# Patient Record
Sex: MALE | Race: WHITE | NOT HISPANIC OR LATINO | Employment: OTHER | URBAN - METROPOLITAN AREA
[De-identification: names, ages, dates, MRNs, and addresses within clinical notes are randomized per-mention and may not be internally consistent; named-entity substitution may affect disease eponyms.]

---

## 2017-01-27 DIAGNOSIS — E11.9 TYPE 2 DIABETES MELLITUS WITHOUT COMPLICATION: ICD-10-CM

## 2017-02-13 ENCOUNTER — HISTORICAL (OUTPATIENT)
Dept: ADMINISTRATIVE | Facility: HOSPITAL | Age: 67
End: 2017-02-13

## 2017-02-13 LAB
ALBUMIN SERPL-MCNC: 4.2 G/DL (ref 3.1–4.7)
ALP SERPL-CCNC: 52 IU/L (ref 40–104)
ALT (SGPT): 20 IU/L (ref 3–33)
AST SERPL-CCNC: 14 IU/L (ref 10–40)
BASOPHILS NFR BLD: 0 K/UL (ref 0–0.2)
BASOPHILS NFR BLD: 0.3 %
BILIRUB SERPL-MCNC: 0.9 MG/DL (ref 0.3–1)
BUN SERPL-MCNC: 11 MG/DL (ref 8–20)
CALCIUM SERPL-MCNC: 9.4 MG/DL (ref 7.7–10.4)
CHLORIDE: 100 MMOL/L (ref 98–110)
CO2 SERPL-SCNC: 30.4 MMOL/L (ref 22.8–31.6)
CREATININE: 0.94 MG/DL (ref 0.6–1.4)
CRP SERPL-MCNC: 0.1 MG/DL (ref 0–1.4)
EOSINOPHIL NFR BLD: 0.1 K/UL (ref 0–0.7)
EOSINOPHIL NFR BLD: 1 %
ERYTHROCYTE [DISTWIDTH] IN BLOOD BY AUTOMATED COUNT: 12.7 % (ref 11.7–14.9)
GLUCOSE: 277 MG/DL (ref 70–99)
GRAN #: 7.9 K/UL (ref 1.4–6.5)
GRAN%: 74.7 %
HCT VFR BLD AUTO: 41.8 % (ref 39–55)
HGB BLD-MCNC: 13.8 G/DL (ref 14–16)
IMMATURE GRANS (ABS): 0.1 K/UL (ref 0–1)
IMMATURE GRANULOCYTES: 0.8 %
LYMPH #: 1.8 K/UL (ref 1.2–3.4)
LYMPH%: 16.6 %
MCH RBC QN AUTO: 29.1 PG (ref 25–35)
MCHC RBC AUTO-ENTMCNC: 33 G/DL (ref 31–36)
MCV RBC AUTO: 88.2 FL (ref 80–100)
MONO #: 0.7 K/UL (ref 0.1–0.6)
MONO%: 6.6 %
NUCLEATED RBCS: 0 %
PLATELET # BLD AUTO: 247 K/UL (ref 140–440)
PMV BLD AUTO: 9.9 FL (ref 8.8–12.7)
POTASSIUM SERPL-SCNC: 4.1 MMOL/L (ref 3.5–5)
PROT SERPL-MCNC: 7.5 G/DL (ref 6–8.2)
RBC # BLD AUTO: 4.74 M/UL (ref 4.3–5.9)
SODIUM: 140 MMOL/L (ref 134–144)
WBC # BLD AUTO: 10.6 K/UL (ref 5–10)

## 2017-03-15 ENCOUNTER — HISTORICAL (OUTPATIENT)
Dept: ADMINISTRATIVE | Facility: HOSPITAL | Age: 67
End: 2017-03-15

## 2017-05-23 RX ORDER — QUININE SULFATE 324 MG/1
CAPSULE ORAL
Qty: 30 CAPSULE | Refills: 11 | Status: SHIPPED | OUTPATIENT
Start: 2017-05-23 | End: 2018-12-18

## 2017-05-30 ENCOUNTER — OFFICE VISIT (OUTPATIENT)
Dept: RHEUMATOLOGY | Facility: CLINIC | Age: 67
End: 2017-05-30
Payer: MEDICARE

## 2017-05-30 VITALS
DIASTOLIC BLOOD PRESSURE: 80 MMHG | WEIGHT: 178.31 LBS | SYSTOLIC BLOOD PRESSURE: 136 MMHG | HEIGHT: 67 IN | BODY MASS INDEX: 27.99 KG/M2

## 2017-05-30 DIAGNOSIS — M06.9 RHEUMATOID ARTHRITIS, INVOLVING UNSPECIFIED SITE, UNSPECIFIED RHEUMATOID FACTOR PRESENCE: ICD-10-CM

## 2017-05-30 DIAGNOSIS — M25.40 JOINT SWELLING: ICD-10-CM

## 2017-05-30 DIAGNOSIS — M65.9 SYNOVITIS: ICD-10-CM

## 2017-05-30 DIAGNOSIS — Z79.631 METHOTREXATE, LONG TERM, CURRENT USE: Primary | ICD-10-CM

## 2017-05-30 PROCEDURE — 99213 OFFICE O/P EST LOW 20 MIN: CPT | Mod: ,,, | Performed by: INTERNAL MEDICINE

## 2017-05-30 PROCEDURE — 1159F MED LIST DOCD IN RCRD: CPT | Mod: ,,, | Performed by: INTERNAL MEDICINE

## 2017-05-30 RX ORDER — PREDNISONE 5 MG/1
5 TABLET ORAL DAILY
COMMUNITY
End: 2017-09-26

## 2017-05-30 NOTE — PROGRESS NOTES
The patient was seen during a power outage. Unfortunately, no access to the computer or to dictation was available. The patient has been treated for polymyalgia rheumatic.As I was tapering his steroids at appropriate intervals, swelling and pain of the MCP joints of both hands became apparent I saw the patient for this problem one month ago and he was given a steroid challenge. That was quite helpful and he reports that during the first week of that challenge, he had a 100% benefit.He is now off of the additional steroids but continues on 5 mg daily. The physical exam continues to demonstrate synovial proliferation of the right second and third MCP joint only. Unfortunately, the blood tests obtained at the last office visit were also not available to me. Clinically however, this presentation is consistent with rheumatoid arthritis that was masked by steroids used for polymyalgia rheumatica which has now essentially been fully treated. I discussed the options with the patient and informed him that I would like to approach the rheumatoid arthritis with the mildest and safest treatment available, hydroxychloroquine. The medication was discussed at length with him and he is in full agreement. I have therefore given him a handwritten prescription for hydroxychloroquine 200 mg to be taken twice daily. He will continue to take prednisone 5 mg once daily. No blood testing is required today but I will see him back in 2 months and then repeat all appropriate testing. The patient was referred to ophthalmology for a baseline retinal exam and visual fields.

## 2017-07-31 ENCOUNTER — OFFICE VISIT (OUTPATIENT)
Dept: RHEUMATOLOGY | Facility: CLINIC | Age: 67
End: 2017-07-31
Payer: MEDICARE

## 2017-07-31 VITALS
HEIGHT: 67 IN | DIASTOLIC BLOOD PRESSURE: 81 MMHG | WEIGHT: 179 LBS | SYSTOLIC BLOOD PRESSURE: 120 MMHG | BODY MASS INDEX: 28.09 KG/M2

## 2017-07-31 DIAGNOSIS — E11.9 TYPE 2 DIABETES MELLITUS WITHOUT RETINOPATHY: ICD-10-CM

## 2017-07-31 DIAGNOSIS — M19.90 CHRONIC INFLAMMATORY ARTHRITIS: Primary | ICD-10-CM

## 2017-07-31 DIAGNOSIS — Z79.899 ENCOUNTER FOR LONG-TERM (CURRENT) USE OF OTHER MEDICATIONS: ICD-10-CM

## 2017-07-31 DIAGNOSIS — M35.3 PMR (POLYMYALGIA RHEUMATICA): Primary | ICD-10-CM

## 2017-07-31 LAB
ALBUMIN SERPL-MCNC: 4.3 G/DL (ref 3.1–4.7)
ALP SERPL-CCNC: 55 IU/L (ref 40–104)
ALT (SGPT): 22 IU/L (ref 3–33)
AST SERPL-CCNC: 16 IU/L (ref 10–40)
BASOPHILS NFR BLD: 0 K/UL (ref 0–0.2)
BASOPHILS NFR BLD: 0.4 %
BILIRUB SERPL-MCNC: 0.8 MG/DL (ref 0.3–1)
BUN SERPL-MCNC: 15 MG/DL (ref 8–20)
CALCIUM SERPL-MCNC: 9.8 MG/DL (ref 7.7–10.4)
CHLORIDE: 101 MMOL/L (ref 98–110)
CO2 SERPL-SCNC: 28.8 MMOL/L (ref 22.8–31.6)
CREATININE: 0.89 MG/DL (ref 0.6–1.4)
CRP SERPL-MCNC: 0.19 MG/DL (ref 0–1.4)
EOSINOPHIL NFR BLD: 0.1 K/UL (ref 0–0.7)
EOSINOPHIL NFR BLD: 1.8 %
ERYTHROCYTE [DISTWIDTH] IN BLOOD BY AUTOMATED COUNT: 12.5 % (ref 11.7–14.9)
GLUCOSE: 155 MG/DL (ref 70–99)
GRAN #: 4.7 K/UL (ref 1.4–6.5)
GRAN%: 60.1 %
HCT VFR BLD AUTO: 44.1 % (ref 39–55)
HGB BLD-MCNC: 14.5 G/DL (ref 14–16)
IMMATURE GRANS (ABS): 0 K/UL (ref 0–1)
IMMATURE GRANULOCYTES: 0.4 %
LYMPH #: 2.3 K/UL (ref 1.2–3.4)
LYMPH%: 29.4 %
MCH RBC QN AUTO: 28.2 PG (ref 25–35)
MCHC RBC AUTO-ENTMCNC: 32.9 G/DL (ref 31–36)
MCV RBC AUTO: 85.8 FL (ref 80–100)
MONO #: 0.6 K/UL (ref 0.1–0.6)
MONO%: 7.9 %
NUCLEATED RBCS: 0 %
PLATELET # BLD AUTO: 280 K/UL (ref 140–440)
PMV BLD AUTO: 9.7 FL (ref 8.8–12.7)
POTASSIUM SERPL-SCNC: 4.3 MMOL/L (ref 3.5–5)
PROT SERPL-MCNC: 7.6 G/DL (ref 6–8.2)
RBC # BLD AUTO: 5.14 M/UL (ref 4.3–5.9)
SODIUM: 139 MMOL/L (ref 134–144)
WBC # BLD AUTO: 7.8 K/UL (ref 5–10)

## 2017-07-31 PROCEDURE — 99214 OFFICE O/P EST MOD 30 MIN: CPT | Mod: ,,, | Performed by: INTERNAL MEDICINE

## 2017-07-31 PROCEDURE — 1159F MED LIST DOCD IN RCRD: CPT | Mod: ,,, | Performed by: INTERNAL MEDICINE

## 2017-07-31 RX ORDER — PREDNISONE 5 MG/1
5 TABLET ORAL DAILY
Qty: 30 TABLET | Refills: 5 | OUTPATIENT
Start: 2017-07-31

## 2017-07-31 RX ORDER — GLIPIZIDE AND METFORMIN HCL 5; 500 MG/1; MG/1
2 TABLET, FILM COATED ORAL DAILY
Qty: 180 TABLET | Refills: 3 | OUTPATIENT
Start: 2017-07-31

## 2017-07-31 NOTE — PROGRESS NOTES
[facility] RHEUMATOLOGY           Follow-up visit      Subjective:       Patient ID:   NAME: Kyrie Norton Jr. : 1950     67 y.o. male    Referring Doc: No ref. provider found  Other Physicians:    Chief Complaint:  polymyagia rheumatica (follow up, cramps still in hands and fingers )      HPI/Interval History:     The patient is doing very well. He has some problems with cramping in his hands mostly in the morning. He does not note any discrete joint swelling, redness or warmth. He takes prednisone 5 mg once daily in the morning and does not note that it has any benefit and also feels that he is just as well in the early evening as he was an hour after taking the prednisone. His original diagnosis with me was polymyalgia rheumatica but at one point, several months back, he did appear to have some inflammatory changes. At that time I considered that his diagnosis was perhaps inflammatory arthritis rather than simply polymyalgia rheumatica.          ROS:   GEN: no fever, night sweats or weight loss  SKIN:  no rashes , erythema, bruising, or swelling, no Raynauds, no photosensitivity  HEENT: no HAs, no changes in vision, no mouth ulcers, no sicca symptoms, no scalp tenderness, jaw claudication.  CV: no CP, SOB, PND, JUAN or orthopnea,no palpitations  PULM: no SOB, cough, hemoptysis, sputum or pleuritic pain  GI: no abdominal pain, nausea, vomiting, constipation, diarrhea, melanotic stools, BRBPR, or hematemesis.no dysphagia  : no hematuria, dysuria  NEURO:no paresthesias, headaches, visual disturbances, muscle weakness  MUSCULOSKELETAL:  Complaints of spasms in the hands and perhaps morning stiffness but no complaints of red, hot and/or swollen joints.  PSYCH: No insomnia, no significant depression, no anxiety    Medications:    Current Outpatient Prescriptions:     atorvastatin (LIPITOR) 40 MG tablet, Take 1 tablet (40 mg total) by mouth once daily., Disp: 90 tablet, Rfl: 4    blood sugar diagnostic  "(ACCU-CHEK CJ PLUS TEST STRP) Strp, 1 strip by Subdermal route Daily., Disp: 100 strip, Rfl: 4    ciclopirox (PENLAC) 8 % Soln, Apply topically once daily., Disp: 1 Bottle, Rfl: 10    CINNAMON OIL MISC, by Misc.(Non-Drug; Combo Route) route., Disp: , Rfl:     co-enzyme Q-10 50 mg capsule, Take 50 mg by mouth once daily., Disp: , Rfl:     fish oil-omega-3 fatty acids 300-1,000 mg capsule, Take 2 g by mouth once daily., Disp: , Rfl:     glipizide-metformin (METAGLIP) 5-500 mg per tablet, Take 2 tablets by mouth once daily., Disp: 180 tablet, Rfl: 3    ibuprofen (ADVIL,MOTRIN) 800 MG tablet, Take 1 tablet (800 mg total) by mouth every 6 (six) hours as needed for Pain., Disp: 20 tablet, Rfl: 0    lancets (ACCU-CHEK FASTCLIX) Misc, 1 Device by Misc.(Non-Drug; Combo Route) route Daily., Disp: 100 each, Rfl: 4    milk thistle 175 mg tablet, Take 175 mg by mouth once daily., Disp: , Rfl:     predniSONE (DELTASONE) 5 MG tablet, Take 5 mg by mouth once daily., Disp: , Rfl:     quinine sulfate 324 mg Cap, TAKE ONE CAPSULE BY MOUTH EVERY EVENING, Disp: 30 capsule, Rfl: 11    S-ADENOSYLMETHIONINE SUL TOSYL (NIDIA-E ORAL), Take by mouth., Disp: , Rfl:     sildenafil (VIAGRA) 100 MG tablet, Take 1 tablet (100 mg total) by mouth daily as needed for Erectile Dysfunction., Disp: 4 tablet, Rfl: 4    tadalafil (CIALIS) 5 MG tablet, Take 1 tablet (5 mg total) by mouth daily as needed for Erectile Dysfunction., Disp: 30 tablet, Rfl: 6  FAMILY HISTORY: negative for Connective Tissue Disease        Review of patient's allergies indicates:   Allergen Reactions    Parafon forte Nausea Only             Objective:     Vitals:  Blood pressure 120/81, height 5' 7" (1.702 m), weight 81.2 kg (179 lb).    Physical Examination:   GEN: wn/wd male in no apparent distress; AAOx3  SKIN: no rashes, no lesions, no sclerodactyly, no Raynaud's, no periungual erythema  HEAD: no alopecia, no scalp tenderness, no temporal artery tenderness or " induration.  EYES: no pallor, no icterus, PERRLA  ENT:  no thrush, no mucosal dryness or ulcerations, adequate oral hygiene & dentition.  NECK: supple x 6, no masses, no thyromegaly, no lymphadenopathy.  CV:   S1 and S2 regular, no murmurs, gallop or rubs  CHEST: Normal respiratory effort;  normal breath sounds/no adventitious sounds. No signs of consolidation.  ABD: non-tender and non-distended; soft; normal bowel sounds; no rebound/guarding or tenderness. No hepatosplenomegaly.  Musculoskeletal:  There is no evidence of inflammatory arthritis today or of any progressive deformity.  EXTREM: no clubbing, cyanosis or edema. normal pulses.  NEURO: grossly intact; motor/sensory WNL; AAOx3; no tremors  PSYCH: normal mood, affect and behavior            Labs:   Lab Results   Component Value Date    WBC 10.6 (H) 02/13/2017    HGB 13.8 (L) 02/13/2017    HCT 41.8 02/13/2017    MCV 88.2 02/13/2017     02/13/2017   CMP@  Sodium   Date Value Ref Range Status   02/13/2017 140 134 - 144 mmol/L      Potassium   Date Value Ref Range Status   02/13/2017 4.1 3.5 - 5.0 mmol/L      Chloride   Date Value Ref Range Status   02/13/2017 100 98 - 110 mmol/L      CO2   Date Value Ref Range Status   02/13/2017 30.4 22.8 - 31.6 mmol/L      Glucose   Date Value Ref Range Status   02/13/2017 277 (H) 70 - 99 mg/dL      BUN, Bld   Date Value Ref Range Status   02/13/2017 11 8 - 20 mg/dL      Creatinine   Date Value Ref Range Status   02/13/2017 0.94 0.60 - 1.40 mg/dL      Calcium   Date Value Ref Range Status   02/13/2017 9.4 7.7 - 10.4 mg/dL      Total Protein   Date Value Ref Range Status   02/13/2017 7.5 6.0 - 8.2 g/dL      Albumin   Date Value Ref Range Status   02/13/2017 4.2 3.1 - 4.7 g/dL      Total Bilirubin   Date Value Ref Range Status   02/13/2017 0.9 0.3 - 1.0 mg/dL      Alkaline Phosphatase   Date Value Ref Range Status   02/13/2017 52 40 - 104 IU/L      AST   Date Value Ref Range Status   02/13/2017 14 10 - 40 IU/L      ALT    Date Value Ref Range Status   12/14/2015 21 10 - 44 U/L Final     CRP   Date Value Ref Range Status   02/13/2017 0.10 0.00 - 1.40 mg/dL          Radiology/Diagnostic Studies:        Assessment/Discussion/Plan:   67 y.o. male with polymyalgia rheumatica-feeling well on extremely low-dose prednisone.  (2) At least one episode of what appeared clinically to be inflammatory arthritis, not currently noted.      PLAN:  I am going to have him decrease his prednisone for the month of August to 1 dose every other day.On September 1, he is to discontinue prednisone completely.  I have asked the patient to notify me immediately of any changes that he notes that are significant during this 2 month period.   Routine blood testing was ordered today.    RTC:  I will see him back in 2 months.      Electronically signed by Calderon Barrett MD

## 2017-08-01 LAB — CCP ANTIBODIES IGG/IGA: 7 UNITS (ref 0–19)

## 2017-08-01 RX ORDER — HYDROXYCHLOROQUINE SULFATE 200 MG/1
TABLET, FILM COATED ORAL
Qty: 60 TABLET | Refills: 0 | Status: SHIPPED | OUTPATIENT
Start: 2017-08-01 | End: 2017-08-07 | Stop reason: SDUPTHER

## 2017-08-07 DIAGNOSIS — M19.90 CHRONIC INFLAMMATORY ARTHRITIS: Primary | ICD-10-CM

## 2017-08-07 RX ORDER — HYDROXYCHLOROQUINE SULFATE 200 MG/1
TABLET, FILM COATED ORAL
Qty: 60 TABLET | Refills: 5 | Status: SHIPPED | OUTPATIENT
Start: 2017-08-07 | End: 2018-03-28 | Stop reason: SDUPTHER

## 2017-09-06 DIAGNOSIS — M19.90 CHRONIC INFLAMMATORY ARTHRITIS: Primary | ICD-10-CM

## 2017-09-06 RX ORDER — HYDROXYCHLOROQUINE SULFATE 200 MG/1
TABLET, FILM COATED ORAL
Qty: 60 TABLET | Refills: 11 | Status: SHIPPED | OUTPATIENT
Start: 2017-09-06 | End: 2018-09-08 | Stop reason: SDUPTHER

## 2017-09-26 ENCOUNTER — OFFICE VISIT (OUTPATIENT)
Dept: RHEUMATOLOGY | Facility: CLINIC | Age: 67
End: 2017-09-26
Payer: MEDICARE

## 2017-09-26 VITALS
HEIGHT: 67 IN | SYSTOLIC BLOOD PRESSURE: 130 MMHG | WEIGHT: 185.63 LBS | BODY MASS INDEX: 29.13 KG/M2 | DIASTOLIC BLOOD PRESSURE: 74 MMHG

## 2017-09-26 DIAGNOSIS — Z79.899 ENCOUNTER FOR LONG-TERM (CURRENT) USE OF OTHER MEDICATIONS: Primary | ICD-10-CM

## 2017-09-26 DIAGNOSIS — M19.90 CHRONIC INFLAMMATORY ARTHRITIS: ICD-10-CM

## 2017-09-26 LAB
ALBUMIN SERPL-MCNC: 4.2 G/DL (ref 3.1–4.7)
ALP SERPL-CCNC: 52 IU/L (ref 40–104)
ALT (SGPT): 21 IU/L (ref 3–33)
AST SERPL-CCNC: 16 IU/L (ref 10–40)
BASOPHILS NFR BLD: 0 K/UL (ref 0–0.2)
BASOPHILS NFR BLD: 0.3 %
BILIRUB SERPL-MCNC: 0.8 MG/DL (ref 0.3–1)
BUN SERPL-MCNC: 14 MG/DL (ref 8–20)
CALCIUM SERPL-MCNC: 9.8 MG/DL (ref 7.7–10.4)
CHLORIDE: 103 MMOL/L (ref 98–110)
CO2 SERPL-SCNC: 27 MMOL/L (ref 22.8–31.6)
CREATININE: 0.86 MG/DL (ref 0.6–1.4)
CRP SERPL-MCNC: 0.62 MG/DL (ref 0–1.4)
EOSINOPHIL NFR BLD: 0.2 K/UL (ref 0–0.7)
EOSINOPHIL NFR BLD: 3.1 %
ERYTHROCYTE [DISTWIDTH] IN BLOOD BY AUTOMATED COUNT: 12.8 % (ref 11.7–14.9)
GLUCOSE: 78 MG/DL (ref 70–99)
GRAN #: 4.6 K/UL (ref 1.4–6.5)
GRAN%: 58.9 %
HCT VFR BLD AUTO: 39.3 % (ref 39–55)
HGB BLD-MCNC: 13.1 G/DL (ref 14–16)
IMMATURE GRANS (ABS): 0 K/UL (ref 0–1)
IMMATURE GRANULOCYTES: 0.5 %
LYMPH #: 2.2 K/UL (ref 1.2–3.4)
LYMPH%: 28.2 %
MCH RBC QN AUTO: 28.4 PG (ref 25–35)
MCHC RBC AUTO-ENTMCNC: 33.3 G/DL (ref 31–36)
MCV RBC AUTO: 85.1 FL (ref 80–100)
MONO #: 0.7 K/UL (ref 0.1–0.6)
MONO%: 9 %
NUCLEATED RBCS: 0 %
PLATELET # BLD AUTO: 286 K/UL (ref 140–440)
PMV BLD AUTO: 9.9 FL (ref 8.8–12.7)
POTASSIUM SERPL-SCNC: 4.7 MMOL/L (ref 3.5–5)
PROT SERPL-MCNC: 7.4 G/DL (ref 6–8.2)
RBC # BLD AUTO: 4.62 M/UL (ref 4.3–5.9)
SODIUM: 140 MMOL/L (ref 134–144)
WBC # BLD AUTO: 7.8 K/UL (ref 5–10)

## 2017-09-26 PROCEDURE — 99214 OFFICE O/P EST MOD 30 MIN: CPT | Mod: ,,, | Performed by: INTERNAL MEDICINE

## 2017-09-26 PROCEDURE — 1159F MED LIST DOCD IN RCRD: CPT | Mod: ,,, | Performed by: INTERNAL MEDICINE

## 2017-09-26 PROCEDURE — 3008F BODY MASS INDEX DOCD: CPT | Mod: ,,, | Performed by: INTERNAL MEDICINE

## 2017-09-26 PROCEDURE — 3075F SYST BP GE 130 - 139MM HG: CPT | Mod: ,,, | Performed by: INTERNAL MEDICINE

## 2017-09-26 PROCEDURE — 3078F DIAST BP <80 MM HG: CPT | Mod: ,,, | Performed by: INTERNAL MEDICINE

## 2017-09-26 RX ORDER — PREDNISONE 2.5 MG/1
2.5 TABLET ORAL 2 TIMES DAILY
Qty: 60 TABLET | Refills: 5 | Status: SHIPPED | OUTPATIENT
Start: 2017-09-26 | End: 2018-05-21 | Stop reason: SDUPTHER

## 2017-09-26 NOTE — PROGRESS NOTES
[facility] RHEUMATOLOGY           Follow-up visit      Subjective:       Patient ID:   NAME: Kyrie Norton Jr. : 1950     67 y.o. male    Referring Doc: No ref. provider found  Other Physicians:    Chief Complaint:  Arthritis (joint pain migrating all over - aleve and tylenol no help)      HPI/Interval History:     The patient has noted increasing pain and joint swelling almost immediately after his prednisone was discontinued. He has had swelling and stiffness in the small joints of the hands and pain and swelling in the left shoulder. He has both morning stiffness and significant gelling. Showering tends to be helpful in establishing.           ROS:   GEN: no fever, night sweats or weight loss  SKIN:  no rashes , erythema, bruising, or swelling, no Raynauds, no photosensitivity  HEENT: no HAs, no changes in vision, no mouth ulcers, no sicca symptoms, no scalp tenderness, jaw claudication.  CV: no CP, SOB, PND, JUAN or orthopnea,no palpitations  PULM: no SOB, cough, hemoptysis, sputum or pleuritic pain  GI: no abdominal pain, nausea, vomiting, constipation, diarrhea, melanotic stools, BRBPR, or hematemesis.no dysphagia  : no hematuria, dysuria  NEURO:no paresthesias, headaches, visual disturbances, muscle weakness  MUSCULOSKELETAL:  Joint pain and swelling as noted above  PSYCH: No insomnia, no significant depression, no anxiety    Medications:    Current Outpatient Prescriptions:     atorvastatin (LIPITOR) 40 MG tablet, Take 1 tablet (40 mg total) by mouth once daily., Disp: 90 tablet, Rfl: 4    blood sugar diagnostic (ACCU-CHEK CJ PLUS TEST STRP) Strp, 1 strip by Subdermal route Daily., Disp: 100 strip, Rfl: 4    ciclopirox (PENLAC) 8 % Soln, Apply topically once daily., Disp: 1 Bottle, Rfl: 10    CINNAMON OIL MISC, by Misc.(Non-Drug; Combo Route) route., Disp: , Rfl:     co-enzyme Q-10 50 mg capsule, Take 50 mg by mouth once daily., Disp: , Rfl:     fish oil-omega-3 fatty acids 300-1,000 mg  "capsule, Take 2 g by mouth once daily., Disp: , Rfl:     glipizide-metformin (METAGLIP) 5-500 mg per tablet, Take 2 tablets by mouth once daily., Disp: 180 tablet, Rfl: 3    hydroxychloroquine (PLAQUENIL) 200 mg tablet, TAKE 1 TABLET BY MOUTH EVERY 12 HOURS, Disp: 60 tablet, Rfl: 11    ibuprofen (ADVIL,MOTRIN) 800 MG tablet, Take 1 tablet (800 mg total) by mouth every 6 (six) hours as needed for Pain., Disp: 20 tablet, Rfl: 0    lancets (ACCU-CHEK FASTCLIX) Misc, 1 Device by Misc.(Non-Drug; Combo Route) route Daily., Disp: 100 each, Rfl: 4    milk thistle 175 mg tablet, Take 175 mg by mouth once daily., Disp: , Rfl:     quinine sulfate 324 mg Cap, TAKE ONE CAPSULE BY MOUTH EVERY EVENING, Disp: 30 capsule, Rfl: 11    S-ADENOSYLMETHIONINE SUL TOSYL (NIDIA-E ORAL), Take by mouth., Disp: , Rfl:     predniSONE (DELTASONE) 2.5 MG tablet, Take 1 tablet (2.5 mg total) by mouth 2 (two) times daily., Disp: 60 tablet, Rfl: 5    sildenafil (VIAGRA) 100 MG tablet, Take 1 tablet (100 mg total) by mouth daily as needed for Erectile Dysfunction., Disp: 4 tablet, Rfl: 4    tadalafil (CIALIS) 5 MG tablet, Take 1 tablet (5 mg total) by mouth daily as needed for Erectile Dysfunction., Disp: 30 tablet, Rfl: 6  FAMILY HISTORY: negative for Connective Tissue Disease        Review of patient's allergies indicates:   Allergen Reactions    Parafon forte Nausea Only             Objective:     Vitals:  Blood pressure 130/74, height 5' 7" (1.702 m), weight 84.2 kg (185 lb 9.6 oz).    Physical Examination:   GEN: wn/wd male in no apparent distress; AAOx3  SKIN: no rashes, no lesions, no sclerodactyly, no Raynaud's, no periungual erythema  HEAD: no alopecia, no scalp tenderness, no temporal artery tenderness or induration.  EYES: no pallor, no icterus, PERRLA  ENT:  no thrush, no mucosal dryness or ulcerations, adequate oral hygiene & dentition.  NECK: supple x 6, no masses, no thyromegaly, no lymphadenopathy.  CV:   S1 and S2 regular, " no murmurs, gallop or rubs  CHEST: Normal respiratory effort;  normal breath sounds/no adventitious sounds. No signs of consolidation.  ABD: non-tender and non-distended; soft; normal bowel sounds; no rebound/guarding or tenderness. No hepatosplenomegaly.  Musculoskeletal:  Here is no evidence of muscle weakness or pain. There is low-grade synovitis present in scattered PIP joints bilaterally and in the right second and third MCP joints. There is tenderness over the left subacromial articulation but no evidence of warmth, redness or swelling. Range of motion is full.  EXTREM: no clubbing, cyanosis or edema. normal pulses.  NEURO: grossly intact; motor/sensory WNL; AAOx3; no tremors  PSYCH: normal mood, affect and behavior            Labs:   Lab Results   Component Value Date    WBC 7.8 07/31/2017    HGB 14.5 07/31/2017    HCT 44.1 07/31/2017    MCV 85.8 07/31/2017     07/31/2017   CMP@  Sodium   Date Value Ref Range Status   07/31/2017 139 134 - 144 mmol/L      Potassium   Date Value Ref Range Status   07/31/2017 4.3 3.5 - 5.0 mmol/L      Chloride   Date Value Ref Range Status   07/31/2017 101 98 - 110 mmol/L      CO2   Date Value Ref Range Status   07/31/2017 28.8 22.8 - 31.6 mmol/L      Glucose   Date Value Ref Range Status   07/31/2017 155 (H) 70 - 99 mg/dL      BUN, Bld   Date Value Ref Range Status   07/31/2017 15 8 - 20 mg/dL      Creatinine   Date Value Ref Range Status   07/31/2017 0.89 0.60 - 1.40 mg/dL      Calcium   Date Value Ref Range Status   07/31/2017 9.8 7.7 - 10.4 mg/dL      Total Protein   Date Value Ref Range Status   07/31/2017 7.6 6.0 - 8.2 g/dL      Albumin   Date Value Ref Range Status   07/31/2017 4.3 3.1 - 4.7 g/dL      Total Bilirubin   Date Value Ref Range Status   07/31/2017 0.8 0.3 - 1.0 mg/dL      Alkaline Phosphatase   Date Value Ref Range Status   07/31/2017 55 40 - 104 IU/L      AST   Date Value Ref Range Status   07/31/2017 16 10 - 40 IU/L      ALT   Date Value Ref Range  Status   12/14/2015 21 10 - 44 U/L Final     CRP   Date Value Ref Range Status   07/31/2017 0.19 0.00 - 1.40 mg/dL          Radiology/Diagnostic Studies:    No x-rays are available    Assessment/Discussion/Plan:   67 y.o. male with likely inflammatory arthritis unmasked by discontinuation of steroids.        PLAN:   I discussed the issue with the patient. This is something that I had anticipated when the steroids were discontinued. Because his clinical arthritis has been seronegative, it has been more difficult to pigeonhole.  It would appear then that it is not entirely controlled by hydroxychloroquine. I am going to get the appropriate blood testing including acute phase reactants, a rheumatoid factor and CCP. I am curious to see whether the acute phase reactants are elevated off steroids.  I will restart his prednisone today at 2.5 mg twice daily. He will continue hydroxychloroquine.  I will see him back in 2 months and we will then decide whether using low-dose steroids as above along with hydroxychloroquine is adequate or whether methotrexate needs to be added to the equation.      RTC:  He will return to clinic in 2 months.      Electronically signed by Calderon Barrett MD

## 2017-09-27 LAB
CCP ANTIBODIES IGG/IGA: 4 UNITS (ref 0–19)
RHEUMATOID FACT SERPL-ACNC: <10 IU/ML (ref 0–13.9)

## 2017-10-26 ENCOUNTER — OFFICE VISIT (OUTPATIENT)
Dept: RHEUMATOLOGY | Facility: CLINIC | Age: 67
End: 2017-10-26
Payer: MEDICARE

## 2017-10-26 VITALS — WEIGHT: 187.5 LBS | SYSTOLIC BLOOD PRESSURE: 118 MMHG | DIASTOLIC BLOOD PRESSURE: 74 MMHG | BODY MASS INDEX: 29.37 KG/M2

## 2017-10-26 DIAGNOSIS — Z79.631 METHOTREXATE, LONG TERM, CURRENT USE: ICD-10-CM

## 2017-10-26 DIAGNOSIS — M05.79 RHEUMATOID ARTHRITIS INVOLVING MULTIPLE SITES WITH POSITIVE RHEUMATOID FACTOR: Primary | ICD-10-CM

## 2017-10-26 PROCEDURE — 99214 OFFICE O/P EST MOD 30 MIN: CPT | Mod: ,,, | Performed by: INTERNAL MEDICINE

## 2017-10-26 RX ORDER — FOLIC ACID 1 MG/1
1 TABLET ORAL DAILY
Qty: 30 TABLET | Refills: 5 | Status: SHIPPED | OUTPATIENT
Start: 2017-10-26 | End: 2018-05-19 | Stop reason: SDUPTHER

## 2017-10-26 RX ORDER — METHOTREXATE 2.5 MG/1
12.5 TABLET ORAL
Qty: 25 TABLET | Refills: 5 | Status: SHIPPED | OUTPATIENT
Start: 2017-10-26 | End: 2018-07-28 | Stop reason: SDUPTHER

## 2017-10-26 NOTE — PATIENT INSTRUCTIONS
Methotrexate injection  What is this medicine?  METHOTREXATE (METH oh TREX ate) is a chemotherapy drug used to treat cancer including breast cancer, leukemia, and lymphoma. This medicine can also be used to treat psoriasis and certain kinds of arthritis.  How should I use this medicine?  This medicine is for infusion into a vein or for injection into muscle or into the spinal fluid (whichever applies). It is usually given by a health care professional in a hospital or clinic setting.  In rare cases, you might get this medicine at home. You will be taught how to give this medicine. Use exactly as directed. Take your medicine at regular intervals. Do not take your medicine more often than directed.  If this medicine is used for arthritis or psoriasis, it should be taken weekly, NOT daily.  It is important that you put your used needles and syringes in a special sharps container. Do not put them in a trash can. If you do not have a sharps container, call your pharmacist or healthcare provider to get one.  Talk to your pediatrician regarding the use of this medicine in children. While this drug may be prescribed for children as young as 2 years for selected conditions, precautions do apply.  What side effects may I notice from receiving this medicine?  Side effects that you should report to your doctor or health care professional as soon as possible:  · allergic reactions like skin rash, itching or hives, swelling of the face, lips, or tongue  · back pain  · breathing problems or shortness of breath  · confusion  · diarrhea  · dry, nonproductive cough  · low blood counts - this medicine may decrease the number of white blood cells, red blood cells and platelets. You may be at increased risk of infections and bleeding  · mouth sores  · redness, blistering, peeling or loosening of the skin, including inside the mouth  · seizures  · severe headaches  · signs of infection - fever or chills, cough, sore throat, pain or  difficulty passing urine  · signs and symptoms of bleeding such as bloody or black, tarry stools; red or dark-brown urine; spitting up blood or brown material that looks like coffee grounds; red spots on the skin; unusual bruising or bleeding from the eye, gums, or nose  · signs and symptoms of kidney injury like trouble passing urine or change in the amount of urine  · signs and symptoms of liver injury like dark yellow or brown urine; general ill feeling or flu-like  symptoms; light-colored stools; loss of appetite; nausea; right upper belly pain; unusually weak or tired; yellowing of the eyes or skin  · stiff neck  · vomiting  Side effects that usually do not require medical attention (report to your doctor or health care professional if they continue or are bothersome):  · dizziness  · hair loss  · headache  · stomach pain  · upset stomach  What may interact with this medicine?  This medicine may interact with the following medications:  · acitretin  · aspirin or aspirin-like medicines including salicylates  · azathioprine  · certain antibiotics like chloramphenicol, penicillin, tetracycline  · certain medicines for stomach problems like esomeprazole, omeprazole, pantoprazole  · cyclosporine  · gold  · hydroxychloroquine  · live virus vaccines  · mercaptopurine  · NSAIDs, medicines for pain and inflammation, like ibuprofen or naproxen  · other cytotoxic agents  · penicillamine  · phenylbutazone  · phenytoin  · probenacid  · retinoids such as isotretinoin and tretinoin  · steroid medicines like prednisone or cortisone  · sulfonamides like sulfasalazine and trimethoprim/sulfamethoxazole  · theophylline  What if I miss a dose?  It is important not to miss your dose. Call your doctor or health care professional if you are unable to keep an appointment. If you give yourself the medicine and you miss a dose, talk with your doctor or health care professional. Do not take double or extra doses.  Where should I keep my  medicine?  If you are using this medicine at home, you will be instructed on how to store this medicine. Throw away any unused medicine after the expiration date on the label.  What should I tell my health care provider before I take this medicine?  They need to know if you have any of these conditions:  · fluid in the stomach area or lungs  · if you often drink alcohol  · infection or immune system problems  · kidney disease  · liver disease  · low blood counts, like low white cell, platelet, or red cell counts  · lung disease  · radiation therapy  · stomach ulcers  · ulcerative colitis  · an unusual or allergic reaction to methotrexate, other medicines, foods, dyes, or preservatives  · pregnant or trying to get pregnant  · breast-feeding  What should I watch for while using this medicine?  Avoid alcoholic drinks.  In some cases, you may be given additional medicines to help with side effects. Follow all directions for their use.  This medicine can make you more sensitive to the sun. Keep out of the sun. If you cannot avoid being in the sun, wear protective clothing and use sunscreen. Do not use sun lamps or tanning beds/booths.  You may get drowsy or dizzy. Do not drive, use machinery, or do anything that needs mental alertness until you know how this medicine affects you. Do not stand or sit up quickly, especially if you are an older patient. This reduces the risk of dizzy or fainting spells.  You may need blood work done while you are taking this medicine.  Call your doctor or health care professional for advice if you get a fever, chills or sore throat, or other symptoms of a cold or flu. Do not treat yourself. This drug decreases your body's ability to fight infections. Try to avoid being around people who are sick.  This medicine may increase your risk to bruise or bleed. Call your doctor or health care professional if you notice any unusual bleeding.  Check with your doctor or health care professional if you  get an attack of severe diarrhea, nausea and vomiting, or if you sweat a lot. The loss of too much body fluid can make it dangerous for you to take this medicine.  Talk to your doctor about your risk of cancer. You may be more at risk for certain types of cancers if you take this medicine.  Both men and women must use effective birth control with this medicine. Do not become pregnant while taking this medicine or until at least 1 normal menstrual cycle has occurred after stopping it. Women should inform their doctor if they wish to become pregnant or think they might be pregnant. Men should not father a child while taking this medicine and for 3 months after stopping it. There is a potential for serious side effects to an unborn child. Talk to your health care professional or pharmacist for more information. Do not breast-feed an infant while taking this medicine.  NOTE:This sheet is a summary. It may not cover all possible information. If you have questions about this medicine, talk to your doctor, pharmacist, or health care provider. Copyright© 2017 [employer]

## 2017-10-26 NOTE — PROGRESS NOTES
[facility] RHEUMATOLOGY           Follow-up visit      Subjective:       Patient ID:   NAME: Kyrie Norton Jr. : 1950     67 y.o. male    Referring Doc: No ref. provider found  Other Physicians:    Chief Complaint:  Arthritis      HPI/Interval History:   The patient feels he is doing better than he was at the last visit though he continues to have some swelling, stiffness and pain in the joints of both hands. He does note morning stiffness lasting more than 30 minutes.           ROS:   GEN: no fever, night sweats or weight loss  SKIN:  no rashes , erythema, bruising, or swelling, no Raynauds, no photosensitivity  HEENT: no HAs, no changes in vision, no mouth ulcers, no sicca symptoms, no scalp tenderness, jaw claudication.  CV: no CP, SOB, PND, JUAN or orthopnea,no palpitations  PULM: no SOB, cough, hemoptysis, sputum or pleuritic pain  GI: no abdominal pain, nausea, vomiting, constipation, diarrhea, melanotic stools, BRBPR, or hematemesis.no dysphagia  : no hematuria, dysuria  NEURO:no paresthesias, headaches, visual disturbances, muscle weakness  MUSCULOSKELETAL:  Pain, swelling and stiffness in the hands  PSYCH: No insomnia, no significant depression, no anxiety    Medications:    Current Outpatient Prescriptions:     atorvastatin (LIPITOR) 40 MG tablet, Take 1 tablet (40 mg total) by mouth once daily., Disp: 90 tablet, Rfl: 4    blood sugar diagnostic (ACCU-CHEK CJ PLUS TEST STRP) Strp, 1 strip by Subdermal route Daily., Disp: 100 strip, Rfl: 4    ciclopirox (PENLAC) 8 % Soln, Apply topically once daily., Disp: 1 Bottle, Rfl: 10    CINNAMON OIL MISC, by Misc.(Non-Drug; Combo Route) route., Disp: , Rfl:     co-enzyme Q-10 50 mg capsule, Take 50 mg by mouth once daily., Disp: , Rfl:     fish oil-omega-3 fatty acids 300-1,000 mg capsule, Take 2 g by mouth once daily., Disp: , Rfl:     glipizide-metformin (METAGLIP) 5-500 mg per tablet, Take 2 tablets by mouth once daily., Disp: 180 tablet, Rfl:  3    hydroxychloroquine (PLAQUENIL) 200 mg tablet, TAKE 1 TABLET BY MOUTH EVERY 12 HOURS, Disp: 60 tablet, Rfl: 11    ibuprofen (ADVIL,MOTRIN) 800 MG tablet, Take 1 tablet (800 mg total) by mouth every 6 (six) hours as needed for Pain., Disp: 20 tablet, Rfl: 0    lancets (ACCU-CHEK FASTCLIX) Misc, 1 Device by Misc.(Non-Drug; Combo Route) route Daily., Disp: 100 each, Rfl: 4    milk thistle 175 mg tablet, Take 175 mg by mouth once daily., Disp: , Rfl:     predniSONE (DELTASONE) 2.5 MG tablet, Take 1 tablet (2.5 mg total) by mouth 2 (two) times daily., Disp: 60 tablet, Rfl: 5    quinine sulfate 324 mg Cap, TAKE ONE CAPSULE BY MOUTH EVERY EVENING, Disp: 30 capsule, Rfl: 11    S-ADENOSYLMETHIONINE SUL TOSYL (NIDIA-E ORAL), Take by mouth., Disp: , Rfl:     folic acid (FOLVITE) 1 MG tablet, Take 1 tablet (1 mg total) by mouth once daily., Disp: 30 tablet, Rfl: 5    methotrexate 2.5 MG Tab, Take 5 tablets (12.5 mg total) by mouth every 7 days., Disp: 25 tablet, Rfl: 5    sildenafil (VIAGRA) 100 MG tablet, Take 1 tablet (100 mg total) by mouth daily as needed for Erectile Dysfunction., Disp: 4 tablet, Rfl: 4    tadalafil (CIALIS) 5 MG tablet, Take 1 tablet (5 mg total) by mouth daily as needed for Erectile Dysfunction., Disp: 30 tablet, Rfl: 6  FAMILY HISTORY: negative for Connective Tissue Disease        Review of patient's allergies indicates:   Allergen Reactions    Parafon forte Nausea Only             Objective:     Vitals:  Blood pressure 118/74, weight 85 kg (187 lb 8 oz).    Physical Examination:   GEN: wn/wd male in no apparent distress; AAOx3  SKIN: no rashes, no lesions, no sclerodactyly, no Raynaud's, no periungual erythema  HEAD: no alopecia, no scalp tenderness, no temporal artery tenderness or induration.  EYES: no pallor, no icterus, PERRLA  ENT:  no thrush, no mucosal dryness or ulcerations, adequate oral hygiene & dentition.  NECK: supple x 6, no masses, no thyromegaly, no lymphadenopathy.  CV:    S1 and S2 regular, no murmurs, gallop or rubs  CHEST: Normal respiratory effort;  normal breath sounds/no adventitious sounds. No signs of consolidation.  ABD: non-tender and non-distended; soft; normal bowel sounds; no rebound/guarding or tenderness. No hepatosplenomegaly.  Musculoskeletal:  Low-grade synovial proliferation with active synovitis is noted in the right wrist and in the right second and third MCP joints. Scattered PIP joints bilaterally are tender but do not demonstrate synovial proliferation  EXTREM: no clubbing, cyanosis or edema. normal pulses.  NEURO: grossly intact; motor/sensory WNL; AAOx3; no tremors  PSYCH: normal mood, affect and behavior            Labs:   Lab Results   Component Value Date    WBC 7.8 09/26/2017    HGB 13.1 (L) 09/26/2017    HCT 39.3 09/26/2017    MCV 85.1 09/26/2017     09/26/2017   CMP@  Sodium   Date Value Ref Range Status   09/26/2017 140 134 - 144 mmol/L      Potassium   Date Value Ref Range Status   09/26/2017 4.7 3.5 - 5.0 mmol/L      Chloride   Date Value Ref Range Status   09/26/2017 103 98 - 110 mmol/L      CO2   Date Value Ref Range Status   09/26/2017 27.0 22.8 - 31.6 mmol/L      Glucose   Date Value Ref Range Status   09/26/2017 78 70 - 99 mg/dL      BUN, Bld   Date Value Ref Range Status   09/26/2017 14 8 - 20 mg/dL      Creatinine   Date Value Ref Range Status   09/26/2017 0.86 0.60 - 1.40 mg/dL      Calcium   Date Value Ref Range Status   09/26/2017 9.8 7.7 - 10.4 mg/dL      Total Protein   Date Value Ref Range Status   09/26/2017 7.4 6.0 - 8.2 g/dL      Albumin   Date Value Ref Range Status   09/26/2017 4.2 3.1 - 4.7 g/dL      Total Bilirubin   Date Value Ref Range Status   09/26/2017 0.8 0.3 - 1.0 mg/dL      Alkaline Phosphatase   Date Value Ref Range Status   09/26/2017 52 40 - 104 IU/L      AST   Date Value Ref Range Status   09/26/2017 16 10 - 40 IU/L      ALT   Date Value Ref Range Status   12/14/2015 21 10 - 44 U/L Final     CRP   Date Value Ref  Range Status   09/26/2017 0.62 0.00 - 1.40 mg/dL      Rheumatoid Factor   Date Value Ref Range Status   09/26/2017 <10.0 0.0 - 13.9 IU/mL      Comment:     Performed at: [facility], [facility] [address], [id number] Ragland MD, Phone:  [phone]         Radiology/Diagnostic Studies:    none    Assessment/Discussion/Plan:   67 y.o. male with seronegative rheumatoid arthritis-incomplete clinical response to hydroxychloroquine for greater than 4 months      PLAN:  I discussed with him the issues surrounding his incomplete response and the appropriateness of adding methotrexate to his regimen. The patient is agreeable. I will therefore begin methotrexate 12.5 mg weekly along with folate supplementation. A printout on his new medication was provided.  Blood tests will be done prior to his next visit    RTC:   I will see him in 2 months.      Electronically signed by Calderon Barrett MD

## 2017-12-07 ENCOUNTER — OFFICE VISIT (OUTPATIENT)
Dept: RHEUMATOLOGY | Facility: CLINIC | Age: 67
End: 2017-12-07
Payer: MEDICARE

## 2017-12-07 VITALS — DIASTOLIC BLOOD PRESSURE: 82 MMHG | HEIGHT: 67 IN | SYSTOLIC BLOOD PRESSURE: 140 MMHG

## 2017-12-07 DIAGNOSIS — Z79.899 ENCOUNTER FOR LONG-TERM (CURRENT) DRUG USE: ICD-10-CM

## 2017-12-07 DIAGNOSIS — M05.79 RHEUMATOID ARTHRITIS INVOLVING MULTIPLE SITES WITH POSITIVE RHEUMATOID FACTOR: Primary | ICD-10-CM

## 2017-12-07 LAB
ALBUMIN SERPL-MCNC: 4.5 G/DL (ref 3.1–4.7)
ALP SERPL-CCNC: 43 IU/L (ref 40–104)
ALT (SGPT): 28 IU/L (ref 3–33)
AST SERPL-CCNC: 23 IU/L (ref 10–40)
BASOPHILS NFR BLD: 0 K/UL (ref 0–0.2)
BASOPHILS NFR BLD: 0.3 %
BILIRUB SERPL-MCNC: 0.9 MG/DL (ref 0.3–1)
BUN SERPL-MCNC: 11 MG/DL (ref 8–20)
CALCIUM SERPL-MCNC: 9.3 MG/DL (ref 7.7–10.4)
CHLORIDE: 100 MMOL/L (ref 98–110)
CO2 SERPL-SCNC: 26.7 MMOL/L (ref 22.8–31.6)
CREATININE: 0.89 MG/DL (ref 0.6–1.4)
CRP SERPL-MCNC: 0.04 MG/DL (ref 0–1.4)
EOSINOPHIL NFR BLD: 0.2 K/UL (ref 0–0.7)
EOSINOPHIL NFR BLD: 2.1 %
ERYTHROCYTE [DISTWIDTH] IN BLOOD BY AUTOMATED COUNT: 13.4 % (ref 11.7–14.9)
GLUCOSE: 62 MG/DL (ref 70–99)
GRAN #: 5 K/UL (ref 1.4–6.5)
GRAN%: 55 %
HCT VFR BLD AUTO: 40.8 % (ref 39–55)
HGB BLD-MCNC: 13.4 G/DL (ref 14–16)
IMMATURE GRANS (ABS): 0 K/UL (ref 0–1)
IMMATURE GRANULOCYTES: 0.3 %
LYMPH #: 3.2 K/UL (ref 1.2–3.4)
LYMPH%: 35.3 %
MCH RBC QN AUTO: 28.1 PG (ref 25–35)
MCHC RBC AUTO-ENTMCNC: 32.8 G/DL (ref 31–36)
MCV RBC AUTO: 85.5 FL (ref 80–100)
MONO #: 0.6 K/UL (ref 0.1–0.6)
MONO%: 7 %
NUCLEATED RBCS: 0 %
PLATELET # BLD AUTO: 266 K/UL (ref 140–440)
PMV BLD AUTO: 10.1 FL (ref 8.8–12.7)
POTASSIUM SERPL-SCNC: 3.6 MMOL/L (ref 3.5–5)
PROT SERPL-MCNC: 7.5 G/DL (ref 6–8.2)
RBC # BLD AUTO: 4.77 M/UL (ref 4.3–5.9)
SODIUM: 136 MMOL/L (ref 134–144)
WBC # BLD AUTO: 9.1 K/UL (ref 5–10)

## 2017-12-07 PROCEDURE — 99212 OFFICE O/P EST SF 10 MIN: CPT | Mod: ,,, | Performed by: INTERNAL MEDICINE

## 2017-12-07 NOTE — PROGRESS NOTES
[facility] RHEUMATOLOGY           Follow-up visit      Subjective:       Patient ID:   NAME: Kyrie Norton Jr. : 1950     67 y.o. male    Referring Doc: No ref. provider found  Other Physicians:    Chief Complaint:  No chief complaint on file.      HPI/Interval History:    The patient is doing well. He has had no problems with red, hot, and/or swollen joints          ROS:   GEN:    no fever, night sweats or weight loss  SKIN:   no rashes , erythema, bruising, or swelling, no Raynauds, no photosensitivity  HEENT: no HAs, no changes in vision, no mouth ulcers, no sicca symptoms, no scalp tenderness, jaw claudication.  CV:      no CP, SOB, PND, JUAN or orthopnea,no palpitations  PULM: no SOB, cough, hemoptysis, sputum or pleuritic pain  GI:      no abdominal pain, nausea, vomiting, constipation, diarrhea, melanotic stools, BRBPR, or hematemesis, no dysphagia, no GERD  :     no hematuria, dysuria  NEURO: no paresthesias, headaches, visual disturbances  MUSCULOSKELETAL:  No muscle or joint pain  PSYCH:   No insomnia, no significant depression, no anxiety    Medications:    Current Outpatient Prescriptions:     atorvastatin (LIPITOR) 40 MG tablet, Take 1 tablet (40 mg total) by mouth once daily., Disp: 90 tablet, Rfl: 4    blood sugar diagnostic (ACCU-CHEK CJ PLUS TEST STRP) Strp, 1 strip by Subdermal route Daily., Disp: 100 strip, Rfl: 4    ciclopirox (PENLAC) 8 % Soln, Apply topically once daily., Disp: 1 Bottle, Rfl: 10    CINNAMON OIL MISC, by Misc.(Non-Drug; Combo Route) route., Disp: , Rfl:     co-enzyme Q-10 50 mg capsule, Take 50 mg by mouth once daily., Disp: , Rfl:     fish oil-omega-3 fatty acids 300-1,000 mg capsule, Take 2 g by mouth once daily., Disp: , Rfl:     folic acid (FOLVITE) 1 MG tablet, Take 1 tablet (1 mg total) by mouth once daily., Disp: 30 tablet, Rfl: 5    glipizide-metformin (METAGLIP) 5-500 mg per tablet, Take 2 tablets by mouth once daily., Disp: 180 tablet, Rfl: 3     "hydroxychloroquine (PLAQUENIL) 200 mg tablet, TAKE 1 TABLET BY MOUTH EVERY 12 HOURS, Disp: 60 tablet, Rfl: 11    ibuprofen (ADVIL,MOTRIN) 800 MG tablet, Take 1 tablet (800 mg total) by mouth every 6 (six) hours as needed for Pain., Disp: 20 tablet, Rfl: 0    lancets (ACCU-CHEK FASTCLIX) Misc, 1 Device by Misc.(Non-Drug; Combo Route) route Daily., Disp: 100 each, Rfl: 4    methotrexate 2.5 MG Tab, Take 5 tablets (12.5 mg total) by mouth every 7 days., Disp: 25 tablet, Rfl: 5    milk thistle 175 mg tablet, Take 175 mg by mouth once daily., Disp: , Rfl:     predniSONE (DELTASONE) 2.5 MG tablet, Take 1 tablet (2.5 mg total) by mouth 2 (two) times daily., Disp: 60 tablet, Rfl: 5    quinine sulfate 324 mg Cap, TAKE ONE CAPSULE BY MOUTH EVERY EVENING, Disp: 30 capsule, Rfl: 11    S-ADENOSYLMETHIONINE SUL TOSYL (NIDIA-E ORAL), Take by mouth., Disp: , Rfl:     sildenafil (VIAGRA) 100 MG tablet, Take 1 tablet (100 mg total) by mouth daily as needed for Erectile Dysfunction., Disp: 4 tablet, Rfl: 4    tadalafil (CIALIS) 5 MG tablet, Take 1 tablet (5 mg total) by mouth daily as needed for Erectile Dysfunction., Disp: 30 tablet, Rfl: 6      FAMILY HISTORY: negative for Connective Tissue Disease        Review of patient's allergies indicates:   Allergen Reactions    Parafon forte Nausea Only             Objective:     Vitals:  Blood pressure (!) 140/82, height 5' 7" (1.702 m).    Physical Examination:   GEN: wn/wd male in no apparent distress  SKIN: no rashes, no lesions, no sclerodactyly, no Raynaud's, no periungual erythema  HEAD: no alopecia, no scalp tenderness, no temporal artery tenderness or induration.  EYES: no pallor, no icterus, PERRLA  ENT:  no thrush, no mucosal dryness or ulcerations, adequate oral hygiene & dentition.  NECK: supple x 6, no masses, no thyromegaly, no lymphadenopathy.  CV:   S1 and S2 regular, no murmurs, gallop or rubs  CHEST: Normal respiratory effort;  normal breath sounds/no adventitious " sounds. No signs of consolidation.  ABD: non-tender and non-distended; soft; normal bowel sounds; no rebound/guarding or tenderness. No hepatosplenomegaly.  Musculoskeletal:  No evidence of active inflammatory disease or a progressive deformity  EXTREM: no clubbing, cyanosis or edema. normal pulses.  NEURO: grossly intact; motor/sensory WNL; AAOx3; no tremors  PSYCH:  normal mood, affect and behavior            Labs:   Lab Results   Component Value Date    WBC 7.8 09/26/2017    HGB 13.1 (L) 09/26/2017    HCT 39.3 09/26/2017    MCV 85.1 09/26/2017     09/26/2017   CMP@  Sodium   Date Value Ref Range Status   09/26/2017 140 134 - 144 mmol/L      Potassium   Date Value Ref Range Status   09/26/2017 4.7 3.5 - 5.0 mmol/L      Chloride   Date Value Ref Range Status   09/26/2017 103 98 - 110 mmol/L      CO2   Date Value Ref Range Status   09/26/2017 27.0 22.8 - 31.6 mmol/L      Glucose   Date Value Ref Range Status   09/26/2017 78 70 - 99 mg/dL      BUN, Bld   Date Value Ref Range Status   09/26/2017 14 8 - 20 mg/dL      Creatinine   Date Value Ref Range Status   09/26/2017 0.86 0.60 - 1.40 mg/dL      Calcium   Date Value Ref Range Status   09/26/2017 9.8 7.7 - 10.4 mg/dL      Total Protein   Date Value Ref Range Status   09/26/2017 7.4 6.0 - 8.2 g/dL      Albumin   Date Value Ref Range Status   09/26/2017 4.2 3.1 - 4.7 g/dL      Total Bilirubin   Date Value Ref Range Status   09/26/2017 0.8 0.3 - 1.0 mg/dL      Alkaline Phosphatase   Date Value Ref Range Status   09/26/2017 52 40 - 104 IU/L      AST   Date Value Ref Range Status   09/26/2017 16 10 - 40 IU/L      ALT   Date Value Ref Range Status   12/14/2015 21 10 - 44 U/L Final     CRP   Date Value Ref Range Status   09/26/2017 0.62 0.00 - 1.40 mg/dL      Rheumatoid Factor   Date Value Ref Range Status   09/26/2017 <10.0 0.0 - 13.9 IU/mL      Comment:     Performed at: [facility], [facility] [id number] [place], [place], [id number] Ragland MD,  Phone:  [phone]         Radiology/Diagnostic Studies:    none    Assessment/Discussion/Plan:   67 y.o. male with chronic inflammatory arthritis-very good control on current medication consisting of methotrexate and hydroxychloroquine  along with prednisone 2.5 mg twice daily.    PLAN: I will continue his medications unchanged at present, though I plan to be off prednisone in the new year. Routine blood testing was obtained today.      RTC:  I will see him back in 3-4 months.      Electronically signed by Calderon Barrett MD

## 2018-03-28 DIAGNOSIS — M19.90 CHRONIC INFLAMMATORY ARTHRITIS: ICD-10-CM

## 2018-03-28 RX ORDER — HYDROXYCHLOROQUINE SULFATE 200 MG/1
TABLET, FILM COATED ORAL
Qty: 60 TABLET | Refills: 5 | Status: SHIPPED | OUTPATIENT
Start: 2018-03-28 | End: 2018-04-12 | Stop reason: SDUPTHER

## 2018-04-12 ENCOUNTER — OFFICE VISIT (OUTPATIENT)
Dept: RHEUMATOLOGY | Facility: CLINIC | Age: 68
End: 2018-04-12
Payer: MEDICARE

## 2018-04-12 VITALS
WEIGHT: 181.88 LBS | BODY MASS INDEX: 28.49 KG/M2 | SYSTOLIC BLOOD PRESSURE: 128 MMHG | DIASTOLIC BLOOD PRESSURE: 88 MMHG

## 2018-04-12 DIAGNOSIS — Z79.899 ENCOUNTER FOR LONG-TERM (CURRENT) DRUG USE: ICD-10-CM

## 2018-04-12 DIAGNOSIS — M19.90 CHRONIC INFLAMMATORY ARTHRITIS: ICD-10-CM

## 2018-04-12 DIAGNOSIS — M05.79 RHEUMATOID ARTHRITIS INVOLVING MULTIPLE SITES WITH POSITIVE RHEUMATOID FACTOR: Primary | ICD-10-CM

## 2018-04-12 LAB
ALBUMIN SERPL-MCNC: 4.5 G/DL (ref 3.1–4.7)
ALP SERPL-CCNC: 41 IU/L (ref 40–104)
ALT (SGPT): 28 IU/L (ref 3–33)
AST SERPL-CCNC: 16 IU/L (ref 10–40)
BASOPHILS NFR BLD: 0 K/UL (ref 0–0.2)
BASOPHILS NFR BLD: 0.2 %
BILIRUB SERPL-MCNC: 0.7 MG/DL (ref 0.3–1)
BUN SERPL-MCNC: 16 MG/DL (ref 8–20)
CALCIUM SERPL-MCNC: 10 MG/DL (ref 7.7–10.4)
CHLORIDE: 103 MMOL/L (ref 98–110)
CO2 SERPL-SCNC: 30 MMOL/L (ref 22.8–31.6)
CREATININE: 0.84 MG/DL (ref 0.6–1.4)
CRP SERPL-MCNC: <0.02 MG/DL (ref 0–1.4)
EOSINOPHIL NFR BLD: 0.2 K/UL (ref 0–0.7)
EOSINOPHIL NFR BLD: 1.7 %
ERYTHROCYTE [DISTWIDTH] IN BLOOD BY AUTOMATED COUNT: 13.1 % (ref 11.7–14.9)
GLUCOSE: 66 MG/DL (ref 70–99)
GRAN #: 5.7 K/UL (ref 1.4–6.5)
GRAN%: 58.8 %
HCT VFR BLD AUTO: 43.2 % (ref 39–55)
HGB BLD-MCNC: 14.2 G/DL (ref 14–16)
IMMATURE GRANS (ABS): 0 K/UL (ref 0–1)
IMMATURE GRANULOCYTES: 0.4 %
LYMPH #: 2.9 K/UL (ref 1.2–3.4)
LYMPH%: 30.6 %
MCH RBC QN AUTO: 30.1 PG (ref 25–35)
MCHC RBC AUTO-ENTMCNC: 32.9 G/DL (ref 31–36)
MCV RBC AUTO: 91.7 FL (ref 80–100)
MONO #: 0.8 K/UL (ref 0.1–0.6)
MONO%: 8.3 %
NUCLEATED RBCS: 0 %
PLATELET # BLD AUTO: 284 K/UL (ref 140–440)
PMV BLD AUTO: 10.1 FL (ref 8.8–12.7)
POTASSIUM SERPL-SCNC: 4.2 MMOL/L (ref 3.5–5)
PROT SERPL-MCNC: 7.6 G/DL (ref 6–8.2)
RBC # BLD AUTO: 4.71 M/UL (ref 4.3–5.9)
SODIUM: 141 MMOL/L (ref 134–144)
WBC # BLD AUTO: 9.6 K/UL (ref 5–10)

## 2018-04-12 PROCEDURE — 3074F SYST BP LT 130 MM HG: CPT | Mod: ,,, | Performed by: INTERNAL MEDICINE

## 2018-04-12 PROCEDURE — 99213 OFFICE O/P EST LOW 20 MIN: CPT | Mod: ,,, | Performed by: INTERNAL MEDICINE

## 2018-04-12 PROCEDURE — 3079F DIAST BP 80-89 MM HG: CPT | Mod: ,,, | Performed by: INTERNAL MEDICINE

## 2018-04-12 RX ORDER — HYDROXYCHLOROQUINE SULFATE 200 MG/1
TABLET, FILM COATED ORAL
Qty: 60 TABLET | Refills: 5 | Status: SHIPPED | OUTPATIENT
Start: 2018-04-12 | End: 2018-10-10 | Stop reason: SDUPTHER

## 2018-04-12 NOTE — PROGRESS NOTES
[facility] RHEUMATOLOGY           Follow-up visit      Subjective:       Patient ID:   NAME: Kyrie Norton Jr. : 1950     68 y.o. male    Referring Doc: No ref. provider found  Other Physicians:    Chief Complaint:  Rheumatoid Arthritis      HPI/Interval History:   The patient is doing great. He has no complaints of red, hot, and/or swollen joints.          ROS:   GEN:    no fever, night sweats or weight loss  SKIN:   no rashes , erythema, bruising, or swelling, no Raynauds, no photosensitivity  HEENT: no HAs, no changes in vision, no mouth ulcers, no sicca symptoms, no scalp tenderness, jaw claudication.  CV:      no CP, SOB, PND, JUAN or orthopnea,no palpitations  PULM: no SOB, cough, hemoptysis, sputum or pleuritic pain  GI:      no abdominal pain, nausea, vomiting, constipation, diarrhea, melanotic stools, BRBPR, or hematemesis, no dysphagia, no GERD  :     no hematuria, dysuria  NEURO: no paresthesias, headaches, visual disturbances  MUSCULOSKELETAL:  No complaints of muscle or joint pain  PSYCH:   No insomnia, no significant depression, no anxiety    Medications:    Current Outpatient Prescriptions:     atorvastatin (LIPITOR) 40 MG tablet, Take 1 tablet (40 mg total) by mouth once daily., Disp: 90 tablet, Rfl: 4    blood sugar diagnostic (ACCU-CHEK CJ PLUS TEST STRP) Strp, 1 strip by Subdermal route Daily., Disp: 100 strip, Rfl: 4    ciclopirox (PENLAC) 8 % Soln, Apply topically once daily., Disp: 1 Bottle, Rfl: 10    CINNAMON OIL MISC, by Misc.(Non-Drug; Combo Route) route., Disp: , Rfl:     co-enzyme Q-10 50 mg capsule, Take 50 mg by mouth once daily., Disp: , Rfl:     fish oil-omega-3 fatty acids 300-1,000 mg capsule, Take 2 g by mouth once daily., Disp: , Rfl:     folic acid (FOLVITE) 1 MG tablet, Take 1 tablet (1 mg total) by mouth once daily., Disp: 30 tablet, Rfl: 5    glipizide-metformin (METAGLIP) 5-500 mg per tablet, Take 2 tablets by mouth once daily., Disp: 180 tablet, Rfl: 3     hydroxychloroquine (PLAQUENIL) 200 mg tablet, TAKE 1 TABLET BY MOUTH EVERY 12 HOURS, Disp: 60 tablet, Rfl: 11    hydroxychloroquine (PLAQUENIL) 200 mg tablet, TAKE 1 TABLET BY MOUTH TWICE DAILY( EVERY 12 HOURS), Disp: 60 tablet, Rfl: 5    ibuprofen (ADVIL,MOTRIN) 800 MG tablet, Take 1 tablet (800 mg total) by mouth every 6 (six) hours as needed for Pain., Disp: 20 tablet, Rfl: 0    lancets (ACCU-CHEK FASTCLIX) Misc, 1 Device by Misc.(Non-Drug; Combo Route) route Daily., Disp: 100 each, Rfl: 4    methotrexate 2.5 MG Tab, Take 5 tablets (12.5 mg total) by mouth every 7 days., Disp: 25 tablet, Rfl: 5    milk thistle 175 mg tablet, Take 175 mg by mouth once daily., Disp: , Rfl:     predniSONE (DELTASONE) 2.5 MG tablet, Take 1 tablet (2.5 mg total) by mouth 2 (two) times daily., Disp: 60 tablet, Rfl: 5    quinine sulfate 324 mg Cap, TAKE ONE CAPSULE BY MOUTH EVERY EVENING, Disp: 30 capsule, Rfl: 11    S-ADENOSYLMETHIONINE SUL TOSYL (NIDIA-E ORAL), Take by mouth., Disp: , Rfl:     sildenafil (VIAGRA) 100 MG tablet, Take 1 tablet (100 mg total) by mouth daily as needed for Erectile Dysfunction., Disp: 4 tablet, Rfl: 4    tadalafil (CIALIS) 5 MG tablet, Take 1 tablet (5 mg total) by mouth daily as needed for Erectile Dysfunction., Disp: 30 tablet, Rfl: 6      FAMILY HISTORY: negative for Connective Tissue Disease        Review of patient's allergies indicates:   Allergen Reactions    Parafon forte Nausea Only             Objective:     Vitals:  Blood pressure 128/88, weight 82.5 kg (181 lb 14.4 oz).    Physical Examination:   GEN: wn/wd male in no apparent distress  SKIN: no rashes, no lesions, no sclerodactyly, no Raynaud's, no periungual erythema  HEAD: no alopecia, no scalp tenderness, no temporal artery tenderness or induration.  EYES: no pallor, no icterus, PERRLA  ENT:  no thrush, no mucosal dryness or ulcerations, adequate oral hygiene & dentition.  NECK: supple x 6, no masses, no thyromegaly, no  lymphadenopathy.  CV:   S1 and S2 regular, no murmurs, gallop or rubs  CHEST: Normal respiratory effort;  normal breath sounds/no adventitious sounds. No signs of consolidation.  ABD: non-tender and non-distended; soft; normal bowel sounds; no rebound/guarding or tenderness. No hepatosplenomegaly.  Musculoskeletal:  No evidence of active inflammatory arthritis or of progressive deformity  EXTREM: no clubbing, cyanosis or edema. normal pulses.  NEURO: grossly intact; motor/sensory WNL; AAOx3; no tremors  PSYCH:  normal mood, affect and behavior            Labs:   Lab Results   Component Value Date    WBC 9.1 12/07/2017    HGB 13.4 (L) 12/07/2017    HCT 40.8 12/07/2017    MCV 85.5 12/07/2017     12/07/2017   CMP@  Sodium   Date Value Ref Range Status   12/07/2017 136 134 - 144 mmol/L      Potassium   Date Value Ref Range Status   12/07/2017 3.6 3.5 - 5.0 mmol/L      Chloride   Date Value Ref Range Status   12/07/2017 100 98 - 110 mmol/L      CO2   Date Value Ref Range Status   12/07/2017 26.7 22.8 - 31.6 mmol/L      Glucose   Date Value Ref Range Status   12/07/2017 62 (L) 70 - 99 mg/dL      BUN, Bld   Date Value Ref Range Status   12/07/2017 11 8 - 20 mg/dL      Creatinine   Date Value Ref Range Status   12/07/2017 0.89 0.60 - 1.40 mg/dL      Calcium   Date Value Ref Range Status   12/07/2017 9.3 7.7 - 10.4 mg/dL      Total Protein   Date Value Ref Range Status   12/07/2017 7.5 6.0 - 8.2 g/dL      Albumin   Date Value Ref Range Status   12/07/2017 4.5 3.1 - 4.7 g/dL      Total Bilirubin   Date Value Ref Range Status   12/07/2017 0.9 0.3 - 1.0 mg/dL      Alkaline Phosphatase   Date Value Ref Range Status   12/07/2017 43 40 - 104 IU/L      AST   Date Value Ref Range Status   12/07/2017 23 10 - 40 IU/L      ALT   Date Value Ref Range Status   12/14/2015 21 10 - 44 U/L Final     CRP   Date Value Ref Range Status   12/07/2017 0.04 0.00 - 1.40 mg/dL      Rheumatoid Factor   Date Value Ref Range Status   09/26/2017  <10.0 0.0 - 13.9 IU/mL      Comment:     Performed at: [facility], [facility] [id number] [facility], [place], [place], [id number] Ragland MD, Phone:  [phone]         Radiology/Diagnostic Studies:    none    Assessment/Discussion/Plan:   68 y.o. male with seronegative rheumatoid arthritis-good control on methotrexate 12.5 mg weekly, Plaquenil 200 mg twice daily and prednisone 2.5 mg twice daily      PLAN:  I will continue his medication without change. Routine blood testing was obtained.      RTC:  I will see him back in 3-4 months.      Electronically signed by Calderon Barrett MD

## 2018-05-19 DIAGNOSIS — Z79.631 METHOTREXATE, LONG TERM, CURRENT USE: ICD-10-CM

## 2018-05-19 RX ORDER — FOLIC ACID 1 MG/1
TABLET ORAL
Qty: 30 TABLET | Refills: 0 | Status: SHIPPED | OUTPATIENT
Start: 2018-05-19 | End: 2018-06-17 | Stop reason: SDUPTHER

## 2018-05-20 DIAGNOSIS — M05.79 RHEUMATOID ARTHRITIS INVOLVING MULTIPLE SITES WITH POSITIVE RHEUMATOID FACTOR: ICD-10-CM

## 2018-05-20 RX ORDER — METHOTREXATE 2.5 MG/1
TABLET ORAL
Qty: 25 TABLET | Refills: 0 | Status: SHIPPED | OUTPATIENT
Start: 2018-05-20 | End: 2018-05-22 | Stop reason: SDUPTHER

## 2018-05-20 RX ORDER — PREDNISONE 5 MG/1
TABLET ORAL
Qty: 90 TABLET | Refills: 0
Start: 2018-05-20

## 2018-05-21 DIAGNOSIS — M19.90 CHRONIC INFLAMMATORY ARTHRITIS: ICD-10-CM

## 2018-05-21 RX ORDER — PREDNISONE 2.5 MG/1
2.5 TABLET ORAL 2 TIMES DAILY
Qty: 60 TABLET | Refills: 5 | Status: SHIPPED | OUTPATIENT
Start: 2018-05-21 | End: 2018-12-22 | Stop reason: SDUPTHER

## 2018-05-21 RX ORDER — PREDNISONE 5 MG/1
TABLET ORAL
Qty: 90 TABLET | Refills: 0 | OUTPATIENT
Start: 2018-05-21

## 2018-05-22 DIAGNOSIS — M05.79 RHEUMATOID ARTHRITIS INVOLVING MULTIPLE SITES WITH POSITIVE RHEUMATOID FACTOR: ICD-10-CM

## 2018-05-22 RX ORDER — METHOTREXATE 2.5 MG/1
TABLET ORAL
Qty: 25 TABLET | Refills: 5 | Status: SHIPPED | OUTPATIENT
Start: 2018-05-22 | End: 2018-08-20 | Stop reason: SDUPTHER

## 2018-06-17 DIAGNOSIS — Z79.631 METHOTREXATE, LONG TERM, CURRENT USE: ICD-10-CM

## 2018-06-17 RX ORDER — FOLIC ACID 1 MG/1
TABLET ORAL
Qty: 30 TABLET | Refills: 0 | Status: SHIPPED | OUTPATIENT
Start: 2018-06-17 | End: 2018-07-21 | Stop reason: SDUPTHER

## 2018-07-21 DIAGNOSIS — Z79.631 METHOTREXATE, LONG TERM, CURRENT USE: ICD-10-CM

## 2018-07-21 RX ORDER — FOLIC ACID 1 MG/1
TABLET ORAL
Qty: 30 TABLET | Refills: 0 | Status: SHIPPED | OUTPATIENT
Start: 2018-07-21 | End: 2018-08-19 | Stop reason: SDUPTHER

## 2018-07-23 DIAGNOSIS — M05.79 RHEUMATOID ARTHRITIS INVOLVING MULTIPLE SITES WITH POSITIVE RHEUMATOID FACTOR: ICD-10-CM

## 2018-07-28 RX ORDER — METHOTREXATE 2.5 MG/1
TABLET ORAL
Qty: 25 TABLET | Refills: 0 | Status: SHIPPED | OUTPATIENT
Start: 2018-07-28 | End: 2018-08-09

## 2018-08-09 ENCOUNTER — OFFICE VISIT (OUTPATIENT)
Dept: RHEUMATOLOGY | Facility: CLINIC | Age: 68
End: 2018-08-09
Payer: MEDICARE

## 2018-08-09 VITALS — SYSTOLIC BLOOD PRESSURE: 128 MMHG | WEIGHT: 181 LBS | DIASTOLIC BLOOD PRESSURE: 78 MMHG | BODY MASS INDEX: 28.35 KG/M2

## 2018-08-09 DIAGNOSIS — M19.90 CHRONIC INFLAMMATORY ARTHRITIS: Primary | ICD-10-CM

## 2018-08-09 DIAGNOSIS — Z79.899 ENCOUNTER FOR LONG-TERM (CURRENT) DRUG USE: ICD-10-CM

## 2018-08-09 PROCEDURE — 99213 OFFICE O/P EST LOW 20 MIN: CPT | Mod: ,,, | Performed by: INTERNAL MEDICINE

## 2018-08-09 NOTE — PROGRESS NOTES
[facility] RHEUMATOLOGY           Follow-up visit    Notes dictated via Dragon to EPIC. Please forgive any unintentional errors.  Subjective:       Patient ID:   NAME: Kyrie Norton Jr. : 1950     68 y.o. male    Referring Doc: No ref. provider found  Other Physicians:    Chief Complaint:  Rheumatoid Arthritis (Takes Plaquenil 200mg BID, Methotrexate 2.5mg 5 tablets weekly, Prednisone 2.5mg BID, Quinine Sulfate 324mg QHS)      HPI/Interval History:   The patient is doing very well. He has no complaints of any red, hot, and/or swollen joints.          ROS:   GEN:    no fever, night sweats or weight loss  SKIN:   no rashes , erythema, bruising, or swelling, no Raynauds, no photosensitivity  HEENT: no changes in vision, no mouth ulcers, no sicca symptoms, no scalp tenderness, no jaw claudication.  CV:      no CP, PND, JUAN or orthopnea, no palpitations  PULM: no SOB, cough, hemoptysis, sputum or pleuritic pain  GI:       no abdominal pain, nausea, vomiting, constipation, diarrhea, melanotic stools, BRBPR, or hematemesis, no dysphagia, no GERD  :     no hematuria, dysuria  NEURO: no paresthesias, headaches, acute visual disturbances  MUSCULOSKELETAL:  No complaints of muscle or joint pain  PSYCH:   No insomnia, no significant anxiety or depression    Medications:    Current Outpatient Prescriptions:     atorvastatin (LIPITOR) 40 MG tablet, Take 1 tablet (40 mg total) by mouth once daily., Disp: 90 tablet, Rfl: 4    blood sugar diagnostic (ACCU-CHEK CJ PLUS TEST STRP) Strp, 1 strip by Subdermal route Daily., Disp: 100 strip, Rfl: 4    co-enzyme Q-10 50 mg capsule, Take 50 mg by mouth once daily., Disp: , Rfl:     fish oil-omega-3 fatty acids 300-1,000 mg capsule, Take 2 g by mouth once daily., Disp: , Rfl:     folic acid (FOLVITE) 1 MG tablet, TAKE 1 TABLET BY MOUTH DAILY, Disp: 30 tablet, Rfl: 0    glipizide-metformin (METAGLIP) 5-500 mg per tablet, Take 2 tablets by mouth once daily., Disp: 180  tablet, Rfl: 3    hydroxychloroquine (PLAQUENIL) 200 mg tablet, TAKE 1 TABLET BY MOUTH TWICE DAILY( EVERY 12 HOURS), Disp: 60 tablet, Rfl: 5    ibuprofen (ADVIL,MOTRIN) 800 MG tablet, Take 1 tablet (800 mg total) by mouth every 6 (six) hours as needed for Pain., Disp: 20 tablet, Rfl: 0    lancets (ACCU-CHEK FASTCLIX) Misc, 1 Device by Misc.(Non-Drug; Combo Route) route Daily., Disp: 100 each, Rfl: 4    methotrexate 2.5 MG Tab, TAKE 5 TABLETS BY MOUTH EVERY 7 DAYS, Disp: 25 tablet, Rfl: 5    milk thistle 175 mg tablet, Take 175 mg by mouth once daily., Disp: , Rfl:     predniSONE (DELTASONE) 2.5 MG tablet, Take 1 tablet (2.5 mg total) by mouth 2 (two) times daily., Disp: 60 tablet, Rfl: 5    quinine sulfate 324 mg Cap, TAKE ONE CAPSULE BY MOUTH EVERY EVENING, Disp: 30 capsule, Rfl: 11    S-ADENOSYLMETHIONINE SUL TOSYL (NIDIA-E ORAL), Take by mouth., Disp: , Rfl:     sildenafil (VIAGRA) 100 MG tablet, Take 1 tablet (100 mg total) by mouth daily as needed for Erectile Dysfunction., Disp: 4 tablet, Rfl: 4    tadalafil (CIALIS) 5 MG tablet, Take 1 tablet (5 mg total) by mouth daily as needed for Erectile Dysfunction., Disp: 30 tablet, Rfl: 6      FAMILY HISTORY: negative for Connective Tissue Disease        Review of patient's allergies indicates:   Allergen Reactions    Parafon forte Nausea Only             Objective:     Vitals:  Blood pressure 128/78, weight 82.1 kg (181 lb).    Physical Examination:   GEN: wn/wd male in no apparent distress  SKIN: no rashes, no lesions, no sclerodactyly, no Raynaud's, no periungual erythema  HEAD: no alopecia, no scalp tenderness, no temporal artery tenderness or induration.  EYES: no pallor, no icterus, PERRLA  ENT:  no thrush, no mucosal dryness or ulcerations, adequate oral hygiene & dentition.  NECK: supple x 6, no masses, no thyromegaly, no lymphadenopathy.  CV:   S1 and S2 regular, no murmurs, gallop or rubs  CHEST: Normal respiratory effort;  normal breath sounds/no  adventitious sounds. No signs of consolidation.  ABD: non-tender and non-distended; soft; normal bowel sounds; no rebound/guarding or tenderness. No hepatosplenomegaly.  Musculoskeletal:  No evidence of active inflammatory arthritis. No progressive deformity  EXTREM: no clubbing, cyanosis or edema. normal pulses.  NEURO: grossly intact; motor/sensory WNL; no tremors  PSYCH:  normal mood, affect and behavior            Labs:   Lab Results   Component Value Date    WBC 9.6 04/12/2018    HGB 14.2 04/12/2018    HCT 43.2 04/12/2018    MCV 91.7 04/12/2018     04/12/2018   CMP@  Sodium   Date Value Ref Range Status   04/12/2018 141 134 - 144 mmol/L      Potassium   Date Value Ref Range Status   04/12/2018 4.2 3.5 - 5.0 mmol/L      Chloride   Date Value Ref Range Status   04/12/2018 103 98 - 110 mmol/L      CO2   Date Value Ref Range Status   04/12/2018 30.0 22.8 - 31.6 mmol/L      Glucose   Date Value Ref Range Status   04/12/2018 66 (L) 70 - 99 mg/dL      BUN, Bld   Date Value Ref Range Status   04/12/2018 16 8 - 20 mg/dL      Creatinine   Date Value Ref Range Status   04/12/2018 0.84 0.60 - 1.40 mg/dL      Calcium   Date Value Ref Range Status   04/12/2018 10.0 7.7 - 10.4 mg/dL      Total Protein   Date Value Ref Range Status   04/12/2018 7.6 6.0 - 8.2 g/dL      Albumin   Date Value Ref Range Status   04/12/2018 4.5 3.1 - 4.7 g/dL      Total Bilirubin   Date Value Ref Range Status   04/12/2018 0.7 0.3 - 1.0 mg/dL      Alkaline Phosphatase   Date Value Ref Range Status   04/12/2018 41 40 - 104 IU/L      AST   Date Value Ref Range Status   04/12/2018 16 10 - 40 IU/L      ALT   Date Value Ref Range Status   12/14/2015 21 10 - 44 U/L Final     CRP   Date Value Ref Range Status   04/12/2018 <0.02 0.00 - 1.40 mg/dL      Rheumatoid Factor   Date Value Ref Range Status   09/26/2017 <10.0 0.0 - 13.9 IU/mL      Comment:     Performed at: [facility], [facility] [id number] [facility], [place], [place], [id number] Ragland,  MD, Phone:  [phone]         Radiology/Diagnostic Studies:        Assessment/Discussion/Plan:   68 y.o. male with chronic inflammatory arthritis, seronegative-good control on current regimen with methotrexate plus hydroxychloroquine plus low-dose prednisone at 2.5 mg twice daily      PLAN:  I will continue his medication unchanged. Blood testing was ordered.      RTC:  I will see him back in 4 months.      Electronically signed by Calderon Barrett MD

## 2018-08-19 DIAGNOSIS — Z79.631 METHOTREXATE, LONG TERM, CURRENT USE: ICD-10-CM

## 2018-08-19 RX ORDER — FOLIC ACID 1 MG/1
TABLET ORAL
Qty: 30 TABLET | Refills: 0 | Status: SHIPPED | OUTPATIENT
Start: 2018-08-19 | End: 2018-09-17 | Stop reason: SDUPTHER

## 2018-08-20 DIAGNOSIS — M05.79 RHEUMATOID ARTHRITIS INVOLVING MULTIPLE SITES WITH POSITIVE RHEUMATOID FACTOR: ICD-10-CM

## 2018-08-20 RX ORDER — METHOTREXATE 2.5 MG/1
TABLET ORAL
Qty: 60 TABLET | Refills: 3 | Status: SHIPPED | OUTPATIENT
Start: 2018-08-20 | End: 2019-06-30 | Stop reason: SDUPTHER

## 2018-09-08 DIAGNOSIS — M19.90 CHRONIC INFLAMMATORY ARTHRITIS: ICD-10-CM

## 2018-09-08 RX ORDER — HYDROXYCHLOROQUINE SULFATE 200 MG/1
TABLET, FILM COATED ORAL
Qty: 60 TABLET | Refills: 0 | Status: SHIPPED | OUTPATIENT
Start: 2018-09-08 | End: 2018-10-10 | Stop reason: SDUPTHER

## 2018-09-17 DIAGNOSIS — Z79.631 METHOTREXATE, LONG TERM, CURRENT USE: ICD-10-CM

## 2018-09-17 RX ORDER — FOLIC ACID 1 MG/1
TABLET ORAL
Qty: 90 TABLET | Refills: 3 | Status: SHIPPED | OUTPATIENT
Start: 2018-09-17 | End: 2019-10-02 | Stop reason: SDUPTHER

## 2018-10-10 DIAGNOSIS — M19.90 CHRONIC INFLAMMATORY ARTHRITIS: ICD-10-CM

## 2018-10-10 RX ORDER — HYDROXYCHLOROQUINE SULFATE 200 MG/1
TABLET, FILM COATED ORAL
Qty: 60 TABLET | Refills: 5 | Status: SHIPPED | OUTPATIENT
Start: 2018-10-10 | End: 2019-03-30 | Stop reason: SDUPTHER

## 2018-11-18 DIAGNOSIS — M19.90 CHRONIC INFLAMMATORY ARTHRITIS: ICD-10-CM

## 2018-11-19 RX ORDER — PREDNISONE 2.5 MG/1
TABLET ORAL
Qty: 60 TABLET | Refills: 5 | Status: SHIPPED | OUTPATIENT
Start: 2018-11-19 | End: 2019-04-24 | Stop reason: SDUPTHER

## 2018-12-18 ENCOUNTER — OFFICE VISIT (OUTPATIENT)
Dept: RHEUMATOLOGY | Facility: CLINIC | Age: 68
End: 2018-12-18
Payer: MEDICARE

## 2018-12-18 VITALS — BODY MASS INDEX: 27.6 KG/M2 | DIASTOLIC BLOOD PRESSURE: 70 MMHG | SYSTOLIC BLOOD PRESSURE: 110 MMHG | WEIGHT: 176.19 LBS

## 2018-12-18 DIAGNOSIS — Z79.899 ENCOUNTER FOR LONG-TERM (CURRENT) DRUG USE: ICD-10-CM

## 2018-12-18 DIAGNOSIS — R68.82 LOW LIBIDO: ICD-10-CM

## 2018-12-18 DIAGNOSIS — N52.9 ERECTILE DYSFUNCTION, UNSPECIFIED ERECTILE DYSFUNCTION TYPE: ICD-10-CM

## 2018-12-18 DIAGNOSIS — M19.90 CHRONIC INFLAMMATORY ARTHRITIS: Primary | ICD-10-CM

## 2018-12-18 LAB
ALBUMIN SERPL-MCNC: 4.1 G/DL (ref 3.1–4.7)
ALP SERPL-CCNC: 48 IU/L (ref 40–104)
ALT (SGPT): 34 IU/L (ref 3–33)
AST SERPL-CCNC: 24 IU/L (ref 10–40)
BASOPHILS NFR BLD: 0 K/UL (ref 0–0.2)
BASOPHILS NFR BLD: 0.5 %
BILIRUB SERPL-MCNC: 0.6 MG/DL (ref 0.3–1)
BUN SERPL-MCNC: 10 MG/DL (ref 8–20)
CALCIUM SERPL-MCNC: 9.4 MG/DL (ref 7.7–10.4)
CHLORIDE: 100 MMOL/L (ref 98–110)
CO2 SERPL-SCNC: 28.6 MMOL/L (ref 22.8–31.6)
CREATININE: 0.82 MG/DL (ref 0.6–1.4)
CRP SERPL-MCNC: <0.02 MG/DL (ref 0–1.4)
EOSINOPHIL NFR BLD: 0.2 K/UL (ref 0–0.7)
EOSINOPHIL NFR BLD: 4.2 %
ERYTHROCYTE [DISTWIDTH] IN BLOOD BY AUTOMATED COUNT: 13 % (ref 11.7–14.9)
GLUCOSE: 77 MG/DL (ref 70–99)
GRAN #: 3.6 K/UL (ref 1.4–6.5)
GRAN%: 63.1 %
HCT VFR BLD AUTO: 42 % (ref 39–55)
HGB BLD-MCNC: 13.6 G/DL (ref 14–16)
IMMATURE GRANS (ABS): 0 K/UL (ref 0–1)
IMMATURE GRANULOCYTES: 0.5 %
LYMPH #: 1.5 K/UL (ref 1.2–3.4)
LYMPH%: 25.5 %
MCH RBC QN AUTO: 29.6 PG (ref 25–35)
MCHC RBC AUTO-ENTMCNC: 32.4 G/DL (ref 31–36)
MCV RBC AUTO: 91.3 FL (ref 80–100)
MONO #: 0.4 K/UL (ref 0.1–0.6)
MONO%: 6.2 %
NUCLEATED RBCS: 0 %
PLATELET # BLD AUTO: 291 K/UL (ref 140–440)
PMV BLD AUTO: 9.9 FL (ref 8.8–12.7)
POTASSIUM SERPL-SCNC: 4.9 MMOL/L (ref 3.5–5)
PROT SERPL-MCNC: 7.2 G/DL (ref 6–8.2)
RBC # BLD AUTO: 4.6 M/UL (ref 4.3–5.9)
SODIUM: 139 MMOL/L (ref 134–144)
WBC # BLD AUTO: 5.8 K/UL (ref 5–10)

## 2018-12-18 PROCEDURE — 99214 OFFICE O/P EST MOD 30 MIN: CPT | Mod: ,,, | Performed by: INTERNAL MEDICINE

## 2018-12-18 PROCEDURE — 3078F DIAST BP <80 MM HG: CPT | Mod: ,,, | Performed by: INTERNAL MEDICINE

## 2018-12-18 PROCEDURE — 3074F SYST BP LT 130 MM HG: CPT | Mod: ,,, | Performed by: INTERNAL MEDICINE

## 2018-12-18 RX ORDER — ASPIRIN 81 MG/1
81 TABLET ORAL DAILY
COMMUNITY

## 2018-12-18 NOTE — PROGRESS NOTES
[facility] RHEUMATOLOGY           Follow-up visit    Notes dictated via Dragon to EPIC. Please forgive any unintentional errors.  Subjective:       Patient ID:   NAME: Kyrie Norton Jr. : 1950     68 y.o. male    Referring Doc: No ref. provider found  Other Physicians:    Chief Complaint:  Rheumatoid Arthritis      HPI/Interval History:  The patient is doing well. He has no complaints of red, hot, and/or swollen joints. He has no problem with morning stiffness. He has been sleeping well.          ROS:   GEN:    no fever, night sweats or weight loss  SKIN:   no rashes , erythema, bruising, or swelling, no Raynauds, no photosensitivity  HEENT: no changes in vision, no mouth ulcers, no sicca symptoms, no scalp tenderness, no jaw claudication.  CV:      no CP, PND, JUAN or orthopnea, no palpitations  PULM: no SOB, cough, hemoptysis, sputum or pleuritic pain  GI:       no abdominal pain, nausea, vomiting, constipation, diarrhea, melanotic stools, BRBPR, or hematemesis, no dysphagia, no GERD  :     no hematuria, dysuria. +ED, low libido  NEURO: no paresthesias, headaches, acute visual disturbances  MUSCULOSKELETAL:  No muscle or joint complaints  PSYCH:   No insomnia, no significant anxiety or depression    Medications:    Current Outpatient Medications:     aspirin (ECOTRIN) 81 MG EC tablet, Take 81 mg by mouth once daily., Disp: , Rfl:     atorvastatin (LIPITOR) 40 MG tablet, Take 1 tablet (40 mg total) by mouth once daily., Disp: 90 tablet, Rfl: 4    blood sugar diagnostic (ACCU-CHEK CJ PLUS TEST STRP) Strp, 1 strip by Subdermal route Daily., Disp: 100 strip, Rfl: 4    fish oil-omega-3 fatty acids 300-1,000 mg capsule, Take 2 g by mouth once daily., Disp: , Rfl:     folic acid (FOLVITE) 1 MG tablet, TAKE 1 TABLET BY MOUTH DAILY, Disp: 90 tablet, Rfl: 3    glipizide-metformin (METAGLIP) 5-500 mg per tablet, Take 2 tablets by mouth once daily., Disp: 180 tablet, Rfl: 3    hydroxychloroquine (PLAQUENIL)  200 mg tablet, TAKE 1 TABLET BY MOUTH EVERY 12 HOURS, Disp: 60 tablet, Rfl: 5    ibuprofen (ADVIL,MOTRIN) 800 MG tablet, Take 1 tablet (800 mg total) by mouth every 6 (six) hours as needed for Pain., Disp: 20 tablet, Rfl: 0    lancets (ACCU-CHEK FASTCLIX) Misc, 1 Device by Misc.(Non-Drug; Combo Route) route Daily., Disp: 100 each, Rfl: 4    methotrexate 2.5 MG Tab, TAKE 5 TABLETS BY MOUTH EVERY 7 DAYS, Disp: 60 tablet, Rfl: 3    milk thistle 175 mg tablet, Take 175 mg by mouth once daily., Disp: , Rfl:     predniSONE (DELTASONE) 2.5 MG tablet, TAKE 1 TABLET(2.5 MG) BY MOUTH TWICE DAILY, Disp: 60 tablet, Rfl: 5    sildenafil (VIAGRA) 100 MG tablet, Take 1 tablet (100 mg total) by mouth daily as needed for Erectile Dysfunction., Disp: 4 tablet, Rfl: 4    tadalafil (CIALIS) 5 MG tablet, Take 1 tablet (5 mg total) by mouth daily as needed for Erectile Dysfunction., Disp: 30 tablet, Rfl: 6      FAMILY HISTORY: negative for Connective Tissue Disease        Review of patient's allergies indicates:   Allergen Reactions    Parafon forte Nausea Only             Objective:     Vitals:  Blood pressure 110/70, weight 79.9 kg (176 lb 3.2 oz).    Physical Examination:   GEN: wn/wd male in no apparent distress  SKIN: no rashes, no lesions, no sclerodactyly, no Raynaud's, no periungual erythema  HEAD: no alopecia, no scalp tenderness, no temporal artery tenderness or induration.  EYES: no pallor, no icterus, PERRLA  ENT:  no thrush, no mucosal dryness or ulcerations, adequate oral hygiene & dentition.  NECK: supple x 6, no masses, no thyromegaly, no lymphadenopathy.  CV:   S1 and S2 regular, no murmurs, gallop or rubs  CHEST: Normal respiratory effort;  normal breath sounds/no adventitious sounds. No signs of consolidation.  ABD: non-tender and non-distended; soft; normal bowel sounds; no rebound/guarding or tenderness. No hepatosplenomegaly.  Musculoskeletal:  No evidence of active inflammatory arthritis or of any  progressive deformity  EXTREM: no clubbing, cyanosis or edema. normal pulses.  NEURO: grossly intact; motor/sensory WNL; no tremors  PSYCH:  normal mood, affect and behavior            Labs:   Lab Results   Component Value Date    WBC 9.6 04/12/2018    HGB 14.2 04/12/2018    HCT 43.2 04/12/2018    MCV 91.7 04/12/2018     04/12/2018   CMP@  Sodium   Date Value Ref Range Status   04/12/2018 141 134 - 144 mmol/L      Potassium   Date Value Ref Range Status   04/12/2018 4.2 3.5 - 5.0 mmol/L      Chloride   Date Value Ref Range Status   04/12/2018 103 98 - 110 mmol/L      CO2   Date Value Ref Range Status   04/12/2018 30.0 22.8 - 31.6 mmol/L      Glucose   Date Value Ref Range Status   04/12/2018 66 (L) 70 - 99 mg/dL      BUN, Bld   Date Value Ref Range Status   04/12/2018 16 8 - 20 mg/dL      Creatinine   Date Value Ref Range Status   04/12/2018 0.84 0.60 - 1.40 mg/dL      Calcium   Date Value Ref Range Status   04/12/2018 10.0 7.7 - 10.4 mg/dL      Total Protein   Date Value Ref Range Status   04/12/2018 7.6 6.0 - 8.2 g/dL      Albumin   Date Value Ref Range Status   04/12/2018 4.5 3.1 - 4.7 g/dL      Total Bilirubin   Date Value Ref Range Status   04/12/2018 0.7 0.3 - 1.0 mg/dL      Alkaline Phosphatase   Date Value Ref Range Status   04/12/2018 41 40 - 104 IU/L      AST   Date Value Ref Range Status   04/12/2018 16 10 - 40 IU/L      ALT   Date Value Ref Range Status   12/14/2015 21 10 - 44 U/L Final     CRP   Date Value Ref Range Status   04/12/2018 <0.02 0.00 - 1.40 mg/dL      Rheumatoid Factor   Date Value Ref Range Status   09/26/2017 <10.0 0.0 - 13.9 IU/mL      Comment:     Performed at: [facility], [facility] [address], [id number] Ragland MD, Phone:  [phone]         Radiology/Diagnostic Studies:    none    Assessment/Discussion/Plan:   68 y.o. male with chronic inflammatory arthritis-excellent control on methotrexate 12.5 mg weekly, hydroxychloroquine 400 mg daily and  prednisone 2.5 mg twice daily  2) erectile dysfunction and low libido- unknown etiology    PLAN:  I will continue his rheumatoid medications without change. I have discussed the  problems with him and we have agreed to began by getting a serum testosterone level.  I have included this in my routine rheumatologic labs. I have suggested that he see urology, particularly if the level of testosterone is low. I recommended Dr. Del Toro.  I will notify him of the results when they are available.    RTC:  I will see him back in 4 months      Electronically signed by Calderon Barrett MD

## 2018-12-20 LAB — TESTOST SERPL-MCNC: 899 NG/DL (ref 264–916)

## 2018-12-21 DIAGNOSIS — M19.90 CHRONIC INFLAMMATORY ARTHRITIS: ICD-10-CM

## 2018-12-22 RX ORDER — PREDNISONE 2.5 MG/1
TABLET ORAL
Qty: 60 TABLET | Refills: 5 | Status: SHIPPED | OUTPATIENT
Start: 2018-12-22 | End: 2019-06-27 | Stop reason: SDUPTHER

## 2019-03-29 DIAGNOSIS — M19.90 CHRONIC INFLAMMATORY ARTHRITIS: ICD-10-CM

## 2019-03-30 RX ORDER — HYDROXYCHLOROQUINE SULFATE 200 MG/1
TABLET, FILM COATED ORAL
Qty: 60 TABLET | Refills: 5 | Status: SHIPPED | OUTPATIENT
Start: 2019-03-30 | End: 2019-09-10 | Stop reason: SDUPTHER

## 2019-04-24 ENCOUNTER — OFFICE VISIT (OUTPATIENT)
Dept: RHEUMATOLOGY | Facility: CLINIC | Age: 69
End: 2019-04-24
Payer: MEDICARE

## 2019-04-24 VITALS
BODY MASS INDEX: 27.27 KG/M2 | WEIGHT: 174.13 LBS | DIASTOLIC BLOOD PRESSURE: 84 MMHG | SYSTOLIC BLOOD PRESSURE: 135 MMHG

## 2019-04-24 DIAGNOSIS — Z79.899 ENCOUNTER FOR LONG-TERM (CURRENT) DRUG USE: ICD-10-CM

## 2019-04-24 DIAGNOSIS — M19.90 CHRONIC INFLAMMATORY ARTHRITIS: Primary | ICD-10-CM

## 2019-04-24 LAB
ALBUMIN SERPL-MCNC: 4.1 G/DL (ref 3.1–4.7)
ALP SERPL-CCNC: 48 IU/L (ref 40–104)
ALT (SGPT): 21 IU/L (ref 3–33)
AST SERPL-CCNC: 18 IU/L (ref 10–40)
BASOPHILS NFR BLD: 0 K/UL (ref 0–0.2)
BASOPHILS NFR BLD: 0.5 %
BILIRUB SERPL-MCNC: 1.1 MG/DL (ref 0.3–1)
BUN SERPL-MCNC: 15 MG/DL (ref 8–20)
CALCIUM SERPL-MCNC: 9.3 MG/DL (ref 7.7–10.4)
CHLORIDE: 102 MMOL/L (ref 98–110)
CO2 SERPL-SCNC: 29.4 MMOL/L (ref 22.8–31.6)
CREATININE: 0.82 MG/DL (ref 0.6–1.4)
CRP SERPL-MCNC: 0.23 MG/DL (ref 0–1.4)
EOSINOPHIL NFR BLD: 0.2 K/UL (ref 0–0.7)
EOSINOPHIL NFR BLD: 2.9 %
ERYTHROCYTE [DISTWIDTH] IN BLOOD BY AUTOMATED COUNT: 13.1 % (ref 11.7–14.9)
GLUCOSE: 85 MG/DL (ref 70–99)
GRAN #: 3.6 K/UL (ref 1.4–6.5)
GRAN%: 55.1 %
HCT VFR BLD AUTO: 41.5 % (ref 39–55)
HGB BLD-MCNC: 13.5 G/DL (ref 14–16)
IMMATURE GRANS (ABS): 0 K/UL (ref 0–1)
IMMATURE GRANULOCYTES: 0.3 %
LYMPH #: 2 K/UL (ref 1.2–3.4)
LYMPH%: 30.9 %
MCH RBC QN AUTO: 29.8 PG (ref 25–35)
MCHC RBC AUTO-ENTMCNC: 32.5 G/DL (ref 31–36)
MCV RBC AUTO: 91.6 FL (ref 80–100)
MONO #: 0.7 K/UL (ref 0.1–0.6)
MONO%: 10.3 %
NUCLEATED RBCS: 0 %
PLATELET # BLD AUTO: 256 K/UL (ref 140–440)
PMV BLD AUTO: 10.3 FL (ref 8.8–12.7)
POTASSIUM SERPL-SCNC: 4.6 MMOL/L (ref 3.5–5)
PROT SERPL-MCNC: 7 G/DL (ref 6–8.2)
RBC # BLD AUTO: 4.53 M/UL (ref 4.3–5.9)
SODIUM: 139 MMOL/L (ref 134–144)
WBC # BLD AUTO: 6.5 K/UL (ref 5–10)

## 2019-04-24 PROCEDURE — 99213 PR OFFICE/OUTPT VISIT, EST, LEVL III, 20-29 MIN: ICD-10-PCS | Mod: ,,, | Performed by: INTERNAL MEDICINE

## 2019-04-24 PROCEDURE — 99213 OFFICE O/P EST LOW 20 MIN: CPT | Mod: ,,, | Performed by: INTERNAL MEDICINE

## 2019-04-24 NOTE — PROGRESS NOTES
[facility] RHEUMATOLOGY           Follow-up visit    Notes dictated via Dragon to EPIC. Please forgive any unintended errors.  Subjective:       Patient ID:   NAME: Kyrie Norton Jr. : 1950     69 y.o. male    Referring Doc: No ref. provider found  Other Physicians:    Chief Complaint:  Rheumatoid Arthritis (need refills)      HPI/Interval History:  Patient has had no problems with arthritis. He has not had any red, hot, and/or swollen joints. He has no morning stiffness. He is able to perform all of his chores and ADLs.          ROS:   GEN:    no fever, night sweats or weight loss  SKIN:   no rashes , erythema, bruising, or swelling, no Raynauds, no photosensitivity  HEENT: no changes in vision, no mouth ulcers, no sicca symptoms, no scalp tenderness, no jaw claudication.  CV:      no CP, PND, JUAN or orthopnea, no palpitations  PULM: no SOB, cough, hemoptysis, sputum or pleuritic pain  GI:       no GERD, no dysphagia, no abdominal pain, nausea, vomiting, constipation, diarrhea, melanotic stools, BRBPR, or hematemesis  :      no hematuria, dysuria  NEURO: no paresthesias, headaches, acute visual disturbances  MUSCULOSKELETAL:  No muscle or joint complaints  PSYCH:   No insomnia, no increased anxiety or depression    Medications:    Current Outpatient Medications:     aspirin (ECOTRIN) 81 MG EC tablet, Take 81 mg by mouth once daily., Disp: , Rfl:     atorvastatin (LIPITOR) 40 MG tablet, Take 1 tablet (40 mg total) by mouth once daily., Disp: 90 tablet, Rfl: 4    blood sugar diagnostic (ACCU-CHEK CJ PLUS TEST STRP) Strp, 1 strip by Subdermal route Daily., Disp: 100 strip, Rfl: 4    fish oil-omega-3 fatty acids 300-1,000 mg capsule, Take 2 g by mouth once daily., Disp: , Rfl:     folic acid (FOLVITE) 1 MG tablet, TAKE 1 TABLET BY MOUTH DAILY, Disp: 90 tablet, Rfl: 3    glipizide-metformin (METAGLIP) 5-500 mg per tablet, Take 2 tablets by mouth once daily., Disp: 180 tablet, Rfl: 3     hydroxychloroquine (PLAQUENIL) 200 mg tablet, TAKE 1 TABLET BY MOUTH EVERY 12 HOURS, Disp: 60 tablet, Rfl: 5    ibuprofen (ADVIL,MOTRIN) 800 MG tablet, Take 1 tablet (800 mg total) by mouth every 6 (six) hours as needed for Pain., Disp: 20 tablet, Rfl: 0    lancets (ACCU-CHEK FASTCLIX) Misc, 1 Device by Misc.(Non-Drug; Combo Route) route Daily., Disp: 100 each, Rfl: 4    methotrexate 2.5 MG Tab, TAKE 5 TABLETS BY MOUTH EVERY 7 DAYS, Disp: 60 tablet, Rfl: 3    milk thistle 175 mg tablet, Take 175 mg by mouth once daily., Disp: , Rfl:     predniSONE (DELTASONE) 2.5 MG tablet, TAKE 1 TABLET(2.5 MG) BY MOUTH TWICE DAILY, Disp: 60 tablet, Rfl: 5    sildenafil (VIAGRA) 100 MG tablet, Take 1 tablet (100 mg total) by mouth daily as needed for Erectile Dysfunction., Disp: 4 tablet, Rfl: 4    tadalafil (CIALIS) 5 MG tablet, Take 1 tablet (5 mg total) by mouth daily as needed for Erectile Dysfunction., Disp: 30 tablet, Rfl: 6      FAMILY HISTORY: negative for Connective Tissue Disease        Review of patient's allergies indicates:   Allergen Reactions    Parafon forte Nausea Only             Objective:     Vitals:  Blood pressure 135/84, weight 79 kg (174 lb 1.6 oz).    Physical Examination:   GEN: wn/wd male in no apparent distress  SKIN: no rashes, no sclerodactyly, no Raynaud's, no periungual erythema, no digital tip ulcerations, no nailbed pitting  HEAD: no alopecia, no scalp tenderness, no temporal artery tenderness or induration.  EYES: no pallor, no icterus, PERRLA  ENT:  no thrush, no mucosal dryness or ulcerations, adequate oral hygiene & dentition.  NECK: supple x 6, no masses, no thyromegaly, no lymphadenopathy.  CV:   S1 and S2 regular, no murmurs, gallop or rubs  CHEST: Normal respiratory effort;  normal breath sounds/no adventitious sounds. No signs of consolidation.  ABD: non-tender and non-distended; soft; normal bowel sounds; no rebound/guarding or tenderness. No hepatosplenomegaly.  Musculoskeletal:   No evidence of active inflammatory arthritis. No progressive deformity  EXTREM: no clubbing, cyanosis or edema. normal pulses.  NEURO:  grossly intact; motor/sensory WNL; no tremors  PSYCH:  normal mood, affect and behavior            Labs:   Lab Results   Component Value Date    WBC 5.8 12/18/2018    HGB 13.6 (L) 12/18/2018    HCT 42.0 12/18/2018    MCV 91.3 12/18/2018     12/18/2018   CMP@  Sodium   Date Value Ref Range Status   12/18/2018 139 134 - 144 mmol/L      Potassium   Date Value Ref Range Status   12/18/2018 4.9 3.5 - 5.0 mmol/L      Chloride   Date Value Ref Range Status   12/18/2018 100 98 - 110 mmol/L      CO2   Date Value Ref Range Status   12/18/2018 28.6 22.8 - 31.6 mmol/L      Glucose   Date Value Ref Range Status   12/18/2018 77 70 - 99 mg/dL      BUN, Bld   Date Value Ref Range Status   12/18/2018 10 8 - 20 mg/dL      Creatinine   Date Value Ref Range Status   12/18/2018 0.82 0.60 - 1.40 mg/dL      Calcium   Date Value Ref Range Status   12/18/2018 9.4 7.7 - 10.4 mg/dL      Total Protein   Date Value Ref Range Status   12/18/2018 7.2 6.0 - 8.2 g/dL      Albumin   Date Value Ref Range Status   12/18/2018 4.1 3.1 - 4.7 g/dL      Total Bilirubin   Date Value Ref Range Status   12/18/2018 0.6 0.3 - 1.0 mg/dL      Alkaline Phosphatase   Date Value Ref Range Status   12/18/2018 48 40 - 104 IU/L      AST   Date Value Ref Range Status   12/18/2018 24 10 - 40 IU/L      ALT   Date Value Ref Range Status   12/14/2015 21 10 - 44 U/L Final     CRP   Date Value Ref Range Status   12/18/2018 <0.02 0.00 - 1.40 mg/dL      Rheumatoid Factor   Date Value Ref Range Status   09/26/2017 <10.0 0.0 - 13.9 IU/mL      Comment:     Performed at: [facility], [facility] [address], [id number] Ragland MD, Phone:  [phone]         Radiology/Diagnostic Studies:    None    Assessment/Discussion/Plan:   69 y.o. male with seronegative rheumatoid arthritis-excellent response to methotrexate  12.5 mg weekly, hydroxychloroquine 400 mg daily, and prednisone 2.5 mg twice daily      PLAN:  I will continue his medication unchanged. Routine blood testing was obtained.      RTC:  I will see him back in 4 months.      Electronically signed by Calderon Barrett MD

## 2019-06-27 DIAGNOSIS — M19.90 CHRONIC INFLAMMATORY ARTHRITIS: ICD-10-CM

## 2019-06-27 RX ORDER — PREDNISONE 2.5 MG/1
TABLET ORAL
Qty: 60 TABLET | Refills: 5 | Status: SHIPPED | OUTPATIENT
Start: 2019-06-27 | End: 2019-12-09 | Stop reason: SDUPTHER

## 2019-06-30 DIAGNOSIS — M05.79 RHEUMATOID ARTHRITIS INVOLVING MULTIPLE SITES WITH POSITIVE RHEUMATOID FACTOR: ICD-10-CM

## 2019-07-01 RX ORDER — METHOTREXATE 2.5 MG/1
TABLET ORAL
Qty: 60 TABLET | Refills: 1 | Status: SHIPPED | OUTPATIENT
Start: 2019-07-01 | End: 2019-12-30 | Stop reason: SDUPTHER

## 2019-08-06 ENCOUNTER — OFFICE VISIT (OUTPATIENT)
Dept: RHEUMATOLOGY | Facility: CLINIC | Age: 69
End: 2019-08-06
Payer: MEDICARE

## 2019-08-06 ENCOUNTER — LAB VISIT (OUTPATIENT)
Dept: LAB | Facility: HOSPITAL | Age: 69
End: 2019-08-06
Attending: INTERNAL MEDICINE
Payer: MEDICARE

## 2019-08-06 VITALS
SYSTOLIC BLOOD PRESSURE: 118 MMHG | WEIGHT: 177.31 LBS | DIASTOLIC BLOOD PRESSURE: 77 MMHG | BODY MASS INDEX: 27.77 KG/M2

## 2019-08-06 DIAGNOSIS — Z79.899 ENCOUNTER FOR LONG-TERM (CURRENT) DRUG USE: ICD-10-CM

## 2019-08-06 DIAGNOSIS — M19.90 CHRONIC INFLAMMATORY ARTHRITIS: ICD-10-CM

## 2019-08-06 DIAGNOSIS — M19.90 CHRONIC INFLAMMATORY ARTHRITIS: Primary | ICD-10-CM

## 2019-08-06 LAB
ALBUMIN SERPL BCP-MCNC: 4.2 G/DL (ref 3.5–5.2)
ALP SERPL-CCNC: 43 U/L (ref 55–135)
ALT SERPL W/O P-5'-P-CCNC: 24 U/L (ref 10–44)
ANION GAP SERPL CALC-SCNC: 6 MMOL/L (ref 8–16)
AST SERPL-CCNC: 18 U/L (ref 10–40)
BASOPHILS # BLD AUTO: 0.02 K/UL (ref 0–0.2)
BASOPHILS NFR BLD: 0.3 % (ref 0–1.9)
BILIRUB SERPL-MCNC: 0.9 MG/DL (ref 0.1–1)
BUN SERPL-MCNC: 12 MG/DL (ref 8–23)
CALCIUM SERPL-MCNC: 9.6 MG/DL (ref 8.7–10.5)
CHLORIDE SERPL-SCNC: 105 MMOL/L (ref 95–110)
CO2 SERPL-SCNC: 30 MMOL/L (ref 23–29)
CREAT SERPL-MCNC: 0.9 MG/DL (ref 0.5–1.4)
CRP SERPL-MCNC: 0.03 MG/DL (ref 0–0.75)
DIFFERENTIAL METHOD: ABNORMAL
EOSINOPHIL # BLD AUTO: 0.1 K/UL (ref 0–0.5)
EOSINOPHIL NFR BLD: 1.9 % (ref 0–8)
ERYTHROCYTE [DISTWIDTH] IN BLOOD BY AUTOMATED COUNT: 13.2 % (ref 11.5–14.5)
EST. GFR  (AFRICAN AMERICAN): >60 ML/MIN/1.73 M^2
EST. GFR  (NON AFRICAN AMERICAN): >60 ML/MIN/1.73 M^2
GLUCOSE SERPL-MCNC: 103 MG/DL (ref 70–110)
HCT VFR BLD AUTO: 42.7 % (ref 40–54)
HGB BLD-MCNC: 13.6 G/DL (ref 14–18)
IMM GRANULOCYTES # BLD AUTO: 0.03 K/UL (ref 0–0.04)
IMM GRANULOCYTES NFR BLD AUTO: 0.4 % (ref 0–0.5)
LYMPHOCYTES # BLD AUTO: 1.9 K/UL (ref 1–4.8)
LYMPHOCYTES NFR BLD: 27.6 % (ref 18–48)
MCH RBC QN AUTO: 29.8 PG (ref 27–31)
MCHC RBC AUTO-ENTMCNC: 31.9 G/DL (ref 32–36)
MCV RBC AUTO: 94 FL (ref 82–98)
MONOCYTES # BLD AUTO: 0.6 K/UL (ref 0.3–1)
MONOCYTES NFR BLD: 8.4 % (ref 4–15)
NEUTROPHILS # BLD AUTO: 4.3 K/UL (ref 1.8–7.7)
NEUTROPHILS NFR BLD: 61.4 % (ref 38–73)
NRBC BLD-RTO: 0 /100 WBC
PLATELET # BLD AUTO: 251 K/UL (ref 150–350)
PMV BLD AUTO: 10.5 FL (ref 9.2–12.9)
POTASSIUM SERPL-SCNC: 4.8 MMOL/L (ref 3.5–5.1)
PROT SERPL-MCNC: 7.3 G/DL (ref 6–8.4)
RBC # BLD AUTO: 4.56 M/UL (ref 4.6–6.2)
SODIUM SERPL-SCNC: 141 MMOL/L (ref 136–145)
WBC # BLD AUTO: 6.92 K/UL (ref 3.9–12.7)

## 2019-08-06 PROCEDURE — 99213 OFFICE O/P EST LOW 20 MIN: CPT | Mod: S$GLB,,, | Performed by: INTERNAL MEDICINE

## 2019-08-06 PROCEDURE — 80053 COMPREHEN METABOLIC PANEL: CPT

## 2019-08-06 PROCEDURE — 1101F PT FALLS ASSESS-DOCD LE1/YR: CPT | Mod: S$GLB,,, | Performed by: INTERNAL MEDICINE

## 2019-08-06 PROCEDURE — 86140 C-REACTIVE PROTEIN: CPT

## 2019-08-06 PROCEDURE — 99213 PR OFFICE/OUTPT VISIT, EST, LEVL III, 20-29 MIN: ICD-10-PCS | Mod: S$GLB,,, | Performed by: INTERNAL MEDICINE

## 2019-08-06 PROCEDURE — 85025 COMPLETE CBC W/AUTO DIFF WBC: CPT

## 2019-08-06 PROCEDURE — 1101F PR PT FALLS ASSESS DOC 0-1 FALLS W/OUT INJ PAST YR: ICD-10-PCS | Mod: S$GLB,,, | Performed by: INTERNAL MEDICINE

## 2019-08-06 PROCEDURE — 36415 COLL VENOUS BLD VENIPUNCTURE: CPT

## 2019-08-06 NOTE — PROGRESS NOTES
[facility] RHEUMATOLOGY           Follow-up visit    Notes dictated via Dragon to EPIC. Please forgive any unintended errors.  Subjective:       Patient ID:   NAME: Kyrie Norton Jr. : 1950     69 y.o. male    Referring Doc: No ref. provider found  Other Physicians:    Chief Complaint:  Rheumatoid Arthritis      HPI/Interval History:   The patient is doing well. He has no complaints of any red, hot, and/or swollen joints. He has no morning stiffness.    ROS:   GEN:    no fever, night sweats or weight loss  SKIN:   no rashes, erythema, bruising, or swelling, no Raynauds, no photosensitivity  HEENT: no changes in vision, no mouth ulcers, no sicca symptoms, no scalp tenderness, no jaw claudication.  CV:      no CP, PND, JUAN, orthopnea, no palpitations  PULM: no SOB, cough, hemoptysis, sputum or pleuritic pain  GI:       no GERD, no dysphagia, no hematemesis, no abdominal pain, nausea, vomiting, constipation, diarrhea, melanotic stools, BRBPR  :      no hematuria, dysuria  NEURO: no paresthesias, headaches, acute visual disturbances  MUSCULOSKELETAL:  No muscle or joint complaints  PSYCH:   No insomnia, no increased anxiety, no increased depression    Past Medical/Surgical History:  Past Medical History:   Diagnosis Date    Diabetes mellitus type II     Hyperlipidemia     Hypertension      History reviewed. No pertinent surgical history.    Allergies:  Review of patient's allergies indicates:   Allergen Reactions    Parafon forte Nausea Only       Social/Family History:  Social History     Socioeconomic History    Marital status:      Spouse name: Not on file    Number of children: Not on file    Years of education: Not on file    Highest education level: Not on file   Occupational History    Not on file   Social Needs    Financial resource strain: Not on file    Food insecurity:     Worry: Not on file     Inability: Not on file    Transportation needs:     Medical: Not on file      Non-medical: Not on file   Tobacco Use    Smoking status: Never Smoker    Smokeless tobacco: Never Used   Substance and Sexual Activity    Alcohol use: Yes     Comment: occasional    Drug use: No    Sexual activity: Not on file   Lifestyle    Physical activity:     Days per week: Not on file     Minutes per session: Not on file    Stress: Not on file   Relationships    Social connections:     Talks on phone: Not on file     Gets together: Not on file     Attends Christianity service: Not on file     Active member of club or organization: Not on file     Attends meetings of clubs or organizations: Not on file     Relationship status: Not on file   Other Topics Concern    Not on file   Social History Narrative    Not on file     Family History   Problem Relation Age of Onset    Blindness Neg Hx     Glaucoma Neg Hx     Macular degeneration Neg Hx      FAMILY HISTORY: negative for Connective Tissue Disease      Medications:    Current Outpatient Medications:     aspirin (ECOTRIN) 81 MG EC tablet, Take 81 mg by mouth once daily., Disp: , Rfl:     atorvastatin (LIPITOR) 40 MG tablet, Take 1 tablet (40 mg total) by mouth once daily., Disp: 90 tablet, Rfl: 4    blood sugar diagnostic (ACCU-CHEK CJ PLUS TEST STRP) Strp, 1 strip by Subdermal route Daily., Disp: 100 strip, Rfl: 4    canagliflozin (INVOKANA) 300 mg Tab tablet, Take 300 mg by mouth., Disp: , Rfl:     fish oil-omega-3 fatty acids 300-1,000 mg capsule, Take 2 g by mouth once daily., Disp: , Rfl:     folic acid (FOLVITE) 1 MG tablet, TAKE 1 TABLET BY MOUTH DAILY, Disp: 90 tablet, Rfl: 3    glipizide-metformin (METAGLIP) 5-500 mg per tablet, Take 2 tablets by mouth once daily., Disp: 180 tablet, Rfl: 3    hydroxychloroquine (PLAQUENIL) 200 mg tablet, TAKE 1 TABLET BY MOUTH EVERY 12 HOURS, Disp: 60 tablet, Rfl: 5    ibuprofen (ADVIL,MOTRIN) 800 MG tablet, Take 1 tablet (800 mg total) by mouth every 6 (six) hours as needed for Pain., Disp: 20  tablet, Rfl: 0    lancets (ACCU-CHEK FASTCLIX) Misc, 1 Device by Misc.(Non-Drug; Combo Route) route Daily., Disp: 100 each, Rfl: 4    methotrexate 2.5 MG Tab, TAKE 5 TABLETS BY MOUTH EVERY 7 DAYS, Disp: 60 tablet, Rfl: 1    milk thistle 175 mg tablet, Take 175 mg by mouth once daily., Disp: , Rfl:     predniSONE (DELTASONE) 2.5 MG tablet, TAKE 1 TABLET(2.5 MG) BY MOUTH TWICE DAILY, Disp: 60 tablet, Rfl: 5    sildenafil (VIAGRA) 100 MG tablet, Take 1 tablet (100 mg total) by mouth daily as needed for Erectile Dysfunction., Disp: 4 tablet, Rfl: 4    tadalafil (CIALIS) 5 MG tablet, Take 1 tablet (5 mg total) by mouth daily as needed for Erectile Dysfunction., Disp: 30 tablet, Rfl: 6      Objective:     Vitals:  Blood pressure 118/77, weight 80.4 kg (177 lb 4.8 oz).    Physical Examination:   GEN: wn/wd male in no apparent distress  SKIN: no rashes, no sclerodactyly, no Raynaud's, no periungual erythema, no digital tip ulcerations, no nailbed pitting  HEAD: no alopecia, no scalp tenderness, no temporal artery tenderness or induration.  EYES: no pallor, no icterus, PERRLA  ENT:  no thrush, no mucosal dryness or ulcerations, adequate oral hygiene & dentition.  NECK: supple x 6, no masses, no thyromegaly, no lymphadenopathy.  CV:   S1 and S2 regular, no murmurs, gallop or rubs  CHEST: Normal respiratory effort;  normal breath sounds/no adventitious sounds. No signs of consolidation.  ABD: non-tender and non-distended; soft; normal bowel sounds; no rebound/guarding or tenderness. No hepatosplenomegaly.  Musculoskeletal:  No evidence of active synovitis. No progressive deformity  EXTREM: no clubbing, cyanosis or edema. normal pulses.  NEURO:  grossly intact; motor/sensory WNL; no tremors  PSYCH:  normal mood, affect and behavior    Labs:   Lab Results   Component Value Date    WBC 6.5 04/24/2019    HGB 13.5 (L) 04/24/2019    HCT 41.5 04/24/2019    MCV 91.6 04/24/2019     04/24/2019   CMP@  Sodium   Date Value  Ref Range Status   04/24/2019 139 134 - 144 mmol/L      Potassium   Date Value Ref Range Status   04/24/2019 4.6 3.5 - 5.0 mmol/L      Chloride   Date Value Ref Range Status   04/24/2019 102 98 - 110 mmol/L      CO2   Date Value Ref Range Status   04/24/2019 29.4 22.8 - 31.6 mmol/L      Glucose   Date Value Ref Range Status   04/24/2019 85 70 - 99 mg/dL      BUN, Bld   Date Value Ref Range Status   04/24/2019 15 8 - 20 mg/dL      Creatinine   Date Value Ref Range Status   04/24/2019 0.82 0.60 - 1.40 mg/dL      Calcium   Date Value Ref Range Status   04/24/2019 9.3 7.7 - 10.4 mg/dL      Total Protein   Date Value Ref Range Status   04/24/2019 7.0 6.0 - 8.2 g/dL      Albumin   Date Value Ref Range Status   04/24/2019 4.1 3.1 - 4.7 g/dL      Total Bilirubin   Date Value Ref Range Status   04/24/2019 1.1 (H) 0.3 - 1.0 mg/dL      Alkaline Phosphatase   Date Value Ref Range Status   04/24/2019 48 40 - 104 IU/L      AST   Date Value Ref Range Status   04/24/2019 18 10 - 40 IU/L      ALT   Date Value Ref Range Status   12/14/2015 21 10 - 44 U/L Final     CRP   Date Value Ref Range Status   04/24/2019 0.23 0.00 - 1.40 mg/dL      Rheumatoid Factor   Date Value Ref Range Status   09/26/2017 <10.0 0.0 - 13.9 IU/mL      Comment:     Performed at: [facility] [address], [id number], [place], Phone:  [phone]       Radiology/Diagnostic Studies:    None    Assessment/Discussion/Plan:   69 y.o. male with seronegative inflammatory arthritis-excellent control on methotrexate 12.5 mg weekly, hydroxychloroquine 400 mg daily, and prednisone 2.5 mg twice daily    PLAN:  I will continue his medication unchanged. Routine blood testing was ordered and will be performed at his cardiologist's office this week.    RTC:  I will see him back in 4 months.    Electronically signed by Calderon Barrett MD

## 2019-09-10 ENCOUNTER — OFFICE VISIT (OUTPATIENT)
Dept: FAMILY MEDICINE | Facility: CLINIC | Age: 69
End: 2019-09-10
Payer: MEDICARE

## 2019-09-10 VITALS
WEIGHT: 177 LBS | SYSTOLIC BLOOD PRESSURE: 132 MMHG | HEART RATE: 72 BPM | BODY MASS INDEX: 27.72 KG/M2 | DIASTOLIC BLOOD PRESSURE: 78 MMHG

## 2019-09-10 DIAGNOSIS — M19.90 CHRONIC INFLAMMATORY ARTHRITIS: ICD-10-CM

## 2019-09-10 DIAGNOSIS — E11.69 HYPERLIPIDEMIA ASSOCIATED WITH TYPE 2 DIABETES MELLITUS: ICD-10-CM

## 2019-09-10 DIAGNOSIS — E11.9 TYPE 2 DIABETES MELLITUS WITHOUT RETINOPATHY: ICD-10-CM

## 2019-09-10 DIAGNOSIS — E78.5 HYPERLIPIDEMIA ASSOCIATED WITH TYPE 2 DIABETES MELLITUS: ICD-10-CM

## 2019-09-10 LAB — HBA1C MFR BLD: 6.3 %

## 2019-09-10 PROCEDURE — 99214 PR OFFICE/OUTPT VISIT, EST, LEVL IV, 30-39 MIN: ICD-10-PCS | Mod: S$GLB,,, | Performed by: PHYSICIAN ASSISTANT

## 2019-09-10 PROCEDURE — 83036 HEMOGLOBIN GLYCOSYLATED A1C: CPT | Mod: QW,,, | Performed by: PHYSICIAN ASSISTANT

## 2019-09-10 PROCEDURE — 99214 OFFICE O/P EST MOD 30 MIN: CPT | Mod: S$GLB,,, | Performed by: PHYSICIAN ASSISTANT

## 2019-09-10 PROCEDURE — 83036 POCT HEMOGLOBIN A1C: ICD-10-PCS | Mod: QW,,, | Performed by: PHYSICIAN ASSISTANT

## 2019-09-10 RX ORDER — ATORVASTATIN CALCIUM 40 MG/1
40 TABLET, FILM COATED ORAL DAILY
Qty: 90 TABLET | Refills: 4 | Status: SHIPPED | OUTPATIENT
Start: 2019-09-10 | End: 2020-12-17 | Stop reason: SDUPTHER

## 2019-09-10 RX ORDER — GLIPIZIDE AND METFORMIN HCL 5; 500 MG/1; MG/1
TABLET, FILM COATED ORAL
Qty: 270 TABLET | Refills: 1 | Status: SHIPPED | OUTPATIENT
Start: 2019-09-10 | End: 2019-10-30 | Stop reason: SDUPTHER

## 2019-09-10 RX ORDER — HYDROXYCHLOROQUINE SULFATE 200 MG/1
200 TABLET, FILM COATED ORAL EVERY 12 HOURS
Qty: 90 TABLET | Refills: 1 | Status: SHIPPED | OUTPATIENT
Start: 2019-09-10 | End: 2020-02-03

## 2019-09-10 RX ORDER — AA/PROT/LYSINE/METHIO/VIT C/B6 50-12.5 MG
10 TABLET ORAL DAILY
COMMUNITY

## 2019-09-10 NOTE — PROGRESS NOTES
SUBJECTIVE:    Patient ID: Kyrie Norton Jr. is a 69 y.o. male.    Chief Complaint: No chief complaint on file.    68 yo wm presents for regular checkup and refills. Reports that he has been doing pretty well. Feels fine. Staying active. He offers no new complaints at this time. He denies any CP/SOB..      Lab Visit on 08/06/2019   Component Date Value Ref Range Status    WBC 08/06/2019 6.92  3.90 - 12.70 K/uL Final    RBC 08/06/2019 4.56* 4.60 - 6.20 M/uL Final    Hemoglobin 08/06/2019 13.6* 14.0 - 18.0 g/dL Final    Hematocrit 08/06/2019 42.7  40.0 - 54.0 % Final    Mean Corpuscular Volume 08/06/2019 94  82 - 98 fL Final    Mean Corpuscular Hemoglobin 08/06/2019 29.8  27.0 - 31.0 pg Final    Mean Corpuscular Hemoglobin Conc 08/06/2019 31.9* 32.0 - 36.0 g/dL Final    RDW 08/06/2019 13.2  11.5 - 14.5 % Final    Platelets 08/06/2019 251  150 - 350 K/uL Final    MPV 08/06/2019 10.5  9.2 - 12.9 fL Final    Immature Granulocytes 08/06/2019 0.4  0.0 - 0.5 % Final    Gran # (ANC) 08/06/2019 4.3  1.8 - 7.7 K/uL Final    Immature Grans (Abs) 08/06/2019 0.03  0.00 - 0.04 K/uL Final    Lymph # 08/06/2019 1.9  1.0 - 4.8 K/uL Final    Mono # 08/06/2019 0.6  0.3 - 1.0 K/uL Final    Eos # 08/06/2019 0.1  0.0 - 0.5 K/uL Final    Baso # 08/06/2019 0.02  0.00 - 0.20 K/uL Final    nRBC 08/06/2019 0  0 /100 WBC Final    Gran% 08/06/2019 61.4  38.0 - 73.0 % Final    Lymph% 08/06/2019 27.6  18.0 - 48.0 % Final    Mono% 08/06/2019 8.4  4.0 - 15.0 % Final    Eosinophil% 08/06/2019 1.9  0.0 - 8.0 % Final    Basophil% 08/06/2019 0.3  0.0 - 1.9 % Final    Differential Method 08/06/2019 Automated   Final    Sodium 08/06/2019 141  136 - 145 mmol/L Final    Potassium 08/06/2019 4.8  3.5 - 5.1 mmol/L Final    Chloride 08/06/2019 105  95 - 110 mmol/L Final    CO2 08/06/2019 30* 23 - 29 mmol/L Final    Glucose 08/06/2019 103  70 - 110 mg/dL Final    BUN, Bld 08/06/2019 12  8 - 23 mg/dL Final    Creatinine  08/06/2019 0.9  0.5 - 1.4 mg/dL Final    Calcium 08/06/2019 9.6  8.7 - 10.5 mg/dL Final    Total Protein 08/06/2019 7.3  6.0 - 8.4 g/dL Final    Albumin 08/06/2019 4.2  3.5 - 5.2 g/dL Final    Total Bilirubin 08/06/2019 0.9  0.1 - 1.0 mg/dL Final    Alkaline Phosphatase 08/06/2019 43* 55 - 135 U/L Final    AST 08/06/2019 18  10 - 40 U/L Final    ALT 08/06/2019 24  10 - 44 U/L Final    Anion Gap 08/06/2019 6* 8 - 16 mmol/L Final    eGFR if African American 08/06/2019 >60.0  >60 mL/min/1.73 m^2 Final    eGFR if non African American 08/06/2019 >60.0  >60 mL/min/1.73 m^2 Final    CRP 08/06/2019 0.03  0.00 - 0.75 mg/dL Final   Office Visit on 04/24/2019   Component Date Value Ref Range Status    WBC 04/24/2019 6.5  5.0 - 10.0 K/uL Final    RBC 04/24/2019 4.53  4.30 - 5.90 M/uL Final    Hemoglobin 04/24/2019 13.5* 14.0 - 16.0 g/dL Final    Hematocrit 04/24/2019 41.5  39.0 - 55.0 % Final    Mean Corpuscular Volume 04/24/2019 91.6  80.0 - 100.0 fL Final    Mean Corpuscular Hemoglobin 04/24/2019 29.8  25.0 - 35.0 pg Final    Mean Corpuscular Hemoglobin Conc 04/24/2019 32.5  31.0 - 36.0 g/dL Final    RDW 04/24/2019 13.1  11.7 - 14.9 % Final    Platelets 04/24/2019 256  140 - 440 K/uL Final    MPV 04/24/2019 10.3  8.8 - 12.7 fL Final    Gran% 04/24/2019 55.1  % Final    Lymph% 04/24/2019 30.9  % Final    Mono% 04/24/2019 10.3  % Final    Eosinophil% 04/24/2019 2.9  % Final    Basophil% 04/24/2019 0.5  % Final    Gran # (ANC) 04/24/2019 3.6  1.4 - 6.5 K/uL Final    Lymph # 04/24/2019 2.0  1.2 - 3.4 K/uL Final    Mono # 04/24/2019 0.7* 0.1 - 0.6 K/uL Final    Eos # 04/24/2019 0.2  0.0 - 0.7 K/uL Final    Baso # 04/24/2019 0.0  0.0 - 0.2 K/uL Final    Immature Grans (Abs) 04/24/2019 0.0  0.0 - 1.0 K/uL Final    Immature Granulocytes 04/24/2019 0.3  % Final    nRBC% 04/24/2019 0  % Final    Glucose 04/24/2019 85  70 - 99 mg/dL Final    BUN, Bld 04/24/2019 15  8 - 20 mg/dL Final    Creatinine  04/24/2019 0.82  0.60 - 1.40 mg/dL Final    Calcium 04/24/2019 9.3  7.7 - 10.4 mg/dL Final    Sodium 04/24/2019 139  134 - 144 mmol/L Final    Potassium 04/24/2019 4.6  3.5 - 5.0 mmol/L Final    Chloride 04/24/2019 102  98 - 110 mmol/L Final    CO2 04/24/2019 29.4  22.8 - 31.6 mmol/L Final    Albumin 04/24/2019 4.1  3.1 - 4.7 g/dL Final    Total Bilirubin 04/24/2019 1.1* 0.3 - 1.0 mg/dL Final    Alkaline Phosphatase 04/24/2019 48  40 - 104 IU/L Final    Total Protein 04/24/2019 7.0  6.0 - 8.2 g/dL Final    ALT (SGPT) 04/24/2019 21  3 - 33 IU/L Final    AST 04/24/2019 18  10 - 40 IU/L Final    CRP 04/24/2019 0.23  0.00 - 1.40 mg/dL Final       Past Medical History:   Diagnosis Date    Diabetes mellitus type II     Hyperlipidemia     Hypertension      History reviewed. No pertinent surgical history.  Family History   Problem Relation Age of Onset    Blindness Neg Hx     Glaucoma Neg Hx     Macular degeneration Neg Hx        Marital Status:   Alcohol History:  reports that he drinks alcohol.  Tobacco History:  reports that he has never smoked. He has never used smokeless tobacco.  Drug History:  reports that he does not use drugs.    Review of patient's allergies indicates:   Allergen Reactions    Parafon forte Nausea Only       Current Outpatient Medications:     aspirin (ECOTRIN) 81 MG EC tablet, Take 81 mg by mouth once daily., Disp: , Rfl:     atorvastatin (LIPITOR) 40 MG tablet, Take 1 tablet (40 mg total) by mouth once daily., Disp: 90 tablet, Rfl: 4    coenzyme Q10 (CO Q-10) 10 mg capsule, Take 10 mg by mouth once daily., Disp: , Rfl:     fish oil-omega-3 fatty acids 300-1,000 mg capsule, Take 2 g by mouth once daily., Disp: , Rfl:     folic acid (FOLVITE) 1 MG tablet, TAKE 1 TABLET BY MOUTH DAILY, Disp: 90 tablet, Rfl: 3    glipizide-metformin (METAGLIP) 5-500 mg per tablet, 1 tablet in the am, 2 tablets in the pm., Disp: 270 tablet, Rfl: 1    hydroxychloroquine (PLAQUENIL) 200  mg tablet, Take 1 tablet (200 mg total) by mouth every 12 (twelve) hours., Disp: 90 tablet, Rfl: 1    methotrexate 2.5 MG Tab, TAKE 5 TABLETS BY MOUTH EVERY 7 DAYS, Disp: 60 tablet, Rfl: 1    milk thistle 175 mg tablet, Take 175 mg by mouth once daily., Disp: , Rfl:     predniSONE (DELTASONE) 2.5 MG tablet, TAKE 1 TABLET(2.5 MG) BY MOUTH TWICE DAILY, Disp: 60 tablet, Rfl: 5    blood sugar diagnostic (ACCU-CHEK CJ PLUS TEST STRP) Strp, 1 strip by Subdermal route Daily., Disp: 100 strip, Rfl: 4    ibuprofen (ADVIL,MOTRIN) 800 MG tablet, Take 1 tablet (800 mg total) by mouth every 6 (six) hours as needed for Pain., Disp: 20 tablet, Rfl: 0    lancets (ACCU-CHEK FASTCLIX) Misc, 1 Device by Misc.(Non-Drug; Combo Route) route Daily., Disp: 100 each, Rfl: 4    sildenafil (VIAGRA) 100 MG tablet, Take 1 tablet (100 mg total) by mouth daily as needed for Erectile Dysfunction., Disp: 4 tablet, Rfl: 4    tadalafil (CIALIS) 5 MG tablet, Take 1 tablet (5 mg total) by mouth daily as needed for Erectile Dysfunction., Disp: 30 tablet, Rfl: 6    Review of Systems   Constitutional: Negative for activity change, fatigue, fever and unexpected weight change.   HENT: Negative for congestion.    Respiratory: Negative for apnea, cough, chest tightness and shortness of breath.    Cardiovascular: Negative for chest pain and palpitations.   Gastrointestinal: Negative for abdominal distention and abdominal pain.   Genitourinary: Negative for difficulty urinating and dysuria.   Musculoskeletal: Negative for arthralgias and back pain.   Neurological: Negative for dizziness and weakness.          Objective:      Vitals:    09/10/19 1016   BP: 132/78   Pulse: 72   Weight: 80.3 kg (177 lb)     Physical Exam   Constitutional: He is oriented to person, place, and time. He appears well-developed and well-nourished. No distress.   HENT:   Head: Normocephalic and atraumatic.   Eyes: Pupils are equal, round, and reactive to light.   Neck: Normal  range of motion. Neck supple. No thyromegaly present.   Cardiovascular: Normal rate, regular rhythm, normal heart sounds and intact distal pulses.   Pulmonary/Chest: Effort normal and breath sounds normal.   Abdominal: Soft. Bowel sounds are normal. He exhibits no distension. There is no tenderness.   Musculoskeletal: Normal range of motion.   Neurological: He is alert and oriented to person, place, and time. No cranial nerve deficit.   Skin: Skin is warm and dry. No rash noted. No erythema.         Assessment:       1. Chronic inflammatory arthritis    2. Type 2 diabetes mellitus without retinopathy    3. Hyperlipidemia associated with type 2 diabetes mellitus         Plan:       Chronic inflammatory arthritis  Comments:  he follows with Dr. Barrett, rheumatology. On DMARDs and stable. appt coming up in Sept.  Orders:  -     hydroxychloroquine (PLAQUENIL) 200 mg tablet; Take 1 tablet (200 mg total) by mouth every 12 (twelve) hours.  Dispense: 90 tablet; Refill: 1    Type 2 diabetes mellitus without retinopathy  Comments:  A1c 6.3 today and very well managed. I am making no changes to his current regemin.  Orders:  -     Hemoglobin A1C, POCT  -     glipizide-metformin (METAGLIP) 5-500 mg per tablet; 1 tablet in the am, 2 tablets in the pm.  Dispense: 270 tablet; Refill: 1    Hyperlipidemia associated with type 2 diabetes mellitus  Comments:  stable, refills today.  Orders:  -     atorvastatin (LIPITOR) 40 MG tablet; Take 1 tablet (40 mg total) by mouth once daily.  Dispense: 90 tablet; Refill: 4      No follow-ups on file.        9/10/2019 Marco Antonio Loyola PA-C

## 2019-09-10 NOTE — PATIENT INSTRUCTIONS
"  Exercise to Manage Your Blood Sugar    Being physically active every day can help you manage your blood sugar. Thats because an active lifestyle can improve your bodys ability to use insulin. Daily activity can also help delay or prevent complications of diabetes. And its a great way to relieve stress. If you arent normally active, be sure to consult your healthcare provider before getting started.  How much activity do you need?  If daily activity is new to you, start slow and steady. Begin with 10 minutes of activity each day. Then work up to at least 150 minutes a week of physical activity. Don't let more than 2 days go by without being active. When sitting for long periods of time, get up for short sessions of light activity every 30 minutes  Just move!  You dont have to join a gym or own pricey sports equipment. Just get out and walk. Walking is an aerobic exercise that makes your heart and lungs work hard. It helps your heart and blood vessels. Walking needs only a sturdy pair of sneakers and your own two feet. The more you walk, the easier it gets:  · Schedule time every day to move your feet.  · Make it part of your daily routine.  · Walk with a friend or a group to keep it interesting and fun.  · Try taking several short walks during the day to meet your daily activity goal.  A pedometer makes every step count  A pedometer is a small device that keeps track of how many steps you take. You can clip it to your belt (or a strap on your arm or leg) and go about your daily routine. "Smartphones" now also have apps to record your walking. At the end of the day, the pedometer shows the total number of steps you took. Use a pedometer to set daily goals for yourself. For instance, if you walk 4,000 steps a day, try adding 200 more steps each day. Aim for a goal of 7,500. With every step, youre doing a little more to help your body use insulin.   Adding resistance exercise  Resistance exercise (also called " strength training), makes muscles stronger. It also helps muscles use insulin better. Ask your healthcare provider whether this type of exercise is right for you. If it is, your healthcare provider can help you work it in to your activity plan.  Staying safe  Being active may cause blood sugar to drop faster than usual. This is especially true if you take medicine to manage your blood sugar. But there are things you can do to help reduce the risk of accidental lows. Keep these tips in mind:  · Always carry identification when you exercise outside your home. Carry a cell phone to use in case of emergency.  · If you can, include friends and family in your activities.  · Wear a medical ID bracelet that says you have diabetes.  · Use the right safety equipment for the activity you do (such as a bicycle helmet when you ride a bicycle outdoors). Wear closed-toed shoes that fit your feet well.  · Drink plenty of water before and during activity.  · Keep a fast-acting sugar (such as glucose tablets) on hand in case of low blood sugar.  · Dress properly for the weather. Wear a hat if its radames, or wait until evening if its too hot.  · Avoid being active for long periods in very hot or very cold weather.  · Skip activity if youre sick.     Notice how activity affects blood sugar  Physical activity is important when you have diabetes. But you need to keep an eye on your blood sugar level. Check often if you have been active for longer than usual, or if the activity was unplanned. Make it a habit to check your blood sugar before being active. And check again a few hours later. Use your log book to write down how activity affects your numbers. If you take insulin, you may be able to adjust your dose before a planned activity. This can help prevent lows. You may also need to take a small carbohydrate snack before the exercise. Talk to your healthcare provider to learn more.    Date Last Reviewed: 6/1/2016  © 2983-8409 [employer]  [employer]. [address] [place] [address], [place] [address]. All rights reserved. This information is not intended as a substitute for professional medical care. Always follow your healthcare professional's instructions.

## 2019-10-02 DIAGNOSIS — Z79.631 METHOTREXATE, LONG TERM, CURRENT USE: ICD-10-CM

## 2019-10-02 RX ORDER — FOLIC ACID 1 MG/1
TABLET ORAL
Qty: 90 TABLET | Refills: 3 | Status: SHIPPED | OUTPATIENT
Start: 2019-10-02 | End: 2020-02-08 | Stop reason: SDUPTHER

## 2019-10-30 DIAGNOSIS — E11.9 TYPE 2 DIABETES MELLITUS WITHOUT RETINOPATHY: ICD-10-CM

## 2019-10-30 RX ORDER — GLIPIZIDE AND METFORMIN HCL 5; 500 MG/1; MG/1
TABLET, FILM COATED ORAL
Qty: 270 TABLET | Refills: 1 | Status: SHIPPED | OUTPATIENT
Start: 2019-10-30 | End: 2020-08-31 | Stop reason: SDUPTHER

## 2019-10-30 NOTE — TELEPHONE ENCOUNTER
----- Message from Kylah Corey sent at 10/30/2019  1:00 PM CDT -----  Contact: pt came in    Pt said his Glipizide -metformin 5 mg was never called in - he is out   [facility]     [phone]

## 2019-11-19 ENCOUNTER — OFFICE VISIT (OUTPATIENT)
Dept: RHEUMATOLOGY | Facility: CLINIC | Age: 69
End: 2019-11-19
Payer: MEDICARE

## 2019-11-19 VITALS
DIASTOLIC BLOOD PRESSURE: 87 MMHG | WEIGHT: 177.13 LBS | SYSTOLIC BLOOD PRESSURE: 136 MMHG | BODY MASS INDEX: 27.74 KG/M2

## 2019-11-19 DIAGNOSIS — M19.90 CHRONIC INFLAMMATORY ARTHRITIS: Primary | ICD-10-CM

## 2019-11-19 DIAGNOSIS — Z79.899 ENCOUNTER FOR LONG-TERM (CURRENT) DRUG USE: ICD-10-CM

## 2019-11-19 PROCEDURE — 1159F PR MEDICATION LIST DOCUMENTED IN MEDICAL RECORD: ICD-10-PCS | Mod: S$GLB,,, | Performed by: INTERNAL MEDICINE

## 2019-11-19 PROCEDURE — 1101F PR PT FALLS ASSESS DOC 0-1 FALLS W/OUT INJ PAST YR: ICD-10-PCS | Mod: S$GLB,,, | Performed by: INTERNAL MEDICINE

## 2019-11-19 PROCEDURE — 1125F PR PAIN SEVERITY QUANTIFIED, PAIN PRESENT: ICD-10-PCS | Mod: S$GLB,,, | Performed by: INTERNAL MEDICINE

## 2019-11-19 PROCEDURE — 99213 OFFICE O/P EST LOW 20 MIN: CPT | Mod: S$GLB,,, | Performed by: INTERNAL MEDICINE

## 2019-11-19 PROCEDURE — 1101F PT FALLS ASSESS-DOCD LE1/YR: CPT | Mod: S$GLB,,, | Performed by: INTERNAL MEDICINE

## 2019-11-19 PROCEDURE — 1125F AMNT PAIN NOTED PAIN PRSNT: CPT | Mod: S$GLB,,, | Performed by: INTERNAL MEDICINE

## 2019-11-19 PROCEDURE — 1159F MED LIST DOCD IN RCRD: CPT | Mod: S$GLB,,, | Performed by: INTERNAL MEDICINE

## 2019-11-19 PROCEDURE — 99213 PR OFFICE/OUTPT VISIT, EST, LEVL III, 20-29 MIN: ICD-10-PCS | Mod: S$GLB,,, | Performed by: INTERNAL MEDICINE

## 2019-11-19 RX ORDER — METHYLPREDNISOLONE 4 MG/1
TABLET ORAL
Qty: 1 PACKAGE | Refills: 0 | Status: SHIPPED | OUTPATIENT
Start: 2019-11-19 | End: 2019-12-09 | Stop reason: SDUPTHER

## 2019-11-19 NOTE — PROGRESS NOTES
[facility] RHEUMATOLOGY           Follow-up visit    Notes dictated to M*Modal. Please forgive any unintended errors.  Subjective:       Patient ID:   NAME: Kyrie Norton Jr. : 1950     69 y.o. male    Referring Doc: No ref. provider found  Other Physicians:    Chief Complaint:  Rheumatoid Arthritis      HPI/Interval History:  The patient is doing well.  He has no complaints of any red, hot, and/or swollen joints.    ROS:   GEN:    no fever, night sweats or weight loss  SKIN:   no rashes, erythema, bruising, or swelling, no Raynauds, no photosensitivity  HEENT: no changes in vision, no mouth ulcers, no sicca symptoms, no scalp tenderness, no jaw claudication.  CV:      no CP, PND, JUAN, orthopnea, no palpitations  PULM: no SOB, cough, hemoptysis, sputum or pleuritic pain  GI:       no GERD, no dysphagia, no hematemesis, no abdominal pain, nausea, vomiting, constipation, diarrhea, melanotic stools, BRBPR  :      no hematuria, dysuria  NEURO: no paresthesias, headaches, acute visual disturbances  MUSCULOSKELETAL:  No muscle or joint complaints  PSYCH:   No increased insomnia, no increased anxiety, no increased depression    Past Medical/Surgical History:  Past Medical History:   Diagnosis Date    Diabetes mellitus type II     Hyperlipidemia     Hypertension      History reviewed. No pertinent surgical history.    Allergies:  Review of patient's allergies indicates:   Allergen Reactions    Parafon forte Nausea Only       Social/Family History:  Social History     Socioeconomic History    Marital status:      Spouse name: Not on file    Number of children: Not on file    Years of education: Not on file    Highest education level: Not on file   Occupational History    Not on file   Social Needs    Financial resource strain: Not on file    Food insecurity:     Worry: Not on file     Inability: Not on file    Transportation needs:     Medical: Not on file     Non-medical: Not on file   Tobacco  Use    Smoking status: Never Smoker    Smokeless tobacco: Never Used   Substance and Sexual Activity    Alcohol use: Yes     Comment: occasional    Drug use: No    Sexual activity: Not on file   Lifestyle    Physical activity:     Days per week: Not on file     Minutes per session: Not on file    Stress: Not on file   Relationships    Social connections:     Talks on phone: Not on file     Gets together: Not on file     Attends Rastafari service: Not on file     Active member of club or organization: Not on file     Attends meetings of clubs or organizations: Not on file     Relationship status: Not on file   Other Topics Concern    Not on file   Social History Narrative    Not on file     Family History   Problem Relation Age of Onset    Blindness Neg Hx     Glaucoma Neg Hx     Macular degeneration Neg Hx      FAMILY HISTORY: negative for Connective Tissue Disease      Medications:    Current Outpatient Medications:     aspirin (ECOTRIN) 81 MG EC tablet, Take 81 mg by mouth once daily., Disp: , Rfl:     atorvastatin (LIPITOR) 40 MG tablet, Take 1 tablet (40 mg total) by mouth once daily., Disp: 90 tablet, Rfl: 4    blood sugar diagnostic (ACCU-CHEK CJ PLUS TEST STRP) Strp, 1 strip by Subdermal route Daily., Disp: 100 strip, Rfl: 4    coenzyme Q10 (CO Q-10) 10 mg capsule, Take 10 mg by mouth once daily., Disp: , Rfl:     fish oil-omega-3 fatty acids 300-1,000 mg capsule, Take 2 g by mouth once daily., Disp: , Rfl:     folic acid (FOLVITE) 1 MG tablet, TAKE 1 TABLET BY MOUTH DAILY, Disp: 90 tablet, Rfl: 3    glipizide-metformin (METAGLIP) 5-500 mg per tablet, 1 tablet in the am, 2 tablets in the pm., Disp: 270 tablet, Rfl: 1    hydroxychloroquine (PLAQUENIL) 200 mg tablet, Take 1 tablet (200 mg total) by mouth every 12 (twelve) hours., Disp: 90 tablet, Rfl: 1    ibuprofen (ADVIL,MOTRIN) 800 MG tablet, Take 1 tablet (800 mg total) by mouth every 6 (six) hours as needed for Pain., Disp: 20  tablet, Rfl: 0    lancets (ACCU-CHEK FASTCLIX) Misc, 1 Device by Misc.(Non-Drug; Combo Route) route Daily., Disp: 100 each, Rfl: 4    methotrexate 2.5 MG Tab, TAKE 5 TABLETS BY MOUTH EVERY 7 DAYS, Disp: 60 tablet, Rfl: 1    milk thistle 175 mg tablet, Take 175 mg by mouth once daily., Disp: , Rfl:     predniSONE (DELTASONE) 2.5 MG tablet, TAKE 1 TABLET(2.5 MG) BY MOUTH TWICE DAILY, Disp: 60 tablet, Rfl: 5    sildenafil (VIAGRA) 100 MG tablet, Take 1 tablet (100 mg total) by mouth daily as needed for Erectile Dysfunction., Disp: 4 tablet, Rfl: 4    tadalafil (CIALIS) 5 MG tablet, Take 1 tablet (5 mg total) by mouth daily as needed for Erectile Dysfunction., Disp: 30 tablet, Rfl: 6    methylPREDNISolone (MEDROL DOSEPACK) 4 mg tablet, use as directed, Disp: 1 Package, Rfl: 0      Objective:     Vitals:  Blood pressure 136/87, weight 80.3 kg (177 lb 1.6 oz).    Physical Examination:   GEN: wn/wd male in no apparent distress  SKIN: no rashes, no sclerodactyly, no Raynaud's, no periungual erythema, no digital tip ulcerations, no nailbed pitting  HEAD: no alopecia, no scalp tenderness, no temporal artery tenderness or induration.  EYES: no pallor, no icterus, PERRLA  ENT:  no thrush, no mucosal dryness or ulcerations, adequate oral hygiene & dentition.  NECK: supple x 6, no masses, no thyromegaly, no lymphadenopathy.  CV:   S1 and S2 regular, no murmurs, gallop or rubs  CHEST: Normal respiratory effort;  normal breath sounds/no adventitious sounds. No signs of consolidation.  ABD: non-tender and non-distended; soft; normal bowel sounds; no rebound/guarding or tenderness. No hepatosplenomegaly.  Musculoskeletal:  No evidence of active synovitis.  No progressive deformity  EXTREM: no clubbing, cyanosis or edema. normal pulses.  NEURO:  grossly intact; motor/sensory WNL; no tremors  PSYCH:  normal mood, affect and behavior    Labs:   Lab Results   Component Value Date    WBC 6.92 08/06/2019    HGB 13.6 (L) 08/06/2019     HCT 42.7 08/06/2019    MCV 94 08/06/2019     08/06/2019   CMP@  Sodium   Date Value Ref Range Status   08/06/2019 141 136 - 145 mmol/L Final   04/24/2019 139 134 - 144 mmol/L      Potassium   Date Value Ref Range Status   08/06/2019 4.8 3.5 - 5.1 mmol/L Final     Chloride   Date Value Ref Range Status   08/06/2019 105 95 - 110 mmol/L Final   04/24/2019 102 98 - 110 mmol/L      CO2   Date Value Ref Range Status   08/06/2019 30 (H) 23 - 29 mmol/L Final     Glucose   Date Value Ref Range Status   08/06/2019 103 70 - 110 mg/dL Final   04/24/2019 85 70 - 99 mg/dL      BUN, Bld   Date Value Ref Range Status   08/06/2019 12 8 - 23 mg/dL Final     Creatinine   Date Value Ref Range Status   08/06/2019 0.9 0.5 - 1.4 mg/dL Final   04/24/2019 0.82 0.60 - 1.40 mg/dL      Calcium   Date Value Ref Range Status   08/06/2019 9.6 8.7 - 10.5 mg/dL Final     Total Protein   Date Value Ref Range Status   08/06/2019 7.3 6.0 - 8.4 g/dL Final     Albumin   Date Value Ref Range Status   08/06/2019 4.2 3.5 - 5.2 g/dL Final   04/24/2019 4.1 3.1 - 4.7 g/dL      Total Bilirubin   Date Value Ref Range Status   08/06/2019 0.9 0.1 - 1.0 mg/dL Final     Comment:     For infants and newborns, interpretation of results should be based  on gestational age, weight and in agreement with clinical  observations.  Premature Infant recommended reference ranges:  Up to 24 hours.............<8.0 mg/dL  Up to 48 hours............<12.0 mg/dL  3-5 days..................<15.0 mg/dL  6-29 days.................<15.0 mg/dL       Alkaline Phosphatase   Date Value Ref Range Status   08/06/2019 43 (L) 55 - 135 U/L Final     AST   Date Value Ref Range Status   08/06/2019 18 10 - 40 U/L Final     ALT   Date Value Ref Range Status   08/06/2019 24 10 - 44 U/L Final     CRP   Date Value Ref Range Status   08/06/2019 0.03 0.00 - 0.75 mg/dL Final     Rheumatoid Factor   Date Value Ref Range Status   09/26/2017 <10.0 0.0 - 13.9 IU/mL      Comment:     Performed at:  [facility] [id number] [place], [place], [id number] Ragland MD, Phone:  [phone]       Radiology/Diagnostic Studies:    None    Assessment/Discussion/Plan:   69 y.o. male with rheumatoid arthritis-good control on methotrexate 12.5 mg weekly and prednisone 2.5 mg twice daily    PLAN:  I will continue his medication without change.  Routine blood testing was ordered.    RTC:  I will see him back in 4 months.    Electronically signed by Calderon Barrett MD

## 2019-12-09 DIAGNOSIS — M19.90 CHRONIC INFLAMMATORY ARTHRITIS: ICD-10-CM

## 2019-12-09 RX ORDER — PREDNISONE 2.5 MG/1
TABLET ORAL
Qty: 60 TABLET | Refills: 5 | Status: SHIPPED | OUTPATIENT
Start: 2019-12-09 | End: 2020-07-25 | Stop reason: SDUPTHER

## 2019-12-20 ENCOUNTER — TELEPHONE (OUTPATIENT)
Dept: FAMILY MEDICINE | Facility: CLINIC | Age: 69
End: 2019-12-20

## 2019-12-20 RX ORDER — AZITHROMYCIN 250 MG/1
TABLET, FILM COATED ORAL
Qty: 6 TABLET | Refills: 0 | Status: SHIPPED | OUTPATIENT
Start: 2019-12-20 | End: 2019-12-25

## 2019-12-20 RX ORDER — BENZONATATE 100 MG/1
100 CAPSULE ORAL 3 TIMES DAILY PRN
Qty: 30 CAPSULE | Refills: 0 | Status: SHIPPED | OUTPATIENT
Start: 2019-12-20 | End: 2019-12-30

## 2019-12-20 NOTE — TELEPHONE ENCOUNTER
Spoke with pt and let him know we sent in the medication per Sergio and would need to call back next week if no better.

## 2019-12-20 NOTE — TELEPHONE ENCOUNTER
I'll send him in a zpack and tessalon perles but if cough or sxs worsen will need to be evaluated.

## 2019-12-20 NOTE — TELEPHONE ENCOUNTER
----- Message from Adela Ramon sent at 12/20/2019 10:44 AM CST -----  Contact: nestor  Patient walked in to see if he could be seen. He has a headache ,fever,chest congestion, and a bad cough. We did not have any openings. Per Marco Antoino go to the Urgent care. He is asking for a antibiotic and something for his cough  can be called in. He does not want to go to urgent care. [facility] [address]. pts # [phone]

## 2019-12-23 ENCOUNTER — TELEPHONE (OUTPATIENT)
Dept: FAMILY MEDICINE | Facility: CLINIC | Age: 69
End: 2019-12-23

## 2019-12-23 ENCOUNTER — OFFICE VISIT (OUTPATIENT)
Dept: FAMILY MEDICINE | Facility: CLINIC | Age: 69
End: 2019-12-23
Payer: MEDICARE

## 2019-12-23 ENCOUNTER — HOSPITAL ENCOUNTER (OUTPATIENT)
Dept: RADIOLOGY | Facility: HOSPITAL | Age: 69
Discharge: HOME | End: 2019-12-23
Attending: PHYSICIAN ASSISTANT
Payer: MEDICARE

## 2019-12-23 VITALS
BODY MASS INDEX: 26.55 KG/M2 | WEIGHT: 169.19 LBS | SYSTOLIC BLOOD PRESSURE: 100 MMHG | HEART RATE: 80 BPM | DIASTOLIC BLOOD PRESSURE: 70 MMHG | TEMPERATURE: 98 F | HEIGHT: 67 IN

## 2019-12-23 DIAGNOSIS — J40 BRONCHITIS: ICD-10-CM

## 2019-12-23 DIAGNOSIS — R68.89 FLU-LIKE SYMPTOMS: ICD-10-CM

## 2019-12-23 DIAGNOSIS — J40 BRONCHITIS: Primary | ICD-10-CM

## 2019-12-23 PROCEDURE — 99214 PR OFFICE/OUTPT VISIT, EST, LEVL IV, 30-39 MIN: ICD-10-PCS | Mod: S$GLB,,, | Performed by: PHYSICIAN ASSISTANT

## 2019-12-23 PROCEDURE — 1101F PR PT FALLS ASSESS DOC 0-1 FALLS W/OUT INJ PAST YR: ICD-10-PCS | Mod: S$GLB,,, | Performed by: PHYSICIAN ASSISTANT

## 2019-12-23 PROCEDURE — 1159F PR MEDICATION LIST DOCUMENTED IN MEDICAL RECORD: ICD-10-PCS | Mod: S$GLB,,, | Performed by: PHYSICIAN ASSISTANT

## 2019-12-23 PROCEDURE — 71046 X-RAY EXAM CHEST 2 VIEWS: CPT | Mod: TC,PO

## 2019-12-23 PROCEDURE — 1159F MED LIST DOCD IN RCRD: CPT | Mod: S$GLB,,, | Performed by: PHYSICIAN ASSISTANT

## 2019-12-23 PROCEDURE — 99214 OFFICE O/P EST MOD 30 MIN: CPT | Mod: S$GLB,,, | Performed by: PHYSICIAN ASSISTANT

## 2019-12-23 PROCEDURE — 1101F PT FALLS ASSESS-DOCD LE1/YR: CPT | Mod: S$GLB,,, | Performed by: PHYSICIAN ASSISTANT

## 2019-12-23 RX ORDER — CODEINE PHOSPHATE AND GUAIFENESIN 10; 100 MG/5ML; MG/5ML
5 SOLUTION ORAL 3 TIMES DAILY PRN
Qty: 118 ML | Refills: 0 | Status: SHIPPED | OUTPATIENT
Start: 2019-12-23 | End: 2020-01-02

## 2019-12-23 NOTE — PROGRESS NOTES
SUBJECTIVE:    Patient ID: Kyrie Norton Jr. is a 69 y.o. male.    Chief Complaint: Influenza (Pt believes he may have the flu.....her reports cough, congestion, chills, fever and body aches.....mlr)    69-year-old white male presents for urgent visit.  He reports flu-like symptoms for the past couple days. But sxs overall have been present for a couple weeks.  Cough, congestion, chills, fever, body aches to name a few.  He has just been taken over-the-counter medication and we started him on a Z-David on Friday.  He does report improvement in his symptoms overall but cough persistent and nonproductive.      Office Visit on 09/10/2019   Component Date Value Ref Range Status    Hemoglobin A1C 09/10/2019 6.3  % Final   Lab Visit on 08/06/2019   Component Date Value Ref Range Status    WBC 08/06/2019 6.92  3.90 - 12.70 K/uL Final    RBC 08/06/2019 4.56* 4.60 - 6.20 M/uL Final    Hemoglobin 08/06/2019 13.6* 14.0 - 18.0 g/dL Final    Hematocrit 08/06/2019 42.7  40.0 - 54.0 % Final    Mean Corpuscular Volume 08/06/2019 94  82 - 98 fL Final    Mean Corpuscular Hemoglobin 08/06/2019 29.8  27.0 - 31.0 pg Final    Mean Corpuscular Hemoglobin Conc 08/06/2019 31.9* 32.0 - 36.0 g/dL Final    RDW 08/06/2019 13.2  11.5 - 14.5 % Final    Platelets 08/06/2019 251  150 - 350 K/uL Final    MPV 08/06/2019 10.5  9.2 - 12.9 fL Final    Immature Granulocytes 08/06/2019 0.4  0.0 - 0.5 % Final    Gran # (ANC) 08/06/2019 4.3  1.8 - 7.7 K/uL Final    Immature Grans (Abs) 08/06/2019 0.03  0.00 - 0.04 K/uL Final    Lymph # 08/06/2019 1.9  1.0 - 4.8 K/uL Final    Mono # 08/06/2019 0.6  0.3 - 1.0 K/uL Final    Eos # 08/06/2019 0.1  0.0 - 0.5 K/uL Final    Baso # 08/06/2019 0.02  0.00 - 0.20 K/uL Final    nRBC 08/06/2019 0  0 /100 WBC Final    Gran% 08/06/2019 61.4  38.0 - 73.0 % Final    Lymph% 08/06/2019 27.6  18.0 - 48.0 % Final    Mono% 08/06/2019 8.4  4.0 - 15.0 % Final    Eosinophil% 08/06/2019 1.9  0.0 - 8.0 % Final     Basophil% 08/06/2019 0.3  0.0 - 1.9 % Final    Differential Method 08/06/2019 Automated   Final    Sodium 08/06/2019 141  136 - 145 mmol/L Final    Potassium 08/06/2019 4.8  3.5 - 5.1 mmol/L Final    Chloride 08/06/2019 105  95 - 110 mmol/L Final    CO2 08/06/2019 30* 23 - 29 mmol/L Final    Glucose 08/06/2019 103  70 - 110 mg/dL Final    BUN, Bld 08/06/2019 12  8 - 23 mg/dL Final    Creatinine 08/06/2019 0.9  0.5 - 1.4 mg/dL Final    Calcium 08/06/2019 9.6  8.7 - 10.5 mg/dL Final    Total Protein 08/06/2019 7.3  6.0 - 8.4 g/dL Final    Albumin 08/06/2019 4.2  3.5 - 5.2 g/dL Final    Total Bilirubin 08/06/2019 0.9  0.1 - 1.0 mg/dL Final    Alkaline Phosphatase 08/06/2019 43* 55 - 135 U/L Final    AST 08/06/2019 18  10 - 40 U/L Final    ALT 08/06/2019 24  10 - 44 U/L Final    Anion Gap 08/06/2019 6* 8 - 16 mmol/L Final    eGFR if African American 08/06/2019 >60.0  >60 mL/min/1.73 m^2 Final    eGFR if non African American 08/06/2019 >60.0  >60 mL/min/1.73 m^2 Final    CRP 08/06/2019 0.03  0.00 - 0.75 mg/dL Final       Past Medical History:   Diagnosis Date    Diabetes mellitus type II     Hyperlipidemia     Hypertension      History reviewed. No pertinent surgical history.  Family History   Problem Relation Age of Onset    Blindness Neg Hx     Glaucoma Neg Hx     Macular degeneration Neg Hx        Marital Status:   Alcohol History:  reports that he drinks alcohol.  Tobacco History:  reports that he has never smoked. He has never used smokeless tobacco.  Drug History:  reports that he does not use drugs.    Review of patient's allergies indicates:   Allergen Reactions    Parafon forte Nausea Only       Current Outpatient Medications:     aspirin (ECOTRIN) 81 MG EC tablet, Take 81 mg by mouth once daily., Disp: , Rfl:     atorvastatin (LIPITOR) 40 MG tablet, Take 1 tablet (40 mg total) by mouth once daily., Disp: 90 tablet, Rfl: 4    azithromycin (Z-MAURA) 250 MG tablet, Take 2  tablets by mouth on day 1; Take 1 tablet by mouth on days 2-5, Disp: 6 tablet, Rfl: 0    benzonatate (TESSALON) 100 MG capsule, Take 1 capsule (100 mg total) by mouth 3 (three) times daily as needed for Cough., Disp: 30 capsule, Rfl: 0    blood sugar diagnostic (ACCU-CHEK CJ PLUS TEST STRP) Strp, 1 strip by Subdermal route Daily., Disp: 100 strip, Rfl: 4    coenzyme Q10 (CO Q-10) 10 mg capsule, Take 10 mg by mouth once daily., Disp: , Rfl:     fish oil-omega-3 fatty acids 300-1,000 mg capsule, Take 2 g by mouth once daily., Disp: , Rfl:     folic acid (FOLVITE) 1 MG tablet, TAKE 1 TABLET BY MOUTH DAILY, Disp: 90 tablet, Rfl: 3    glipizide-metformin (METAGLIP) 5-500 mg per tablet, 1 tablet in the am, 2 tablets in the pm., Disp: 270 tablet, Rfl: 1    guaifenesin-codeine 100-10 mg/5 ml (TUSSI-ORGANIDIN NR)  mg/5 mL syrup, Take 5 mLs by mouth 3 (three) times daily as needed for Cough., Disp: 118 mL, Rfl: 0    hydroxychloroquine (PLAQUENIL) 200 mg tablet, Take 1 tablet (200 mg total) by mouth every 12 (twelve) hours., Disp: 90 tablet, Rfl: 1    ibuprofen (ADVIL,MOTRIN) 800 MG tablet, Take 1 tablet (800 mg total) by mouth every 6 (six) hours as needed for Pain., Disp: 20 tablet, Rfl: 0    lancets (ACCU-CHEK FASTCLIX) Misc, 1 Device by Misc.(Non-Drug; Combo Route) route Daily., Disp: 100 each, Rfl: 4    methotrexate 2.5 MG Tab, TAKE 5 TABLETS BY MOUTH EVERY 7 DAYS, Disp: 60 tablet, Rfl: 1    milk thistle 175 mg tablet, Take 175 mg by mouth once daily., Disp: , Rfl:     predniSONE (DELTASONE) 2.5 MG tablet, TAKE 1 TABLET(2.5 MG) BY MOUTH TWICE DAILY, Disp: 60 tablet, Rfl: 5    sildenafil (VIAGRA) 100 MG tablet, Take 1 tablet (100 mg total) by mouth daily as needed for Erectile Dysfunction., Disp: 4 tablet, Rfl: 4    tadalafil (CIALIS) 5 MG tablet, Take 1 tablet (5 mg total) by mouth daily as needed for Erectile Dysfunction., Disp: 30 tablet, Rfl: 6    Review of Systems   Constitutional: Positive for  "chills, fatigue and fever.   HENT: Positive for congestion. Negative for ear discharge, ear pain, rhinorrhea, sinus pressure, sinus pain, sneezing, sore throat and trouble swallowing.    Eyes: Negative for pain, discharge, redness and itching.   Respiratory: Positive for cough. Negative for chest tightness and shortness of breath.    Cardiovascular: Negative for chest pain.   Gastrointestinal: Negative for abdominal distention and abdominal pain.   Neurological: Negative for weakness and headaches.          Objective:      Vitals:    12/23/19 0744   BP: 100/70   Pulse: 80   Temp: 97.8 °F (36.6 °C)   TempSrc: Oral   Weight: 76.7 kg (169 lb 3.2 oz)   Height: 5' 7" (1.702 m)     Physical Exam   Constitutional: He appears well-developed and well-nourished. No distress.   HENT:   Head: Normocephalic and atraumatic.   Eyes: Pupils are equal, round, and reactive to light. Conjunctivae and EOM are normal. Right eye exhibits no discharge. Left eye exhibits no discharge.   Neck: Normal range of motion. Neck supple. No thyromegaly present.   Cardiovascular: Normal rate, regular rhythm and normal heart sounds.   Pulmonary/Chest: Effort normal and breath sounds normal. No respiratory distress. He has no wheezes. He exhibits no tenderness.   Abdominal: Soft. Bowel sounds are normal.         Assessment:       1. Bronchitis    2. Flu-like symptoms         Plan:       Bronchitis  Comments:  Rapid flu test negative in clinic.  Will check chest x-ray today.  Upgrade cough medicine.  Rest and push fluids.  If symptoms persist or worsen let me know  Orders:  -     X-Ray Chest PA And Lateral; Future; Expected date: 12/23/2019    Flu-like symptoms  -     POCT Influenza A/B Molecular    Other orders  -     guaifenesin-codeine 100-10 mg/5 ml (TUSSI-ORGANIDIN NR)  mg/5 mL syrup; Take 5 mLs by mouth 3 (three) times daily as needed for Cough.  Dispense: 118 mL; Refill: 0      Follow up if symptoms worsen or fail to improve, for Pending " imaging.        12/23/2019 Marco Antonio Loyola PA-C

## 2019-12-23 NOTE — TELEPHONE ENCOUNTER
----- Message from Marco Antonio Loyola PA-C sent at 12/23/2019 12:41 PM CST -----  Chest x-ray is normal with no acute findings.  Since he told me his symptoms were improving this morning I want him to continue as is finish out the Z-David

## 2019-12-23 NOTE — PATIENT INSTRUCTIONS
Bronchitis, Antibiotic Treatment (Adult)    Bronchitis is an infection of the air passages (bronchial tubes) in your lungs. It often occurs when you have a cold. This illness is contagious during the first few days and is spread through the air by coughing and sneezing, or by direct contact (touching the sick person and then touching your own eyes, nose, or mouth).  Symptoms of bronchitis include cough with mucus (phlegm) and low-grade fever. Bronchitis usually lasts 7 to 14 days. Mild cases can be treated with simple home remedies. More severe infection is treated with an antibiotic.  Home care  Follow these guidelines when caring for yourself at home:  · If your symptoms are severe, rest at home for the first 2 to 3 days. When you go back to your usual activities, don't let yourself get too tired.  · Do not smoke. Also avoid being exposed to secondhand smoke.  · You may use over-the-counter medicines to control fever or pain, unless another medicine was prescribed. (Note: If you have chronic liver or kidney disease or have ever had a stomach ulcer or gastrointestinal bleeding, talk with your healthcare provider before using these medicines. Also talk to your provider if you are taking medicine to prevent blood clots.) Aspirin should never be given to anyone younger than 18 years of age who is ill with a viral infection or fever. It may cause severe liver or brain damage.  · Your appetite may be poor, so a light diet is fine. Avoid dehydration by drinking 6 to 8 glasses of fluids per day (such as water, soft drinks, sports drinks, juices, tea, or soup). Extra fluids will help loosen secretions in the nose and lungs.  · Over-the-counter cough, cold, and sore-throat medicines will not shorten the length of the illness, but they may be helpful to reduce symptoms. (Note: Do not use decongestants if you have high blood pressure.)  · Finish all antibiotic medicine. Do this even if you are feeling better after only a  few days.  Follow-up care  Follow up with your healthcare provider, or as advised. If you had an X-ray or ECG (electrocardiogram), a specialist will review it. You will be notified of any new findings that may affect your care.  Note: If you are age 65 or older, or if you have a chronic lung disease or condition that affects your immune system, or you smoke, talk to your healthcare provider about having pneumococcal vaccinations and a yearly influenza vaccination (flu shot).  When to seek medical advice  Call your healthcare provider right away if any of these occur:  · Fever of 100.4°F (38°C) or higher  · Coughing up increased amounts of colored sputum  · Weakness, drowsiness, headache, facial pain, ear pain, or a stiff neck  Call 911, or get immediate medical care  Contact emergency services right away if any of these occur.  · Coughing up blood  · Worsening weakness, drowsiness, headache, or stiff neck  · Trouble breathing, wheezing, or pain with breathing  Date Last Reviewed: 9/13/2015  © 7340-8381 [employer], [employer]. [address] [place] [address], [place] [address]. All rights reserved. This information is not intended as a substitute for professional medical care. Always follow your healthcare professional's instructions.

## 2019-12-30 DIAGNOSIS — M05.79 RHEUMATOID ARTHRITIS INVOLVING MULTIPLE SITES WITH POSITIVE RHEUMATOID FACTOR: ICD-10-CM

## 2019-12-30 RX ORDER — METHOTREXATE 2.5 MG/1
TABLET ORAL
Qty: 60 TABLET | Refills: 1 | Status: SHIPPED | OUTPATIENT
Start: 2019-12-30 | End: 2020-02-10 | Stop reason: SDUPTHER

## 2020-01-20 ENCOUNTER — TELEPHONE (OUTPATIENT)
Dept: FAMILY MEDICINE | Facility: CLINIC | Age: 70
End: 2020-01-20

## 2020-01-20 NOTE — TELEPHONE ENCOUNTER
----- Message from Melinda Perdomo sent at 1/20/2020 10:13 AM CST -----  Contact: Patient Walk In   @ 10:14 pharmacy change [facility] [address] [place] [address] for all future fills.    # [phone]

## 2020-02-02 DIAGNOSIS — M19.90 CHRONIC INFLAMMATORY ARTHRITIS: ICD-10-CM

## 2020-02-03 RX ORDER — HYDROXYCHLOROQUINE SULFATE 200 MG/1
TABLET, FILM COATED ORAL
Qty: 60 TABLET | Refills: 5 | Status: SHIPPED | OUTPATIENT
Start: 2020-02-03 | End: 2020-08-31

## 2020-02-04 ENCOUNTER — TELEPHONE (OUTPATIENT)
Dept: FAMILY MEDICINE | Facility: CLINIC | Age: 70
End: 2020-02-04

## 2020-02-04 NOTE — TELEPHONE ENCOUNTER
----- Message from Melinda Perdomo sent at 2/4/2020  9:55 AM CST -----  Contact: Patient Walk In  @ 9:56 pt requesting refills on all medications.  Pt not longers using  [facility] [address] requesting refills be sent here in the future.    CB # [phone] or [phone]

## 2020-02-08 DIAGNOSIS — Z79.631 METHOTREXATE, LONG TERM, CURRENT USE: ICD-10-CM

## 2020-02-08 RX ORDER — FOLIC ACID 1 MG/1
TABLET ORAL
Qty: 90 TABLET | Refills: 3 | Status: SHIPPED | OUTPATIENT
Start: 2020-02-08 | End: 2021-02-15

## 2020-02-10 DIAGNOSIS — M05.79 RHEUMATOID ARTHRITIS INVOLVING MULTIPLE SITES WITH POSITIVE RHEUMATOID FACTOR: ICD-10-CM

## 2020-02-10 RX ORDER — METHOTREXATE 2.5 MG/1
TABLET ORAL
Qty: 60 TABLET | Refills: 1 | Status: SHIPPED | OUTPATIENT
Start: 2020-02-10 | End: 2020-08-05 | Stop reason: SDUPTHER

## 2020-03-07 LAB
ALBUMIN SERPL-MCNC: 4.7 G/DL (ref 3.6–5.1)
ALBUMIN/CREAT UR: 3 MCG/MG CREAT
ALBUMIN/GLOB SERPL: 1.9 (CALC) (ref 1–2.5)
ALP SERPL-CCNC: 51 U/L (ref 35–144)
ALT SERPL-CCNC: 25 U/L (ref 9–46)
APPEARANCE UR: CLEAR
AST SERPL-CCNC: 17 U/L (ref 10–35)
BACTERIA #/AREA URNS HPF: NORMAL /HPF
BACTERIA UR CULT: NORMAL
BASOPHILS # BLD AUTO: 17 CELLS/UL (ref 0–200)
BASOPHILS NFR BLD AUTO: 0.3 %
BILIRUB SERPL-MCNC: 0.9 MG/DL (ref 0.2–1.2)
BILIRUB UR QL STRIP: NEGATIVE
BUN SERPL-MCNC: 12 MG/DL (ref 7–25)
BUN/CREAT SERPL: ABNORMAL (CALC) (ref 6–22)
CALCIUM SERPL-MCNC: 10.1 MG/DL (ref 8.6–10.3)
CHLORIDE SERPL-SCNC: 101 MMOL/L (ref 98–110)
CHOLEST SERPL-MCNC: 156 MG/DL
CHOLEST/HDLC SERPL: 3 (CALC)
CO2 SERPL-SCNC: 33 MMOL/L (ref 20–32)
COLOR UR: YELLOW
CREAT SERPL-MCNC: 0.92 MG/DL (ref 0.7–1.25)
CREAT UR-MCNC: 132 MG/DL (ref 20–320)
EOSINOPHIL # BLD AUTO: 131 CELLS/UL (ref 15–500)
EOSINOPHIL NFR BLD AUTO: 2.3 %
ERYTHROCYTE [DISTWIDTH] IN BLOOD BY AUTOMATED COUNT: 13.8 % (ref 11–15)
GFRSERPLBLD MDRD-ARVRAT: 85 ML/MIN/1.73M2
GLOBULIN SER CALC-MCNC: 2.5 G/DL (CALC) (ref 1.9–3.7)
GLUCOSE SERPL-MCNC: 96 MG/DL (ref 65–99)
GLUCOSE UR QL STRIP: NEGATIVE
HBA1C MFR BLD: 6.3 % OF TOTAL HGB
HCT VFR BLD AUTO: 42.8 % (ref 38.5–50)
HDLC SERPL-MCNC: 52 MG/DL
HGB BLD-MCNC: 14.6 G/DL (ref 13.2–17.1)
HGB UR QL STRIP: NEGATIVE
HYALINE CASTS #/AREA URNS LPF: NORMAL /LPF
KETONES UR QL STRIP: NEGATIVE
LDLC SERPL CALC-MCNC: 82 MG/DL (CALC)
LEUKOCYTE ESTERASE UR QL STRIP: NEGATIVE
LYMPHOCYTES # BLD AUTO: 2212 CELLS/UL (ref 850–3900)
LYMPHOCYTES NFR BLD AUTO: 38.8 %
MCH RBC QN AUTO: 30.4 PG (ref 27–33)
MCHC RBC AUTO-ENTMCNC: 34.1 G/DL (ref 32–36)
MCV RBC AUTO: 89 FL (ref 80–100)
MICROALBUMIN UR-MCNC: 0.4 MG/DL
MONOCYTES # BLD AUTO: 467 CELLS/UL (ref 200–950)
MONOCYTES NFR BLD AUTO: 8.2 %
NEUTROPHILS # BLD AUTO: 2873 CELLS/UL (ref 1500–7800)
NEUTROPHILS NFR BLD AUTO: 50.4 %
NITRITE UR QL STRIP: NEGATIVE
NONHDLC SERPL-MCNC: 104 MG/DL (CALC)
PH UR STRIP: NORMAL [PH] (ref 5–8)
PLATELET # BLD AUTO: 253 THOUSAND/UL (ref 140–400)
PMV BLD REES-ECKER: 10.4 FL (ref 7.5–12.5)
POTASSIUM SERPL-SCNC: 5.3 MMOL/L (ref 3.5–5.3)
PROT SERPL-MCNC: 7.2 G/DL (ref 6.1–8.1)
PROT UR QL STRIP: NEGATIVE
RBC # BLD AUTO: 4.81 MILLION/UL (ref 4.2–5.8)
RBC #/AREA URNS HPF: NORMAL /HPF
SODIUM SERPL-SCNC: 142 MMOL/L (ref 135–146)
SP GR UR STRIP: 1.02 (ref 1–1.03)
SQUAMOUS #/AREA URNS HPF: NORMAL /HPF
TRIGL SERPL-MCNC: 128 MG/DL
TSH SERPL-ACNC: 2.01 MIU/L (ref 0.4–4.5)
WBC # BLD AUTO: 5.7 THOUSAND/UL (ref 3.8–10.8)
WBC #/AREA URNS HPF: NORMAL /HPF

## 2020-03-10 ENCOUNTER — OFFICE VISIT (OUTPATIENT)
Dept: FAMILY MEDICINE | Facility: CLINIC | Age: 70
End: 2020-03-10
Payer: MEDICARE

## 2020-03-10 VITALS
HEIGHT: 67 IN | BODY MASS INDEX: 26.47 KG/M2 | DIASTOLIC BLOOD PRESSURE: 68 MMHG | HEART RATE: 76 BPM | SYSTOLIC BLOOD PRESSURE: 132 MMHG | WEIGHT: 168.63 LBS

## 2020-03-10 DIAGNOSIS — N52.9 ERECTILE DYSFUNCTION, UNSPECIFIED ERECTILE DYSFUNCTION TYPE: ICD-10-CM

## 2020-03-10 DIAGNOSIS — E11.69 HYPERLIPIDEMIA ASSOCIATED WITH TYPE 2 DIABETES MELLITUS: Primary | ICD-10-CM

## 2020-03-10 DIAGNOSIS — E78.5 HYPERLIPIDEMIA ASSOCIATED WITH TYPE 2 DIABETES MELLITUS: Primary | ICD-10-CM

## 2020-03-10 DIAGNOSIS — M06.9 RHEUMATOID ARTHRITIS, INVOLVING UNSPECIFIED SITE, UNSPECIFIED RHEUMATOID FACTOR PRESENCE: ICD-10-CM

## 2020-03-10 PROCEDURE — 1101F PR PT FALLS ASSESS DOC 0-1 FALLS W/OUT INJ PAST YR: ICD-10-PCS | Mod: S$GLB,,, | Performed by: PHYSICIAN ASSISTANT

## 2020-03-10 PROCEDURE — 3044F HG A1C LEVEL LT 7.0%: CPT | Mod: S$GLB,,, | Performed by: PHYSICIAN ASSISTANT

## 2020-03-10 PROCEDURE — 1101F PT FALLS ASSESS-DOCD LE1/YR: CPT | Mod: S$GLB,,, | Performed by: PHYSICIAN ASSISTANT

## 2020-03-10 PROCEDURE — 99214 OFFICE O/P EST MOD 30 MIN: CPT | Mod: S$GLB,,, | Performed by: PHYSICIAN ASSISTANT

## 2020-03-10 PROCEDURE — 3044F PR MOST RECENT HEMOGLOBIN A1C LEVEL <7.0%: ICD-10-PCS | Mod: S$GLB,,, | Performed by: PHYSICIAN ASSISTANT

## 2020-03-10 PROCEDURE — 1159F MED LIST DOCD IN RCRD: CPT | Mod: S$GLB,,, | Performed by: PHYSICIAN ASSISTANT

## 2020-03-10 PROCEDURE — 1126F PR PAIN SEVERITY QUANTIFIED, NO PAIN PRESENT: ICD-10-PCS | Mod: S$GLB,,, | Performed by: PHYSICIAN ASSISTANT

## 2020-03-10 PROCEDURE — 99214 PR OFFICE/OUTPT VISIT, EST, LEVL IV, 30-39 MIN: ICD-10-PCS | Mod: S$GLB,,, | Performed by: PHYSICIAN ASSISTANT

## 2020-03-10 PROCEDURE — 1126F AMNT PAIN NOTED NONE PRSNT: CPT | Mod: S$GLB,,, | Performed by: PHYSICIAN ASSISTANT

## 2020-03-10 PROCEDURE — 1159F PR MEDICATION LIST DOCUMENTED IN MEDICAL RECORD: ICD-10-PCS | Mod: S$GLB,,, | Performed by: PHYSICIAN ASSISTANT

## 2020-03-10 NOTE — PATIENT INSTRUCTIONS
Evaluating Erectile Dysfunction     Doctor talking to man.     Many men feel embarrassed to talk to a doctor about erectile dysfunction (ED). This common problem can be treated, but only if your doctor knows about it. Your doctor will likely ask you questions about your ED. Whether youre asked or not, tell your doctor anything that might help your doctor understand the problem. Your doctor may do an exam and may run some tests to help find the cause of your ED.  A simple exam  A medical exam may help your doctor understand what is causing your problem. ED is sometimes the first sign of some other health problem, so your doctor may check your overall health. He or she may also examine your penis, scrotum, and testicles. Tell your doctor about all of the medicines you take, including prescribed and over-the-counter medicines, as well as any herbs or supplements.  You may have some tests  Your doctor may recommend some or all of these tests:  · Blood tests measure your levels of hormones or lipids (fatty substances in the blood, including cholesterol). Other tests check for diabetes or help show the health of your liver, kidneys, and prostate.  · Blood flow tests check how well blood moves through your penis.  · A rectal exam checks for an enlarged prostate gland. An enlarged prostate and ED have been linked in recent studies.  · Additional tests check for other conditions that limit your ability to have intercourse.  Your treatment plan  Based on what you say and what any exam shows, your doctor will recommend a treatment plan. The first step may be to try ED medicines, since they help most men. If they dont help you, your doctor can suggest other kinds of treatment. You and your partner may also want to discuss which options would work best in your relationship. Treatment may include addressing the cause of health problems, such as lowering your cholesterol. And counseling may be recommended to talk about  underlying emotional issues.  Date Last Reviewed: 1/1/2017  © 2783-9713 [employer], [employer]. [address] [place] [address], [place] [address]. All rights reserved. This information is not intended as a substitute for professional medical care. Always follow your healthcare professional's instructions.

## 2020-03-10 NOTE — PROGRESS NOTES
SUBJECTIVE:    Patient ID: Kyrie Norton Jr. is a 70 y.o. male.    Chief Complaint: Diabetes (6 month follow up.....mr) and Medication Refill (brought bottles.....mlr)    70-year-old male presents for he is treated for diabetes and well managed with current A1c at 6.3%.  Thyroid, cholesterol, kidneys and liver all within normal limits on recent blood work. He reports that he has been doing well medically. No new concerns.      Orders Only on 03/05/2020   Component Date Value Ref Range Status    Cholesterol 03/05/2020 156  <200 mg/dL Final    HDL 03/05/2020 52  > OR = 40 mg/dL Final    Triglycerides 03/05/2020 128  <150 mg/dL Final    LDL Cholesterol 03/05/2020 82  mg/dL (calc) Final    Hdl/Cholesterol Ratio 03/05/2020 3.0  <5.0 (calc) Final    Non HDL Chol. (LDL+VLDL) 03/05/2020 104  <130 mg/dL (calc) Final    Creatinine, Random Ur 03/05/2020 132  20 - 320 mg/dL Final    Microalb, Ur 03/05/2020 0.4  See Note: mg/dL Final    Microalb Creat Ratio 03/05/2020 3  <30 mcg/mg creat Final    Glucose 03/05/2020 96  65 - 99 mg/dL Final    BUN, Bld 03/05/2020 12  7 - 25 mg/dL Final    Creatinine 03/05/2020 0.92  0.70 - 1.25 mg/dL Final    eGFR if non African American 03/05/2020 85  > OR = 60 mL/min/1.73m2 Final    eGFR if African American 03/05/2020 99  > OR = 60 mL/min/1.73m2 Final    BUN/Creatinine Ratio 03/05/2020 NOT APPLICABLE  6 - 22 (calc) Final    Sodium 03/05/2020 142  135 - 146 mmol/L Final    Potassium 03/05/2020 5.3  3.5 - 5.3 mmol/L Final    Chloride 03/05/2020 101  98 - 110 mmol/L Final    CO2 03/05/2020 33* 20 - 32 mmol/L Final    Calcium 03/05/2020 10.1  8.6 - 10.3 mg/dL Final    Total Protein 03/05/2020 7.2  6.1 - 8.1 g/dL Final    Albumin 03/05/2020 4.7  3.6 - 5.1 g/dL Final    Globulin, Total 03/05/2020 2.5  1.9 - 3.7 g/dL (calc) Final    Albumin/Globulin Ratio 03/05/2020 1.9  1.0 - 2.5 (calc) Final    Total Bilirubin 03/05/2020 0.9  0.2 - 1.2 mg/dL Final    Alkaline Phosphatase  03/05/2020 51  35 - 144 U/L Final    AST 03/05/2020 17  10 - 35 U/L Final    ALT 03/05/2020 25  9 - 46 U/L Final    Color, UA 03/05/2020 YELLOW  YELLOW Final    Appearance, UA 03/05/2020 CLEAR  CLEAR Final    Specific Gravity, UA 03/05/2020 1.018  1.001 - 1.035 Final    pH, UA 03/05/2020 < OR = 5.0  5.0 - 8.0 Final    Glucose, UA 03/05/2020 NEGATIVE  NEGATIVE Final    Bilirubin, UA 03/05/2020 NEGATIVE  NEGATIVE Final    Ketones, UA 03/05/2020 NEGATIVE  NEGATIVE Final    Occult Blood UA 03/05/2020 NEGATIVE  NEGATIVE Final    Protein, UA 03/05/2020 NEGATIVE  NEGATIVE Final    Nitrite, UA 03/05/2020 NEGATIVE  NEGATIVE Final    Leukocytes, UA 03/05/2020 NEGATIVE  NEGATIVE Final    WBC Casts, UA 03/05/2020 NONE SEEN  < OR = 5 /HPF Final    RBC Casts, UA 03/05/2020 NONE SEEN  < OR = 2 /HPF Final    Squam Epithel, UA 03/05/2020 NONE SEEN  < OR = 5 /HPF Final    Bacteria, UA 03/05/2020 NONE SEEN  NONE SEEN /HPF Final    Hyaline Casts, UA 03/05/2020 NONE SEEN  NONE SEEN /LPF Final    Reflexive Urine Culture 03/05/2020 NO CULTURE INDICATED   Final    WBC 03/05/2020 5.7  3.8 - 10.8 Thousand/uL Final    RBC 03/05/2020 4.81  4.20 - 5.80 Million/uL Final    Hemoglobin 03/05/2020 14.6  13.2 - 17.1 g/dL Final    Hematocrit 03/05/2020 42.8  38.5 - 50.0 % Final    Mean Corpuscular Volume 03/05/2020 89.0  80.0 - 100.0 fL Final    Mean Corpuscular Hemoglobin 03/05/2020 30.4  27.0 - 33.0 pg Final    Mean Corpuscular Hemoglobin Conc 03/05/2020 34.1  32.0 - 36.0 g/dL Final    RDW 03/05/2020 13.8  11.0 - 15.0 % Final    Platelets 03/05/2020 253  140 - 400 Thousand/uL Final    MPV 03/05/2020 10.4  7.5 - 12.5 fL Final    Neutrophils Absolute 03/05/2020 2,873  1,500 - 7,800 cells/uL Final    Lymph # 03/05/2020 2,212  850 - 3,900 cells/uL Final    Mono # 03/05/2020 467  200 - 950 cells/uL Final    Eos # 03/05/2020 131  15 - 500 cells/uL Final    Baso # 03/05/2020 17  0 - 200 cells/uL Final    Neutrophils  Relative 03/05/2020 50.4  % Final    Lymph% 03/05/2020 38.8  % Final    Mono% 03/05/2020 8.2  % Final    Eosinophil% 03/05/2020 2.3  % Final    Basophil% 03/05/2020 0.3  % Final    TSH 03/05/2020 2.01  0.40 - 4.50 mIU/L Final    Hemoglobin A1C 03/05/2020 6.3* <5.7 % of total Hgb Final       Past Medical History:   Diagnosis Date    Diabetes mellitus type II     Hyperlipidemia     Hypertension      History reviewed. No pertinent surgical history.  Family History   Problem Relation Age of Onset    Blindness Neg Hx     Glaucoma Neg Hx     Macular degeneration Neg Hx        Marital Status:   Alcohol History:  reports that he drinks alcohol.  Tobacco History:  reports that he has never smoked. He has never used smokeless tobacco.  Drug History:  reports that he does not use drugs.    Review of patient's allergies indicates:   Allergen Reactions    Parafon forte Nausea Only       Current Outpatient Medications:     aspirin (ECOTRIN) 81 MG EC tablet, Take 81 mg by mouth once daily., Disp: , Rfl:     atorvastatin (LIPITOR) 40 MG tablet, Take 1 tablet (40 mg total) by mouth once daily., Disp: 90 tablet, Rfl: 4    blood sugar diagnostic (ACCU-CHEK CJ PLUS TEST STRP) Strp, 1 strip by Subdermal route Daily., Disp: 100 strip, Rfl: 4    coenzyme Q10 (CO Q-10) 10 mg capsule, Take 10 mg by mouth once daily., Disp: , Rfl:     fish oil-omega-3 fatty acids 300-1,000 mg capsule, Take 2 g by mouth once daily., Disp: , Rfl:     folic acid (FOLVITE) 1 MG tablet, TAKE 1 TABLET BY MOUTH EVERY DAY, Disp: 90 tablet, Rfl: 3    glipizide-metformin (METAGLIP) 5-500 mg per tablet, 1 tablet in the am, 2 tablets in the pm., Disp: 270 tablet, Rfl: 1    hydroxychloroquine (PLAQUENIL) 200 mg tablet, TAKE 1 TABLET BY MOUTH EVERY 12 HOURS, Disp: 60 tablet, Rfl: 5    lancets (ACCU-CHEK FASTCLIX) Misc, 1 Device by Misc.(Non-Drug; Combo Route) route Daily., Disp: 100 each, Rfl: 4    methotrexate 2.5 MG Tab, TAKE 5 TABLETS  "BY MOUTH EVERY 7 DAYS, Disp: 60 tablet, Rfl: 1    MILK THISTLE ORAL, Take 250 mg by mouth once daily. , Disp: , Rfl:     predniSONE (DELTASONE) 2.5 MG tablet, TAKE 1 TABLET(2.5 MG) BY MOUTH TWICE DAILY, Disp: 60 tablet, Rfl: 5    ibuprofen (ADVIL,MOTRIN) 800 MG tablet, Take 1 tablet (800 mg total) by mouth every 6 (six) hours as needed for Pain., Disp: 20 tablet, Rfl: 0    sildenafil (VIAGRA) 100 MG tablet, Take 1 tablet (100 mg total) by mouth daily as needed for Erectile Dysfunction., Disp: 4 tablet, Rfl: 4    tadalafil (CIALIS) 5 MG tablet, Take 1 tablet (5 mg total) by mouth daily as needed for Erectile Dysfunction., Disp: 30 tablet, Rfl: 6    Review of Systems   Constitutional: Negative for activity change, fatigue, fever and unexpected weight change.   HENT: Negative for congestion.    Respiratory: Negative for apnea, cough, chest tightness and shortness of breath.    Cardiovascular: Negative for chest pain and palpitations.   Gastrointestinal: Negative for abdominal distention and abdominal pain.   Genitourinary: Negative for difficulty urinating and dysuria.   Musculoskeletal: Negative for arthralgias and back pain.   Neurological: Negative for dizziness and weakness.          Objective:      Vitals:    03/10/20 0956   BP: 132/68   Pulse: 76   Weight: 76.5 kg (168 lb 9.6 oz)   Height: 5' 7" (1.702 m)     Physical Exam   Constitutional: He is oriented to person, place, and time. He appears well-developed and well-nourished. No distress.   HENT:   Head: Normocephalic and atraumatic.   Eyes: Pupils are equal, round, and reactive to light.   Neck: Normal range of motion. Neck supple. No thyromegaly present.   Cardiovascular: Normal rate, regular rhythm, normal heart sounds and intact distal pulses.   Pulmonary/Chest: Effort normal and breath sounds normal.   Abdominal: Soft. Bowel sounds are normal. He exhibits no distension. There is no tenderness.   Musculoskeletal: Normal range of motion.   Neurological: " He is alert and oriented to person, place, and time. No cranial nerve deficit.   Skin: Skin is warm and dry. No rash noted. No erythema.         Assessment:       1. Hyperlipidemia associated with type 2 diabetes mellitus    2. Rheumatoid arthritis, involving unspecified site, unspecified rheumatoid factor presence    3. Erectile dysfunction, unspecified erectile dysfunction type         Plan:       Hyperlipidemia associated with type 2 diabetes mellitus  Comments:  A1c well managed at 6.3%. NO changes to current regemin. continue as is    Rheumatoid arthritis, involving unspecified site, unspecified rheumatoid factor presence  Comments:  well managed with current plaquenil therapy. has visit coming up next month with Dr. Barrett.    Erectile dysfunction, unspecified erectile dysfunction type  Comments:  Worsening, not responsive to PDE-5 medication in the past. will check testsosterone levels now.  Orders:  -     Testosterone, Total, Males; Future; Expected date: 03/10/2020      Follow up in about 3 months (around 6/10/2020) for Diabetic Check-Up.        3/10/2020 Marco Antonio Loyola PA-C

## 2020-03-12 LAB — TESTOST SERPL-MCNC: 502 NG/DL (ref 250–827)

## 2020-03-13 ENCOUNTER — TELEPHONE (OUTPATIENT)
Dept: FAMILY MEDICINE | Facility: CLINIC | Age: 70
End: 2020-03-13

## 2020-03-13 NOTE — TELEPHONE ENCOUNTER
----- Message from Marco Antonio Loyola PA-C sent at 3/12/2020  4:23 PM CDT -----  Testosterone is well within normal limits at 502.  Since PDE 5 medication has not been effective and testosterone normal, I would recommend evaluation further with Urology.  If agreeable we can set him up for Dr. Domenico Laurent.

## 2020-03-13 NOTE — PROGRESS NOTES
Gave message to wife, Nichol. He will let us know if he wishes to have a referral to see Dr Laurent/red

## 2020-03-23 DIAGNOSIS — E11.9 TYPE 2 DIABETES MELLITUS WITHOUT COMPLICATION: ICD-10-CM

## 2020-03-23 NOTE — TELEPHONE ENCOUNTER
----- Message from Miriam Desai sent at 3/23/2020 10:41 AM CDT -----  Accu Mind-NRGk woo plus strips 100's   [facility] [address].      Thanks  Miriam

## 2020-03-24 ENCOUNTER — OFFICE VISIT (OUTPATIENT)
Dept: RHEUMATOLOGY | Facility: CLINIC | Age: 70
End: 2020-03-24
Payer: MEDICARE

## 2020-03-24 VITALS
DIASTOLIC BLOOD PRESSURE: 80 MMHG | BODY MASS INDEX: 26.45 KG/M2 | WEIGHT: 168.88 LBS | TEMPERATURE: 99 F | SYSTOLIC BLOOD PRESSURE: 142 MMHG

## 2020-03-24 DIAGNOSIS — Z79.899 ENCOUNTER FOR LONG-TERM (CURRENT) DRUG USE: ICD-10-CM

## 2020-03-24 DIAGNOSIS — M19.90 CHRONIC INFLAMMATORY ARTHRITIS: Primary | ICD-10-CM

## 2020-03-24 PROCEDURE — 1101F PR PT FALLS ASSESS DOC 0-1 FALLS W/OUT INJ PAST YR: ICD-10-PCS | Mod: S$GLB,,, | Performed by: INTERNAL MEDICINE

## 2020-03-24 PROCEDURE — 99213 PR OFFICE/OUTPT VISIT, EST, LEVL III, 20-29 MIN: ICD-10-PCS | Mod: S$GLB,,, | Performed by: INTERNAL MEDICINE

## 2020-03-24 PROCEDURE — 1159F MED LIST DOCD IN RCRD: CPT | Mod: S$GLB,,, | Performed by: INTERNAL MEDICINE

## 2020-03-24 PROCEDURE — 1101F PT FALLS ASSESS-DOCD LE1/YR: CPT | Mod: S$GLB,,, | Performed by: INTERNAL MEDICINE

## 2020-03-24 PROCEDURE — 1125F PR PAIN SEVERITY QUANTIFIED, PAIN PRESENT: ICD-10-PCS | Mod: S$GLB,,, | Performed by: INTERNAL MEDICINE

## 2020-03-24 PROCEDURE — 1125F AMNT PAIN NOTED PAIN PRSNT: CPT | Mod: S$GLB,,, | Performed by: INTERNAL MEDICINE

## 2020-03-24 PROCEDURE — 99213 OFFICE O/P EST LOW 20 MIN: CPT | Mod: S$GLB,,, | Performed by: INTERNAL MEDICINE

## 2020-03-24 PROCEDURE — 1159F PR MEDICATION LIST DOCUMENTED IN MEDICAL RECORD: ICD-10-PCS | Mod: S$GLB,,, | Performed by: INTERNAL MEDICINE

## 2020-03-24 NOTE — PROGRESS NOTES
[facility] RHEUMATOLOGY           Follow-up visit    Notes dictated to M*Modal. Please forgive any unintended errors.  Subjective:       Patient ID:   NAME: Kyrie Norton Jr. : 1950     70 y.o. male    Referring Doc: No ref. provider found  Other Physicians:    Chief Complaint:  Chronic inflammatory arthritis      HPI/Interval History:  The patient is doing great.  He has no complaints joint pain or swelling.  He has no morning stiffness.    ROS:   GEN:    no fever, night sweats or weight loss  SKIN:   no rashes, erythema, bruising, or swelling, no Raynauds, no photosensitivity  HEENT: no changes in vision, no mouth ulcers, no sicca symptoms, no scalp tenderness, no jaw claudication.  CV:      no CP, PND, JUAN, orthopnea, no palpitations  PULM: no SOB, cough, hemoptysis, sputum or pleuritic pain  GI:       no GERD, no dysphagia, no hematemesis, no abdominal pain, nausea, vomiting, constipation, diarrhea, melanotic stools, BRBPR  :      no hematuria, dysuria  NEURO: no paresthesias, headaches, acute visual disturbances  MUSCULOSKELETAL:  As above  PSYCH:   No increased insomnia, no increased anxiety, no increased depression    Past Medical/Surgical History:  Past Medical History:   Diagnosis Date    Diabetes mellitus type II     Hyperlipidemia     Hypertension      History reviewed. No pertinent surgical history.    Allergies:  Review of patient's allergies indicates:   Allergen Reactions    Parafon forte Nausea Only       Social/Family History:  Social History     Socioeconomic History    Marital status:      Spouse name: Not on file    Number of children: Not on file    Years of education: Not on file    Highest education level: Not on file   Occupational History    Not on file   Social Needs    Financial resource strain: Not on file    Food insecurity:     Worry: Not on file     Inability: Not on file    Transportation needs:     Medical: Not on file     Non-medical: Not on file    Tobacco Use    Smoking status: Never Smoker    Smokeless tobacco: Never Used   Substance and Sexual Activity    Alcohol use: Yes     Comment: occasional    Drug use: No    Sexual activity: Not on file   Lifestyle    Physical activity:     Days per week: Not on file     Minutes per session: Not on file    Stress: Not on file   Relationships    Social connections:     Talks on phone: Not on file     Gets together: Not on file     Attends Yazidism service: Not on file     Active member of club or organization: Not on file     Attends meetings of clubs or organizations: Not on file     Relationship status: Not on file   Other Topics Concern    Not on file   Social History Narrative    Not on file     Family History   Problem Relation Age of Onset    Blindness Neg Hx     Glaucoma Neg Hx     Macular degeneration Neg Hx      FAMILY HISTORY: negative for Connective Tissue Disease      Medications:    Current Outpatient Medications:     aspirin (ECOTRIN) 81 MG EC tablet, Take 81 mg by mouth once daily., Disp: , Rfl:     atorvastatin (LIPITOR) 40 MG tablet, Take 1 tablet (40 mg total) by mouth once daily., Disp: 90 tablet, Rfl: 4    blood sugar diagnostic (ACCU-CHEK CJ PLUS TEST STRP) Strp, 1 strip by Subdermal route Daily., Disp: 100 strip, Rfl: 4    coenzyme Q10 (CO Q-10) 10 mg capsule, Take 10 mg by mouth once daily., Disp: , Rfl:     fish oil-omega-3 fatty acids 300-1,000 mg capsule, Take 2 g by mouth once daily., Disp: , Rfl:     folic acid (FOLVITE) 1 MG tablet, TAKE 1 TABLET BY MOUTH EVERY DAY, Disp: 90 tablet, Rfl: 3    glipizide-metformin (METAGLIP) 5-500 mg per tablet, 1 tablet in the am, 2 tablets in the pm., Disp: 270 tablet, Rfl: 1    hydroxychloroquine (PLAQUENIL) 200 mg tablet, TAKE 1 TABLET BY MOUTH EVERY 12 HOURS, Disp: 60 tablet, Rfl: 5    ibuprofen (ADVIL,MOTRIN) 800 MG tablet, Take 1 tablet (800 mg total) by mouth every 6 (six) hours as needed for Pain., Disp: 20 tablet, Rfl:  0    lancets (ACCU-CHEK FASTCLIX) Misc, 1 Device by Misc.(Non-Drug; Combo Route) route Daily., Disp: 100 each, Rfl: 4    methotrexate 2.5 MG Tab, TAKE 5 TABLETS BY MOUTH EVERY 7 DAYS, Disp: 60 tablet, Rfl: 1    MILK THISTLE ORAL, Take 250 mg by mouth once daily. , Disp: , Rfl:     predniSONE (DELTASONE) 2.5 MG tablet, TAKE 1 TABLET(2.5 MG) BY MOUTH TWICE DAILY, Disp: 60 tablet, Rfl: 5    sildenafil (VIAGRA) 100 MG tablet, Take 1 tablet (100 mg total) by mouth daily as needed for Erectile Dysfunction., Disp: 4 tablet, Rfl: 4    tadalafil (CIALIS) 5 MG tablet, Take 1 tablet (5 mg total) by mouth daily as needed for Erectile Dysfunction., Disp: 30 tablet, Rfl: 6      Objective:     Vitals:  Blood pressure (!) 142/80, temperature 98.7 °F (37.1 °C), weight 76.6 kg (168 lb 14.4 oz).    Physical Examination:   GEN: wn/wd male in no apparent distress  SKIN: no rashes, no sclerodactyly, no Raynaud's, no periungual erythema, no digital tip ulcerations, no nailbed pitting  HEAD: no alopecia, no scalp tenderness, no temporal artery tenderness or induration.  EYES: no pallor, no icterus, PERRLA  ENT:  no thrush, no mucosal dryness or ulcerations, adequate oral hygiene & dentition.  NECK: supple x 6, no masses, no thyromegaly, no lymphadenopathy.  CV:   S1 and S2 regular, no murmurs, gallop or rubs  CHEST: Normal respiratory effort;  normal breath sounds/no adventitious sounds. No signs of consolidation.  ABD: non-tender and non-distended; soft; normal bowel sounds; no rebound/guarding or tenderness. No hepatosplenomegaly.  Musculoskeletal:  No evidence of active synovitis.  No progressive deformity  EXTREM: no clubbing, cyanosis or edema. normal pulses.  NEURO:  grossly intact; motor/sensory WNL; no tremors  PSYCH:  normal mood, affect and behavior    Labs:   Lab Results   Component Value Date    WBC 5.7 03/05/2020    HGB 14.6 03/05/2020    HCT 42.8 03/05/2020    MCV 89.0 03/05/2020     03/05/2020   CMP@  Sodium    Date Value Ref Range Status   03/05/2020 142 135 - 146 mmol/L Final   04/24/2019 139 134 - 144 mmol/L      Potassium   Date Value Ref Range Status   03/05/2020 5.3 3.5 - 5.3 mmol/L Final     Chloride   Date Value Ref Range Status   03/05/2020 101 98 - 110 mmol/L Final   04/24/2019 102 98 - 110 mmol/L      CO2   Date Value Ref Range Status   03/05/2020 33 (H) 20 - 32 mmol/L Final     Glucose   Date Value Ref Range Status   03/05/2020 96 65 - 99 mg/dL Final     Comment:                   Fasting reference interval        04/24/2019 85 70 - 99 mg/dL      BUN, Bld   Date Value Ref Range Status   03/05/2020 12 7 - 25 mg/dL Final     Creatinine   Date Value Ref Range Status   03/05/2020 0.92 0.70 - 1.25 mg/dL Final     Comment:     For patients >49 years of age, the reference limit  for Creatinine is approximately 13% higher for people  identified as -American.        04/24/2019 0.82 0.60 - 1.40 mg/dL      Calcium   Date Value Ref Range Status   03/05/2020 10.1 8.6 - 10.3 mg/dL Final     Total Protein   Date Value Ref Range Status   03/05/2020 7.2 6.1 - 8.1 g/dL Final     Albumin   Date Value Ref Range Status   03/05/2020 4.7 3.6 - 5.1 g/dL Final   04/24/2019 4.1 3.1 - 4.7 g/dL      Total Bilirubin   Date Value Ref Range Status   03/05/2020 0.9 0.2 - 1.2 mg/dL Final     Alkaline Phosphatase   Date Value Ref Range Status   03/05/2020 51 35 - 144 U/L Final     AST   Date Value Ref Range Status   03/05/2020 17 10 - 35 U/L Final     ALT   Date Value Ref Range Status   03/05/2020 25 9 - 46 U/L Final     CRP   Date Value Ref Range Status   08/06/2019 0.03 0.00 - 0.75 mg/dL Final     Rheumatoid Factor   Date Value Ref Range Status   09/26/2017 <10.0 0.0 - 13.9 IU/mL      Comment:     Performed at: [facility], [facility] [id number] [place] [address], [place], [id number] Ragland MD, Phone:  [phone]       Radiology/Diagnostic Studies:    None    Assessment/Discussion/Plan:   70 y.o. male with in chronic  inflammatory arthritis-well controlled on methotrexate 12.5 mg weekly, Plaquenil 400 mg daily, and prednisone 2.5 mg twice daily    PLAN:  I will continue his medications without change.  Routine blood testing was ordered.    RTC:  I will see him back in 4 months    Electronically signed by Calderon Barrett MD

## 2020-06-11 ENCOUNTER — OFFICE VISIT (OUTPATIENT)
Dept: FAMILY MEDICINE | Facility: CLINIC | Age: 70
End: 2020-06-11
Payer: MEDICARE

## 2020-06-11 VITALS
WEIGHT: 169 LBS | TEMPERATURE: 99 F | HEIGHT: 67 IN | HEART RATE: 72 BPM | DIASTOLIC BLOOD PRESSURE: 64 MMHG | BODY MASS INDEX: 26.53 KG/M2 | SYSTOLIC BLOOD PRESSURE: 110 MMHG

## 2020-06-11 DIAGNOSIS — E11.9 TYPE 2 DIABETES MELLITUS WITHOUT RETINOPATHY: Primary | ICD-10-CM

## 2020-06-11 DIAGNOSIS — E78.5 HYPERLIPIDEMIA ASSOCIATED WITH TYPE 2 DIABETES MELLITUS: ICD-10-CM

## 2020-06-11 DIAGNOSIS — M06.9 RHEUMATOID ARTHRITIS, INVOLVING UNSPECIFIED SITE, UNSPECIFIED RHEUMATOID FACTOR PRESENCE: ICD-10-CM

## 2020-06-11 DIAGNOSIS — E11.69 HYPERLIPIDEMIA ASSOCIATED WITH TYPE 2 DIABETES MELLITUS: ICD-10-CM

## 2020-06-11 LAB — HBA1C MFR BLD: 6.4 %

## 2020-06-11 PROCEDURE — 3074F SYST BP LT 130 MM HG: CPT | Mod: S$GLB,,, | Performed by: PHYSICIAN ASSISTANT

## 2020-06-11 PROCEDURE — 3078F PR MOST RECENT DIASTOLIC BLOOD PRESSURE < 80 MM HG: ICD-10-PCS | Mod: S$GLB,,, | Performed by: PHYSICIAN ASSISTANT

## 2020-06-11 PROCEDURE — 1101F PR PT FALLS ASSESS DOC 0-1 FALLS W/OUT INJ PAST YR: ICD-10-PCS | Mod: S$GLB,,, | Performed by: PHYSICIAN ASSISTANT

## 2020-06-11 PROCEDURE — 99214 OFFICE O/P EST MOD 30 MIN: CPT | Mod: S$GLB,,, | Performed by: PHYSICIAN ASSISTANT

## 2020-06-11 PROCEDURE — 3044F HG A1C LEVEL LT 7.0%: CPT | Mod: S$GLB,,, | Performed by: PHYSICIAN ASSISTANT

## 2020-06-11 PROCEDURE — 1159F MED LIST DOCD IN RCRD: CPT | Mod: S$GLB,,, | Performed by: PHYSICIAN ASSISTANT

## 2020-06-11 PROCEDURE — 3078F DIAST BP <80 MM HG: CPT | Mod: S$GLB,,, | Performed by: PHYSICIAN ASSISTANT

## 2020-06-11 PROCEDURE — 1101F PT FALLS ASSESS-DOCD LE1/YR: CPT | Mod: S$GLB,,, | Performed by: PHYSICIAN ASSISTANT

## 2020-06-11 PROCEDURE — 1159F PR MEDICATION LIST DOCUMENTED IN MEDICAL RECORD: ICD-10-PCS | Mod: S$GLB,,, | Performed by: PHYSICIAN ASSISTANT

## 2020-06-11 PROCEDURE — 83036 HEMOGLOBIN GLYCOSYLATED A1C: CPT | Mod: QW,,, | Performed by: PHYSICIAN ASSISTANT

## 2020-06-11 PROCEDURE — 83036 POCT HEMOGLOBIN A1C: ICD-10-PCS | Mod: QW,,, | Performed by: PHYSICIAN ASSISTANT

## 2020-06-11 PROCEDURE — 3044F PR MOST RECENT HEMOGLOBIN A1C LEVEL <7.0%: ICD-10-PCS | Mod: S$GLB,,, | Performed by: PHYSICIAN ASSISTANT

## 2020-06-11 PROCEDURE — 99214 PR OFFICE/OUTPT VISIT, EST, LEVL IV, 30-39 MIN: ICD-10-PCS | Mod: S$GLB,,, | Performed by: PHYSICIAN ASSISTANT

## 2020-06-11 PROCEDURE — 3074F PR MOST RECENT SYSTOLIC BLOOD PRESSURE < 130 MM HG: ICD-10-PCS | Mod: S$GLB,,, | Performed by: PHYSICIAN ASSISTANT

## 2020-06-11 NOTE — PATIENT INSTRUCTIONS
Diet: Diabetes  Food is an important tool that you can use to control diabetes and stay healthy. Eating well-balanced meals in the correct amounts will help you control your blood glucose levels and prevent low blood sugar reactions. It will also help you reduce the health risks of diabetes. There is no one specific diet that is right for everyone with diabetes. But there are general guidelines to follow. A registered dietitian (RD) will create a tailored diet approach thats just right for you. He or she will also help you plan healthy meals and snacks. If you have any questions, call your dietitian for advice.     Guidelines for success  Talk with your healthcare provider before starting a diabetes diet or weight loss program. If you haven't talked with a dietitian yet, ask your provider for a referral. The following guidelines can help you succeed:  · Select foods from the 6 food groups below. Your dietitian will help you find food choices within each group. He or she will also show you serving sizes and how many servings you can have at each meal.  ¨ Grains, beans, and starchy vegetables  ¨ Vegetables  ¨ Fruit  ¨ Milk or yogurt  ¨ Meat, poultry, fish, or tofu  ¨ Healthy fats  · Check your blood sugar levels as directed by your provider. Take any medicine as prescribed by your provider.  · Learn to read food labels and pick the right portion sizes.  · Eat only the amount of food in your meal plan. Eat about the same amount of food at regular times each day. Dont skip meals. Eat meals 4 to 5 hours apart, with snacks in between.  · Limit alcohol. It raises blood sugar levels. Drink water or calorie-free diet drinks that use safe sweeteners.  · Eat less fat to help lower your risk of heart disease. Use nonfat or low-fat dairy products and lean meats. Avoid fried foods. Use cooking oils that are unsaturated, such as olive, canola, or peanut oil.  · Talk with your dietitian about safe sugar substitutes.  · Avoid  added salt. It can contribute to high blood pressure, which can cause heart disease. People with diabetes already have a risk of high blood pressure and heart disease.  · Stay at a healthy weight. If you need to lose weight, cut down on your portion sizes. But dont skip meals. Exercise is an important part of any weight management program. Talk with your provider about an exercise program thats right for you.  · For more information about the best diet plan for you, talk with a registered dietitian (RD). To find an RD in your area, contact:  ¨ Academy of Nutrition and Dietetics www.eatright.org  ¨ The American Diabetes Association 975-746-3597 www.diabetes.org  Date Last Reviewed: 8/1/2016 © 2000-2017 [employer]. [address] [place] [address], [place] [address]. All rights reserved. This information is not intended as a substitute for professional medical care. Always follow your healthcare professional's instructions.

## 2020-06-11 NOTE — PROGRESS NOTES
SUBJECTIVE:    Patient ID: Kyrie Norton Jr. is a 70 y.o. male.    Chief Complaint: Diabetes (brought bottles, Eye exam pt will sched, PNA declined, set up for foot exam// SW)    71 yo wm presents for regular checkup. Reports that he has been doing GREAT! NO new concerns at this time. A1c well managed at 6.4%. Compliant with therapy as prescribed. Due for foot exam. Staying healthy. Dr. Barrett managed RA. On plaquenil/MTX. Denies any CP or SOB. All labs reviewed from the last 3 month visit. Completed cologuard last year and PSA UTD.      Office Visit on 03/10/2020   Component Date Value Ref Range Status    TESTOSTERONE, TOTAL, MALE 03/11/2020 502  250 - 827 ng/dL Final   Orders Only on 03/05/2020   Component Date Value Ref Range Status    Cholesterol 03/05/2020 156  <200 mg/dL Final    HDL 03/05/2020 52  > OR = 40 mg/dL Final    Triglycerides 03/05/2020 128  <150 mg/dL Final    LDL Cholesterol 03/05/2020 82  mg/dL (calc) Final    Hdl/Cholesterol Ratio 03/05/2020 3.0  <5.0 (calc) Final    Non HDL Chol. (LDL+VLDL) 03/05/2020 104  <130 mg/dL (calc) Final    Creatinine, Random Ur 03/05/2020 132  20 - 320 mg/dL Final    Microalb, Ur 03/05/2020 0.4  See Note: mg/dL Final    Microalb Creat Ratio 03/05/2020 3  <30 mcg/mg creat Final    Glucose 03/05/2020 96  65 - 99 mg/dL Final    BUN, Bld 03/05/2020 12  7 - 25 mg/dL Final    Creatinine 03/05/2020 0.92  0.70 - 1.25 mg/dL Final    eGFR if non African American 03/05/2020 85  > OR = 60 mL/min/1.73m2 Final    eGFR if African American 03/05/2020 99  > OR = 60 mL/min/1.73m2 Final    BUN/Creatinine Ratio 03/05/2020 NOT APPLICABLE  6 - 22 (calc) Final    Sodium 03/05/2020 142  135 - 146 mmol/L Final    Potassium 03/05/2020 5.3  3.5 - 5.3 mmol/L Final    Chloride 03/05/2020 101  98 - 110 mmol/L Final    CO2 03/05/2020 33* 20 - 32 mmol/L Final    Calcium 03/05/2020 10.1  8.6 - 10.3 mg/dL Final    Total Protein 03/05/2020 7.2  6.1 - 8.1 g/dL Final    Albumin  03/05/2020 4.7  3.6 - 5.1 g/dL Final    Globulin, Total 03/05/2020 2.5  1.9 - 3.7 g/dL (calc) Final    Albumin/Globulin Ratio 03/05/2020 1.9  1.0 - 2.5 (calc) Final    Total Bilirubin 03/05/2020 0.9  0.2 - 1.2 mg/dL Final    Alkaline Phosphatase 03/05/2020 51  35 - 144 U/L Final    AST 03/05/2020 17  10 - 35 U/L Final    ALT 03/05/2020 25  9 - 46 U/L Final    Color, UA 03/05/2020 YELLOW  YELLOW Final    Appearance, UA 03/05/2020 CLEAR  CLEAR Final    Specific Gravity, UA 03/05/2020 1.018  1.001 - 1.035 Final    pH, UA 03/05/2020 < OR = 5.0  5.0 - 8.0 Final    Glucose, UA 03/05/2020 NEGATIVE  NEGATIVE Final    Bilirubin, UA 03/05/2020 NEGATIVE  NEGATIVE Final    Ketones, UA 03/05/2020 NEGATIVE  NEGATIVE Final    Occult Blood UA 03/05/2020 NEGATIVE  NEGATIVE Final    Protein, UA 03/05/2020 NEGATIVE  NEGATIVE Final    Nitrite, UA 03/05/2020 NEGATIVE  NEGATIVE Final    Leukocytes, UA 03/05/2020 NEGATIVE  NEGATIVE Final    WBC Casts, UA 03/05/2020 NONE SEEN  < OR = 5 /HPF Final    RBC Casts, UA 03/05/2020 NONE SEEN  < OR = 2 /HPF Final    Squam Epithel, UA 03/05/2020 NONE SEEN  < OR = 5 /HPF Final    Bacteria, UA 03/05/2020 NONE SEEN  NONE SEEN /HPF Final    Hyaline Casts, UA 03/05/2020 NONE SEEN  NONE SEEN /LPF Final    Reflexive Urine Culture 03/05/2020 NO CULTURE INDICATED   Final    WBC 03/05/2020 5.7  3.8 - 10.8 Thousand/uL Final    RBC 03/05/2020 4.81  4.20 - 5.80 Million/uL Final    Hemoglobin 03/05/2020 14.6  13.2 - 17.1 g/dL Final    Hematocrit 03/05/2020 42.8  38.5 - 50.0 % Final    Mean Corpuscular Volume 03/05/2020 89.0  80.0 - 100.0 fL Final    Mean Corpuscular Hemoglobin 03/05/2020 30.4  27.0 - 33.0 pg Final    Mean Corpuscular Hemoglobin Conc 03/05/2020 34.1  32.0 - 36.0 g/dL Final    RDW 03/05/2020 13.8  11.0 - 15.0 % Final    Platelets 03/05/2020 253  140 - 400 Thousand/uL Final    MPV 03/05/2020 10.4  7.5 - 12.5 fL Final    Neutrophils Absolute 03/05/2020 2,873  1,500 -  7,800 cells/uL Final    Lymph # 03/05/2020 2,212  850 - 3,900 cells/uL Final    Mono # 03/05/2020 467  200 - 950 cells/uL Final    Eos # 03/05/2020 131  15 - 500 cells/uL Final    Baso # 03/05/2020 17  0 - 200 cells/uL Final    Neutrophils Relative 03/05/2020 50.4  % Final    Lymph% 03/05/2020 38.8  % Final    Mono% 03/05/2020 8.2  % Final    Eosinophil% 03/05/2020 2.3  % Final    Basophil% 03/05/2020 0.3  % Final    TSH 03/05/2020 2.01  0.40 - 4.50 mIU/L Final    Hemoglobin A1C 03/05/2020 6.3* <5.7 % of total Hgb Final       Past Medical History:   Diagnosis Date    Diabetes mellitus type II     Hyperlipidemia     Hypertension      History reviewed. No pertinent surgical history.  Family History   Problem Relation Age of Onset    Blindness Neg Hx     Glaucoma Neg Hx     Macular degeneration Neg Hx        Marital Status:   Alcohol History:  reports that he drinks alcohol.  Tobacco History:  reports that he has never smoked. He has never used smokeless tobacco.  Drug History:  reports that he does not use drugs.    Review of patient's allergies indicates:   Allergen Reactions    Parafon forte Nausea Only       Current Outpatient Medications:     aspirin (ECOTRIN) 81 MG EC tablet, Take 81 mg by mouth once daily., Disp: , Rfl:     atorvastatin (LIPITOR) 40 MG tablet, Take 1 tablet (40 mg total) by mouth once daily., Disp: 90 tablet, Rfl: 4    blood sugar diagnostic (ACCU-CHEK CJ PLUS TEST STRP) Strp, 1 strip by Subdermal route Daily., Disp: 100 strip, Rfl: 4    coenzyme Q10 (CO Q-10) 10 mg capsule, Take 10 mg by mouth once daily., Disp: , Rfl:     fish oil-omega-3 fatty acids 300-1,000 mg capsule, Take 2 g by mouth once daily., Disp: , Rfl:     folic acid (FOLVITE) 1 MG tablet, TAKE 1 TABLET BY MOUTH EVERY DAY, Disp: 90 tablet, Rfl: 3    glipizide-metformin (METAGLIP) 5-500 mg per tablet, 1 tablet in the am, 2 tablets in the pm., Disp: 270 tablet, Rfl: 1    hydroxychloroquine  "(PLAQUENIL) 200 mg tablet, TAKE 1 TABLET BY MOUTH EVERY 12 HOURS, Disp: 60 tablet, Rfl: 5    lancets (ACCU-CHEK FASTCLIX) Misc, 1 Device by Misc.(Non-Drug; Combo Route) route Daily., Disp: 100 each, Rfl: 4    methotrexate 2.5 MG Tab, TAKE 5 TABLETS BY MOUTH EVERY 7 DAYS, Disp: 60 tablet, Rfl: 1    MILK THISTLE ORAL, Take 250 mg by mouth once daily. , Disp: , Rfl:     predniSONE (DELTASONE) 2.5 MG tablet, TAKE 1 TABLET(2.5 MG) BY MOUTH TWICE DAILY, Disp: 60 tablet, Rfl: 5    ibuprofen (ADVIL,MOTRIN) 800 MG tablet, Take 1 tablet (800 mg total) by mouth every 6 (six) hours as needed for Pain., Disp: 20 tablet, Rfl: 0    sildenafil (VIAGRA) 100 MG tablet, Take 1 tablet (100 mg total) by mouth daily as needed for Erectile Dysfunction., Disp: 4 tablet, Rfl: 4    tadalafil (CIALIS) 5 MG tablet, Take 1 tablet (5 mg total) by mouth daily as needed for Erectile Dysfunction., Disp: 30 tablet, Rfl: 6    Review of Systems   Constitutional: Negative for activity change, fatigue, fever and unexpected weight change.   HENT: Negative for congestion.    Respiratory: Negative for apnea, cough, chest tightness and shortness of breath.    Cardiovascular: Negative for chest pain and palpitations.   Gastrointestinal: Negative for abdominal distention and abdominal pain.   Genitourinary: Negative for difficulty urinating and dysuria.   Musculoskeletal: Negative for arthralgias and back pain.   Neurological: Negative for dizziness and weakness.          Objective:      Vitals:    06/11/20 0657   BP: 110/64   Pulse: 72   Temp: 98.7 °F (37.1 °C)   Weight: 76.7 kg (169 lb)   Height: 5' 7" (1.702 m)     Physical Exam   Constitutional: He is oriented to person, place, and time. He appears well-developed and well-nourished. No distress.   HENT:   Head: Normocephalic and atraumatic.   Eyes: Pupils are equal, round, and reactive to light.   Neck: Normal range of motion. Neck supple. No thyromegaly present.   Cardiovascular: Normal rate, " regular rhythm, normal heart sounds and intact distal pulses.   Pulses:       Dorsalis pedis pulses are 2+ on the right side, and 2+ on the left side.        Posterior tibial pulses are 2+ on the right side, and 2+ on the left side.   Pulmonary/Chest: Effort normal and breath sounds normal.   Abdominal: Soft. Bowel sounds are normal. He exhibits no distension. There is no tenderness.   Musculoskeletal: Normal range of motion.        Right foot: There is normal range of motion and no deformity.        Left foot: There is normal range of motion and no deformity.   Feet:   Right Foot:   Protective Sensation: 2 sites tested. 2 sites sensed.   Skin Integrity: Positive for callus and dry skin. Negative for ulcer, blister or skin breakdown.   Left Foot:   Protective Sensation: 2 sites tested. 2 sites sensed.   Skin Integrity: Positive for callus. Negative for ulcer or blister.   Neurological: He is alert and oriented to person, place, and time. No cranial nerve deficit.   Skin: Skin is warm and dry. No rash noted. No erythema.         Assessment:       1. Type 2 diabetes mellitus without retinopathy    2. Rheumatoid arthritis, involving unspecified site, unspecified rheumatoid factor presence    3. Hyperlipidemia associated with type 2 diabetes mellitus         Plan:       Type 2 diabetes mellitus without retinopathy  Comments:  a1c at 6.4% today and right at goal. Denies needing refills. Continue as is. DFE documented  Orders:  -     Hemoglobin A1C, POCT    Rheumatoid arthritis, involving unspecified site, unspecified rheumatoid factor presence  Comments:  stable and well managed per Dr. Barrett. continue DMARDs as is.    Hyperlipidemia associated with type 2 diabetes mellitus  Comments:  very well controlled on recent labs. no changes today.      Follow up in about 6 months (around 12/11/2020) for Diabetic Check-Up.        6/11/2020 Marco Antonio Loyola PA-C

## 2020-06-29 ENCOUNTER — LAB VISIT (OUTPATIENT)
Dept: LAB | Facility: HOSPITAL | Age: 70
End: 2020-06-29
Attending: INTERNAL MEDICINE
Payer: MEDICARE

## 2020-06-29 DIAGNOSIS — Z79.899 ENCOUNTER FOR LONG-TERM (CURRENT) DRUG USE: ICD-10-CM

## 2020-06-29 DIAGNOSIS — M19.90 CHRONIC INFLAMMATORY ARTHRITIS: ICD-10-CM

## 2020-06-29 LAB
BASOPHILS # BLD AUTO: 0.03 K/UL (ref 0–0.2)
BASOPHILS NFR BLD: 0.4 % (ref 0–1.9)
DIFFERENTIAL METHOD: NORMAL
EOSINOPHIL # BLD AUTO: 0.1 K/UL (ref 0–0.5)
EOSINOPHIL NFR BLD: 1.9 % (ref 0–8)
ERYTHROCYTE [DISTWIDTH] IN BLOOD BY AUTOMATED COUNT: 13.2 % (ref 11.5–14.5)
HCT VFR BLD AUTO: 43 % (ref 40–54)
HGB BLD-MCNC: 14.2 G/DL (ref 14–18)
IMM GRANULOCYTES # BLD AUTO: 0.03 K/UL (ref 0–0.04)
IMM GRANULOCYTES NFR BLD AUTO: 0.4 % (ref 0–0.5)
LYMPHOCYTES # BLD AUTO: 2.6 K/UL (ref 1–4.8)
LYMPHOCYTES NFR BLD: 38.2 % (ref 18–48)
MCH RBC QN AUTO: 30.2 PG (ref 27–31)
MCHC RBC AUTO-ENTMCNC: 33 G/DL (ref 32–36)
MCV RBC AUTO: 92 FL (ref 82–98)
MONOCYTES # BLD AUTO: 0.6 K/UL (ref 0.3–1)
MONOCYTES NFR BLD: 8.5 % (ref 4–15)
NEUTROPHILS # BLD AUTO: 3.5 K/UL (ref 1.8–7.7)
NEUTROPHILS NFR BLD: 50.6 % (ref 38–73)
NRBC BLD-RTO: 0 /100 WBC
PLATELET # BLD AUTO: 246 K/UL (ref 150–350)
PMV BLD AUTO: 9.8 FL (ref 9.2–12.9)
RBC # BLD AUTO: 4.7 M/UL (ref 4.6–6.2)
WBC # BLD AUTO: 6.85 K/UL (ref 3.9–12.7)

## 2020-06-29 PROCEDURE — 36415 COLL VENOUS BLD VENIPUNCTURE: CPT

## 2020-06-29 PROCEDURE — 85025 COMPLETE CBC W/AUTO DIFF WBC: CPT

## 2020-07-14 ENCOUNTER — OFFICE VISIT (OUTPATIENT)
Dept: RHEUMATOLOGY | Facility: CLINIC | Age: 70
End: 2020-07-14
Payer: MEDICARE

## 2020-07-14 VITALS
SYSTOLIC BLOOD PRESSURE: 144 MMHG | TEMPERATURE: 98 F | BODY MASS INDEX: 26.17 KG/M2 | DIASTOLIC BLOOD PRESSURE: 78 MMHG | WEIGHT: 167.13 LBS

## 2020-07-14 DIAGNOSIS — M05.79 RHEUMATOID ARTHRITIS INVOLVING MULTIPLE SITES WITH POSITIVE RHEUMATOID FACTOR: Primary | ICD-10-CM

## 2020-07-14 DIAGNOSIS — Z79.899 ENCOUNTER FOR LONG-TERM (CURRENT) DRUG USE: ICD-10-CM

## 2020-07-14 PROCEDURE — 1125F AMNT PAIN NOTED PAIN PRSNT: CPT | Mod: S$GLB,,, | Performed by: INTERNAL MEDICINE

## 2020-07-14 PROCEDURE — 1159F PR MEDICATION LIST DOCUMENTED IN MEDICAL RECORD: ICD-10-PCS | Mod: S$GLB,,, | Performed by: INTERNAL MEDICINE

## 2020-07-14 PROCEDURE — 99213 OFFICE O/P EST LOW 20 MIN: CPT | Mod: S$GLB,,, | Performed by: INTERNAL MEDICINE

## 2020-07-14 PROCEDURE — 99213 PR OFFICE/OUTPT VISIT, EST, LEVL III, 20-29 MIN: ICD-10-PCS | Mod: S$GLB,,, | Performed by: INTERNAL MEDICINE

## 2020-07-14 PROCEDURE — 1101F PT FALLS ASSESS-DOCD LE1/YR: CPT | Mod: S$GLB,,, | Performed by: INTERNAL MEDICINE

## 2020-07-14 PROCEDURE — 1159F MED LIST DOCD IN RCRD: CPT | Mod: S$GLB,,, | Performed by: INTERNAL MEDICINE

## 2020-07-14 PROCEDURE — 1125F PR PAIN SEVERITY QUANTIFIED, PAIN PRESENT: ICD-10-PCS | Mod: S$GLB,,, | Performed by: INTERNAL MEDICINE

## 2020-07-14 PROCEDURE — 1101F PR PT FALLS ASSESS DOC 0-1 FALLS W/OUT INJ PAST YR: ICD-10-PCS | Mod: S$GLB,,, | Performed by: INTERNAL MEDICINE

## 2020-07-14 NOTE — PROGRESS NOTES
[facility] RHEUMATOLOGY           New patient visit    Notes dictated to M*Modal. Please forgive any unintentional errors.  Subjective:       Patient ID:   NAME: Kyrie Norton Jr. : 1950     70 y.o. male    Referring Doc: No ref. provider found  Other Physicians:    Chief Complaint:  Chronic inflammatory arthritis      HPI:  The patient is doing very well.  He has no complaints of any red, hot, and/or swollen joints.  He has no morning stiffness.    ROS:   GEN:      no fever, night sweats or weight loss  SKIN:     no rashes, erythema, bruising, or swelling, no Raynauds, no photosensitivity  HEENT:  no acute changes in vision, no mouth ulcers, no sicca symptoms, no scalp tenderness, jaw claudication.  CV:        no CP, PND, JUAN or orthopnea, no palpitations  PULM:   no SOB, cough, hemoptysis, sputum or pleuritic pain  GI:          No dysphagia, GERD, hematemesis; no abdominal pain, nausea, vomiting, constipation, diarrhea, melanotic stools, BRBPR.  :        no hematuria, dysuria  NEURO: no paresthesias, headaches, visual disturbances  MUSCULOSKELETAL:  As above  PSYCH: No insomnia, no anxiety, no depression    Past Medical/Surgical History:  Past Medical History:   Diagnosis Date    Diabetes mellitus type II     Hyperlipidemia     Hypertension      History reviewed. No pertinent surgical history.    Allergies:  Review of patient's allergies indicates:   Allergen Reactions    Parafon forte Nausea Only       Social/Family History:  Social History     Socioeconomic History    Marital status:      Spouse name: Not on file    Number of children: Not on file    Years of education: Not on file    Highest education level: Not on file   Occupational History    Not on file   Social Needs    Financial resource strain: Not on file    Food insecurity     Worry: Not on file     Inability: Not on file    Transportation needs     Medical: Not on file     Non-medical: Not on file   Tobacco Use    Smoking  status: Never Smoker    Smokeless tobacco: Never Used   Substance and Sexual Activity    Alcohol use: Yes     Comment: occasional    Drug use: No    Sexual activity: Not on file   Lifestyle    Physical activity     Days per week: Not on file     Minutes per session: Not on file    Stress: Not on file   Relationships    Social connections     Talks on phone: Not on file     Gets together: Not on file     Attends Gnosticist service: Not on file     Active member of club or organization: Not on file     Attends meetings of clubs or organizations: Not on file     Relationship status: Not on file   Other Topics Concern    Not on file   Social History Narrative    Not on file     Family History   Problem Relation Age of Onset    Blindness Neg Hx     Glaucoma Neg Hx     Macular degeneration Neg Hx      FAMILY HISTORY: negative for Connective Tissue Disease      Medications:    Current Outpatient Medications:     aspirin (ECOTRIN) 81 MG EC tablet, Take 81 mg by mouth once daily., Disp: , Rfl:     atorvastatin (LIPITOR) 40 MG tablet, Take 1 tablet (40 mg total) by mouth once daily., Disp: 90 tablet, Rfl: 4    blood sugar diagnostic (ACCU-CHEK CJ PLUS TEST STRP) Strp, 1 strip by Subdermal route Daily., Disp: 100 strip, Rfl: 4    coenzyme Q10 (CO Q-10) 10 mg capsule, Take 10 mg by mouth once daily., Disp: , Rfl:     fish oil-omega-3 fatty acids 300-1,000 mg capsule, Take 2 g by mouth once daily., Disp: , Rfl:     folic acid (FOLVITE) 1 MG tablet, TAKE 1 TABLET BY MOUTH EVERY DAY, Disp: 90 tablet, Rfl: 3    glipizide-metformin (METAGLIP) 5-500 mg per tablet, 1 tablet in the am, 2 tablets in the pm., Disp: 270 tablet, Rfl: 1    hydroxychloroquine (PLAQUENIL) 200 mg tablet, TAKE 1 TABLET BY MOUTH EVERY 12 HOURS, Disp: 60 tablet, Rfl: 5    ibuprofen (ADVIL,MOTRIN) 800 MG tablet, Take 1 tablet (800 mg total) by mouth every 6 (six) hours as needed for Pain., Disp: 20 tablet, Rfl: 0    lancets (ACCU-CHEK  FASTCLIX) Misc, 1 Device by Misc.(Non-Drug; Combo Route) route Daily., Disp: 100 each, Rfl: 4    methotrexate 2.5 MG Tab, TAKE 5 TABLETS BY MOUTH EVERY 7 DAYS, Disp: 60 tablet, Rfl: 1    MILK THISTLE ORAL, Take 250 mg by mouth once daily. , Disp: , Rfl:     predniSONE (DELTASONE) 2.5 MG tablet, TAKE 1 TABLET(2.5 MG) BY MOUTH TWICE DAILY, Disp: 60 tablet, Rfl: 5    sildenafil (VIAGRA) 100 MG tablet, Take 1 tablet (100 mg total) by mouth daily as needed for Erectile Dysfunction., Disp: 4 tablet, Rfl: 4    tadalafil (CIALIS) 5 MG tablet, Take 1 tablet (5 mg total) by mouth daily as needed for Erectile Dysfunction., Disp: 30 tablet, Rfl: 6    Objective:     Vitals:  Blood pressure (!) 144/78, temperature 98.3 °F (36.8 °C), weight 75.8 kg (167 lb 1.6 oz).    Physical Examination:   GEN:     wn/wd male in no apparent distress  SKIN:    no rashes, no sclerodactyly, no Raynaud's, no periungual erythema, no digital tip tapering, no nailbed pitting  HEAD:   no alopecia, no scalp tenderness, no temporal artery tenderness or induration.  EYES:   no conjunctival pallor, no icterus  ENT:     no thrush, no mucosal dryness or ulcerations, adequate oral hygiene & dentition.  NECK:  supple x 6, no masses, no thyromegaly, no lymphadenopathy.  CV:        S1 and S2, RRR, no murmurs, gallop or rubs  CHEST: Normal respiratory effort;  normal breath sounds/no adventitious sounds. No signs of consolidation.  ABD:      non-tender and non-distended; soft; normal bowel sounds; no rebound, guarding, or tenderness. No hepatosplenomegaly.  Musculoskeletal:  No active synovitis.  No progressive deformity  EXTREM: no clubbing, cyanosis or edema. normal pulses. Stephanie's - x2  NEURO:  grossly intact; motor/sensory WNL; no tremors  PSYCH:  normal mood, affect and behavior    Labs:   Lab Results   Component Value Date    WBC 6.85 06/29/2020    HGB 14.2 06/29/2020    HCT 43.0 06/29/2020    MCV 92 06/29/2020     06/29/2020   CMP@  Sodium    Date Value Ref Range Status   03/05/2020 142 135 - 146 mmol/L Final   04/24/2019 139 134 - 144 mmol/L      Potassium   Date Value Ref Range Status   03/05/2020 5.3 3.5 - 5.3 mmol/L Final     Chloride   Date Value Ref Range Status   03/05/2020 101 98 - 110 mmol/L Final   04/24/2019 102 98 - 110 mmol/L      CO2   Date Value Ref Range Status   03/05/2020 33 (H) 20 - 32 mmol/L Final     Glucose   Date Value Ref Range Status   03/05/2020 96 65 - 99 mg/dL Final     Comment:                   Fasting reference interval        04/24/2019 85 70 - 99 mg/dL      BUN, Bld   Date Value Ref Range Status   03/05/2020 12 7 - 25 mg/dL Final     Creatinine   Date Value Ref Range Status   03/05/2020 0.92 0.70 - 1.25 mg/dL Final     Comment:     For patients >49 years of age, the reference limit  for Creatinine is approximately 13% higher for people  identified as -American.        04/24/2019 0.82 0.60 - 1.40 mg/dL      Calcium   Date Value Ref Range Status   03/05/2020 10.1 8.6 - 10.3 mg/dL Final     Total Protein   Date Value Ref Range Status   03/05/2020 7.2 6.1 - 8.1 g/dL Final     Albumin   Date Value Ref Range Status   03/05/2020 4.7 3.6 - 5.1 g/dL Final   04/24/2019 4.1 3.1 - 4.7 g/dL      Total Bilirubin   Date Value Ref Range Status   03/05/2020 0.9 0.2 - 1.2 mg/dL Final     Alkaline Phosphatase   Date Value Ref Range Status   03/05/2020 51 35 - 144 U/L Final     AST   Date Value Ref Range Status   03/05/2020 17 10 - 35 U/L Final     ALT   Date Value Ref Range Status   03/05/2020 25 9 - 46 U/L Final     CRP   Date Value Ref Range Status   08/06/2019 0.03 0.00 - 0.75 mg/dL Final     Rheumatoid Factor   Date Value Ref Range Status   09/26/2017 <10.0 0.0 - 13.9 IU/mL      Comment:     Performed at: [facility], [facility] [id number] [place] [address], [place], [id number] Ragland MD, Phone:  [phone]       Radiology/Diagnostic Studies:    None    Assessment/Discussion/Plan:   70 y.o. male with chronic inflammatory  arthritis-excellent control on Plaquenil 400 mg daily, methotrexate 12.5 mg weekly, and prednisone 2.5 mg twice daily    PLAN:  I will continue his medications unchanged.  Routine blood testing was ordered.    RTC:  I will see him back in 4 months            Electronically signed by Calderon Barrett MD

## 2020-08-05 DIAGNOSIS — M05.79 RHEUMATOID ARTHRITIS INVOLVING MULTIPLE SITES WITH POSITIVE RHEUMATOID FACTOR: ICD-10-CM

## 2020-08-05 RX ORDER — METHOTREXATE 2.5 MG/1
TABLET ORAL
Qty: 60 TABLET | Refills: 1 | Status: SHIPPED | OUTPATIENT
Start: 2020-08-05 | End: 2021-02-09

## 2020-08-19 ENCOUNTER — TELEPHONE (OUTPATIENT)
Dept: FAMILY MEDICINE | Facility: CLINIC | Age: 70
End: 2020-08-19

## 2020-08-19 DIAGNOSIS — Z12.5 SCREENING FOR PROSTATE CANCER: Primary | ICD-10-CM

## 2020-08-19 NOTE — TELEPHONE ENCOUNTER
From Remind Me-PSA due. LMOR that PSA is due and he does not need to be fasting, that order is at [facility]. Order pended. Updated remind me.

## 2020-08-31 DIAGNOSIS — E11.9 TYPE 2 DIABETES MELLITUS WITHOUT RETINOPATHY: ICD-10-CM

## 2020-08-31 RX ORDER — GLIPIZIDE AND METFORMIN HCL 5; 500 MG/1; MG/1
TABLET, FILM COATED ORAL
Qty: 270 TABLET | Refills: 1 | Status: SHIPPED | OUTPATIENT
Start: 2020-08-31 | End: 2020-12-17 | Stop reason: SDUPTHER

## 2020-08-31 NOTE — TELEPHONE ENCOUNTER
----- Message from Adela Ramon sent at 8/31/2020  9:20 AM CDT -----  Refill Glipizide-Metformin 90 day supply. [facility] . Pt's # [phone]

## 2020-09-02 ENCOUNTER — TELEPHONE (OUTPATIENT)
Dept: FAMILY MEDICINE | Facility: CLINIC | Age: 70
End: 2020-09-02

## 2020-09-02 NOTE — TELEPHONE ENCOUNTER
From Remind Me-PSA due 2nd attempt. Spoke to patient that lab is due to recheck prostate. States he will have drawn soon.

## 2020-09-04 LAB — PSA SERPL-MCNC: 1.9 NG/ML

## 2020-09-08 ENCOUNTER — TELEPHONE (OUTPATIENT)
Dept: FAMILY MEDICINE | Facility: CLINIC | Age: 70
End: 2020-09-08

## 2020-09-08 NOTE — TELEPHONE ENCOUNTER
----- Message from Marco Antonio Loyola PA-C sent at 9/7/2020  5:11 PM CDT -----  PSA is within normal limits! Continue as is.

## 2020-10-27 ENCOUNTER — LAB VISIT (OUTPATIENT)
Dept: LAB | Facility: HOSPITAL | Age: 70
End: 2020-10-27
Attending: INTERNAL MEDICINE
Payer: MEDICARE

## 2020-10-27 DIAGNOSIS — M05.79 RHEUMATOID ARTHRITIS INVOLVING MULTIPLE SITES WITH POSITIVE RHEUMATOID FACTOR: ICD-10-CM

## 2020-10-27 DIAGNOSIS — Z79.899 ENCOUNTER FOR LONG-TERM (CURRENT) DRUG USE: ICD-10-CM

## 2020-10-27 LAB
BASOPHILS # BLD AUTO: 0.02 K/UL (ref 0–0.2)
BASOPHILS NFR BLD: 0.2 % (ref 0–1.9)
DIFFERENTIAL METHOD: ABNORMAL
EOSINOPHIL # BLD AUTO: 0.1 K/UL (ref 0–0.5)
EOSINOPHIL NFR BLD: 1 % (ref 0–8)
ERYTHROCYTE [DISTWIDTH] IN BLOOD BY AUTOMATED COUNT: 13.1 % (ref 11.5–14.5)
HCT VFR BLD AUTO: 42.2 % (ref 40–54)
HGB BLD-MCNC: 13.7 G/DL (ref 14–18)
IMM GRANULOCYTES # BLD AUTO: 0.04 K/UL (ref 0–0.04)
IMM GRANULOCYTES NFR BLD AUTO: 0.5 % (ref 0–0.5)
LYMPHOCYTES # BLD AUTO: 1.6 K/UL (ref 1–4.8)
LYMPHOCYTES NFR BLD: 17.7 % (ref 18–48)
MCH RBC QN AUTO: 29.9 PG (ref 27–31)
MCHC RBC AUTO-ENTMCNC: 32.5 G/DL (ref 32–36)
MCV RBC AUTO: 92 FL (ref 82–98)
MONOCYTES # BLD AUTO: 0.7 K/UL (ref 0.3–1)
MONOCYTES NFR BLD: 8 % (ref 4–15)
NEUTROPHILS # BLD AUTO: 6.3 K/UL (ref 1.8–7.7)
NEUTROPHILS NFR BLD: 72.6 % (ref 38–73)
NRBC BLD-RTO: 0 /100 WBC
PLATELET # BLD AUTO: 248 K/UL (ref 150–350)
PMV BLD AUTO: 9.9 FL (ref 9.2–12.9)
RBC # BLD AUTO: 4.58 M/UL (ref 4.6–6.2)
WBC # BLD AUTO: 8.74 K/UL (ref 3.9–12.7)

## 2020-10-27 PROCEDURE — 85025 COMPLETE CBC W/AUTO DIFF WBC: CPT

## 2020-10-27 PROCEDURE — 36415 COLL VENOUS BLD VENIPUNCTURE: CPT

## 2020-11-11 ENCOUNTER — OFFICE VISIT (OUTPATIENT)
Dept: RHEUMATOLOGY | Facility: CLINIC | Age: 70
End: 2020-11-11
Payer: MEDICARE

## 2020-11-11 VITALS
SYSTOLIC BLOOD PRESSURE: 138 MMHG | BODY MASS INDEX: 26.2 KG/M2 | TEMPERATURE: 98 F | DIASTOLIC BLOOD PRESSURE: 71 MMHG | WEIGHT: 167.31 LBS

## 2020-11-11 DIAGNOSIS — Z79.899 ENCOUNTER FOR LONG-TERM (CURRENT) DRUG USE: ICD-10-CM

## 2020-11-11 DIAGNOSIS — M05.79 RHEUMATOID ARTHRITIS INVOLVING MULTIPLE SITES WITH POSITIVE RHEUMATOID FACTOR: Primary | ICD-10-CM

## 2020-11-11 PROCEDURE — 1125F PR PAIN SEVERITY QUANTIFIED, PAIN PRESENT: ICD-10-PCS | Mod: S$GLB,,, | Performed by: INTERNAL MEDICINE

## 2020-11-11 PROCEDURE — 1125F AMNT PAIN NOTED PAIN PRSNT: CPT | Mod: S$GLB,,, | Performed by: INTERNAL MEDICINE

## 2020-11-11 PROCEDURE — 1159F PR MEDICATION LIST DOCUMENTED IN MEDICAL RECORD: ICD-10-PCS | Mod: S$GLB,,, | Performed by: INTERNAL MEDICINE

## 2020-11-11 PROCEDURE — 1101F PT FALLS ASSESS-DOCD LE1/YR: CPT | Mod: S$GLB,,, | Performed by: INTERNAL MEDICINE

## 2020-11-11 PROCEDURE — 1101F PR PT FALLS ASSESS DOC 0-1 FALLS W/OUT INJ PAST YR: ICD-10-PCS | Mod: S$GLB,,, | Performed by: INTERNAL MEDICINE

## 2020-11-11 PROCEDURE — 99213 OFFICE O/P EST LOW 20 MIN: CPT | Mod: S$GLB,,, | Performed by: INTERNAL MEDICINE

## 2020-11-11 PROCEDURE — 1159F MED LIST DOCD IN RCRD: CPT | Mod: S$GLB,,, | Performed by: INTERNAL MEDICINE

## 2020-11-11 PROCEDURE — 99213 PR OFFICE/OUTPT VISIT, EST, LEVL III, 20-29 MIN: ICD-10-PCS | Mod: S$GLB,,, | Performed by: INTERNAL MEDICINE

## 2020-11-11 NOTE — PROGRESS NOTES
[facility] RHEUMATOLOGY           Follow-up visit    Notes dictated to M*Modal. Please forgive any unintended errors.  Subjective:       Patient ID:   NAME: Kyrie Norton Jr. : 1950     70 y.o. male    Referring Doc: No ref. provider found  Other Physicians:    Chief Complaint:  Rheumatoid Arthritis and Chronic inflammatory arthritis      HPI/Interval History:  Patient is doing very well.  He has no complaints of any red, hot, and/or swollen joints.  He has no morning stiffness.    ROS:   GEN:    no fever, night sweats or weight loss  SKIN:   no rashes, bruising, or swelling, no Raynauds, no photosensitivity  HEENT: no changes in vision, no mouth ulcers, no sicca symptoms, no scalp tenderness, no jaw claudication.  CV:      no CP, PND, JUAN, orthopnea, no palpitations  PULM: no SOB, cough, hemoptysis, sputum or pleuritic pain  GI:        no dysphagia, no GERD, no hematemesis, no abdominal pain, nausea, vomiting, constipation, diarrhea, melanotic stools, BRBPR  :      no hematuria, dysuria  NEURO: no paresthesias, headaches, acute visual disturbances  MUSCULOSKELETAL:  As above  PSYCH:   No increased insomnia, no increased anxiety, no increased depression    Past Medical/Surgical History:  Past Medical History:   Diagnosis Date    Diabetes mellitus type II     Hyperlipidemia     Hypertension      History reviewed. No pertinent surgical history.    Allergies:  Review of patient's allergies indicates:   Allergen Reactions    Parafon forte Nausea Only       Social/Family History:  Social History     Socioeconomic History    Marital status:      Spouse name: Not on file    Number of children: Not on file    Years of education: Not on file    Highest education level: Not on file   Occupational History    Not on file   Social Needs    Financial resource strain: Not on file    Food insecurity     Worry: Not on file     Inability: Not on file    Transportation needs     Medical: Not on file      Non-medical: Not on file   Tobacco Use    Smoking status: Never Smoker    Smokeless tobacco: Never Used   Substance and Sexual Activity    Alcohol use: Yes     Comment: occasional    Drug use: No    Sexual activity: Not on file   Lifestyle    Physical activity     Days per week: Not on file     Minutes per session: Not on file    Stress: Not on file   Relationships    Social connections     Talks on phone: Not on file     Gets together: Not on file     Attends Zoroastrian service: Not on file     Active member of club or organization: Not on file     Attends meetings of clubs or organizations: Not on file     Relationship status: Not on file   Other Topics Concern    Not on file   Social History Narrative    Not on file     Family History   Problem Relation Age of Onset    Blindness Neg Hx     Glaucoma Neg Hx     Macular degeneration Neg Hx      FAMILY HISTORY: negative for Connective Tissue Disease      Medications:    Current Outpatient Medications:     aspirin (ECOTRIN) 81 MG EC tablet, Take 81 mg by mouth once daily., Disp: , Rfl:     atorvastatin (LIPITOR) 40 MG tablet, Take 1 tablet (40 mg total) by mouth once daily., Disp: 90 tablet, Rfl: 4    blood sugar diagnostic (ACCU-CHEK CJ PLUS TEST STRP) Strp, 1 strip by Subdermal route Daily., Disp: 100 strip, Rfl: 4    coenzyme Q10 (CO Q-10) 10 mg capsule, Take 10 mg by mouth once daily., Disp: , Rfl:     fish oil-omega-3 fatty acids 300-1,000 mg capsule, Take 2 g by mouth once daily., Disp: , Rfl:     folic acid (FOLVITE) 1 MG tablet, TAKE 1 TABLET BY MOUTH EVERY DAY, Disp: 90 tablet, Rfl: 3    glipizide-metformin (METAGLIP) 5-500 mg per tablet, 1 tablet in the am, 2 tablets in the pm., Disp: 270 tablet, Rfl: 1    hydrOXYchloroQUINE (PLAQUENIL) 200 mg tablet, TAKE 1 TABLET BY MOUTH EVERY 12 HOURS, Disp: 180 tablet, Rfl: 1    ibuprofen (ADVIL,MOTRIN) 800 MG tablet, Take 1 tablet (800 mg total) by mouth every 6 (six) hours as needed for Pain.,  Disp: 20 tablet, Rfl: 0    lancets (ACCU-CHEK FASTCLIX) Misc, 1 Device by Misc.(Non-Drug; Combo Route) route Daily., Disp: 100 each, Rfl: 4    methotrexate 2.5 MG Tab, TAKE 5 TABLETS BY MOUTH EVERY 7 DAYS, Disp: 60 tablet, Rfl: 1    MILK THISTLE ORAL, Take 250 mg by mouth once daily. , Disp: , Rfl:     predniSONE (DELTASONE) 2.5 MG tablet, TAKE 1 TABLET BY MOUTH TWICE A DAY, Disp: 60 tablet, Rfl: 5    sildenafil (VIAGRA) 100 MG tablet, Take 1 tablet (100 mg total) by mouth daily as needed for Erectile Dysfunction., Disp: 4 tablet, Rfl: 4    tadalafil (CIALIS) 5 MG tablet, Take 1 tablet (5 mg total) by mouth daily as needed for Erectile Dysfunction., Disp: 30 tablet, Rfl: 6      Objective:     Vitals:  Blood pressure 138/71, temperature 98.2 °F (36.8 °C), weight 75.9 kg (167 lb 4.8 oz).    Physical Examination:   GEN: wn/wd male in no apparent distress  SKIN: no rashes, no sclerodactyly, no Raynaud's, no periungual erythema, no digital tip ulcerations, no nailbed pitting  HEAD: no alopecia, no scalp tenderness, no temporal artery tenderness or induration.  EYES: no pallor, no icterus, PERRLA  ENT:  no thrush, no mucosal dryness or ulcerations, adequate oral hygiene & dentition.  NECK: supple x 6, no masses, no thyromegaly, no lymphadenopathy.  CV:   S1 and S2 regular, no murmurs, gallop or rubs  CHEST: Normal respiratory effort;  normal breath sounds/no adventitious sounds. No signs of consolidation.  ABD: non-tender and non-distended; soft; normal bowel sounds; no rebound/guarding or tenderness. No hepatosplenomegaly.  Musculoskeletal:  No evidence of active synovitis.  No progressive deformity  EXTREM: no clubbing, cyanosis or edema. normal pulses.  NEURO:  grossly intact; motor/sensory WNL; no tremors  PSYCH:  normal mood, affect and behavior    Labs:   Lab Results   Component Value Date    WBC 8.74 10/27/2020    HGB 13.7 (L) 10/27/2020    HCT 42.2 10/27/2020    MCV 92 10/27/2020     10/27/2020    CMP@  Sodium   Date Value Ref Range Status   03/05/2020 142 135 - 146 mmol/L Final   04/24/2019 139 134 - 144 mmol/L      Potassium   Date Value Ref Range Status   03/05/2020 5.3 3.5 - 5.3 mmol/L Final     Chloride   Date Value Ref Range Status   03/05/2020 101 98 - 110 mmol/L Final   04/24/2019 102 98 - 110 mmol/L      CO2   Date Value Ref Range Status   03/05/2020 33 (H) 20 - 32 mmol/L Final     Glucose   Date Value Ref Range Status   03/05/2020 96 65 - 99 mg/dL Final     Comment:                   Fasting reference interval        04/24/2019 85 70 - 99 mg/dL      BUN   Date Value Ref Range Status   03/05/2020 12 7 - 25 mg/dL Final     Creatinine   Date Value Ref Range Status   03/05/2020 0.92 0.70 - 1.25 mg/dL Final     Comment:     For patients >49 years of age, the reference limit  for Creatinine is approximately 13% higher for people  identified as -American.        04/24/2019 0.82 0.60 - 1.40 mg/dL      Calcium   Date Value Ref Range Status   03/05/2020 10.1 8.6 - 10.3 mg/dL Final     Total Protein   Date Value Ref Range Status   03/05/2020 7.2 6.1 - 8.1 g/dL Final     Albumin   Date Value Ref Range Status   03/05/2020 4.7 3.6 - 5.1 g/dL Final   04/24/2019 4.1 3.1 - 4.7 g/dL      Total Bilirubin   Date Value Ref Range Status   03/05/2020 0.9 0.2 - 1.2 mg/dL Final     Alkaline Phosphatase   Date Value Ref Range Status   03/05/2020 51 35 - 144 U/L Final     AST   Date Value Ref Range Status   03/05/2020 17 10 - 35 U/L Final     ALT   Date Value Ref Range Status   03/05/2020 25 9 - 46 U/L Final     CRP   Date Value Ref Range Status   08/06/2019 0.03 0.00 - 0.75 mg/dL Final     Rheumatoid Factor   Date Value Ref Range Status   09/26/2017 <10.0 0.0 - 13.9 IU/mL      Comment:     Performed at: [facility], [facility] [id number] [place] [address], [place], [id number] Ragland MD, Phone:  [phone]       Radiology/Diagnostic Studies:    None    Assessment/Discussion/Plan:   70 y.o. male with chronic  inflammatory arthritis-excellent control on methotrexate 12.5 mg weekly, Plaquenil 400 mg daily, and prednisone 2.5 mg twice daily    PLAN:  I will continue his medication without change.  Routine blood testing will be performed in 3 months.    RTC:  I will see him back in 4 months.    Electronically signed by Calderon Barrett MD

## 2020-12-17 ENCOUNTER — OFFICE VISIT (OUTPATIENT)
Dept: FAMILY MEDICINE | Facility: CLINIC | Age: 70
End: 2020-12-17
Payer: MEDICARE

## 2020-12-17 VITALS
HEIGHT: 67 IN | DIASTOLIC BLOOD PRESSURE: 62 MMHG | SYSTOLIC BLOOD PRESSURE: 116 MMHG | WEIGHT: 169 LBS | HEART RATE: 68 BPM | BODY MASS INDEX: 26.53 KG/M2

## 2020-12-17 DIAGNOSIS — Z23 NEED FOR VACCINATION WITH 13-POLYVALENT PNEUMOCOCCAL CONJUGATE VACCINE: ICD-10-CM

## 2020-12-17 DIAGNOSIS — Z23 NEED FOR INFLUENZA VACCINATION: ICD-10-CM

## 2020-12-17 DIAGNOSIS — E11.69 HYPERLIPIDEMIA ASSOCIATED WITH TYPE 2 DIABETES MELLITUS: ICD-10-CM

## 2020-12-17 DIAGNOSIS — E11.9 TYPE 2 DIABETES MELLITUS WITHOUT RETINOPATHY: Primary | ICD-10-CM

## 2020-12-17 DIAGNOSIS — E78.5 HYPERLIPIDEMIA ASSOCIATED WITH TYPE 2 DIABETES MELLITUS: ICD-10-CM

## 2020-12-17 DIAGNOSIS — E11.9 TYPE 2 DIABETES MELLITUS WITHOUT RETINOPATHY: ICD-10-CM

## 2020-12-17 LAB — HBA1C MFR BLD: 6.1 %

## 2020-12-17 PROCEDURE — 90662 IIV NO PRSV INCREASED AG IM: CPT | Mod: S$GLB,,, | Performed by: PHYSICIAN ASSISTANT

## 2020-12-17 PROCEDURE — G0008 FLU VACCINE - QUADRIVALENT - HIGH DOSE (65+) PRESERVATIVE FREE IM: ICD-10-PCS | Mod: S$GLB,,, | Performed by: PHYSICIAN ASSISTANT

## 2020-12-17 PROCEDURE — 90670 PNEUMOCOCCAL CONJUGATE VACCINE 13-VALENT LESS THAN 5YO & GREATER THAN: ICD-10-PCS | Mod: S$GLB,,, | Performed by: PHYSICIAN ASSISTANT

## 2020-12-17 PROCEDURE — 99214 PR OFFICE/OUTPT VISIT, EST, LEVL IV, 30-39 MIN: ICD-10-PCS | Mod: 25,S$GLB,, | Performed by: PHYSICIAN ASSISTANT

## 2020-12-17 PROCEDURE — 3074F SYST BP LT 130 MM HG: CPT | Mod: S$GLB,,, | Performed by: PHYSICIAN ASSISTANT

## 2020-12-17 PROCEDURE — 3044F PR MOST RECENT HEMOGLOBIN A1C LEVEL <7.0%: ICD-10-PCS | Mod: S$GLB,,, | Performed by: PHYSICIAN ASSISTANT

## 2020-12-17 PROCEDURE — 1159F MED LIST DOCD IN RCRD: CPT | Mod: S$GLB,,, | Performed by: PHYSICIAN ASSISTANT

## 2020-12-17 PROCEDURE — 3008F BODY MASS INDEX DOCD: CPT | Mod: S$GLB,,, | Performed by: PHYSICIAN ASSISTANT

## 2020-12-17 PROCEDURE — 3008F PR BODY MASS INDEX (BMI) DOCUMENTED: ICD-10-PCS | Mod: S$GLB,,, | Performed by: PHYSICIAN ASSISTANT

## 2020-12-17 PROCEDURE — 3078F PR MOST RECENT DIASTOLIC BLOOD PRESSURE < 80 MM HG: ICD-10-PCS | Mod: S$GLB,,, | Performed by: PHYSICIAN ASSISTANT

## 2020-12-17 PROCEDURE — 90662 FLU VACCINE - QUADRIVALENT - HIGH DOSE (65+) PRESERVATIVE FREE IM: ICD-10-PCS | Mod: S$GLB,,, | Performed by: PHYSICIAN ASSISTANT

## 2020-12-17 PROCEDURE — 3078F DIAST BP <80 MM HG: CPT | Mod: S$GLB,,, | Performed by: PHYSICIAN ASSISTANT

## 2020-12-17 PROCEDURE — 90670 PCV13 VACCINE IM: CPT | Mod: S$GLB,,, | Performed by: PHYSICIAN ASSISTANT

## 2020-12-17 PROCEDURE — G0009 PNEUMOCOCCAL CONJUGATE VACCINE 13-VALENT LESS THAN 5YO & GREATER THAN: ICD-10-PCS | Mod: S$GLB,,, | Performed by: PHYSICIAN ASSISTANT

## 2020-12-17 PROCEDURE — G0008 ADMIN INFLUENZA VIRUS VAC: HCPCS | Mod: S$GLB,,, | Performed by: PHYSICIAN ASSISTANT

## 2020-12-17 PROCEDURE — 99214 OFFICE O/P EST MOD 30 MIN: CPT | Mod: 25,S$GLB,, | Performed by: PHYSICIAN ASSISTANT

## 2020-12-17 PROCEDURE — 83036 POCT HEMOGLOBIN A1C: ICD-10-PCS | Mod: QW,,, | Performed by: PHYSICIAN ASSISTANT

## 2020-12-17 PROCEDURE — 3044F HG A1C LEVEL LT 7.0%: CPT | Mod: S$GLB,,, | Performed by: PHYSICIAN ASSISTANT

## 2020-12-17 PROCEDURE — 83036 HEMOGLOBIN GLYCOSYLATED A1C: CPT | Mod: QW,,, | Performed by: PHYSICIAN ASSISTANT

## 2020-12-17 PROCEDURE — 3074F PR MOST RECENT SYSTOLIC BLOOD PRESSURE < 130 MM HG: ICD-10-PCS | Mod: S$GLB,,, | Performed by: PHYSICIAN ASSISTANT

## 2020-12-17 PROCEDURE — 1159F PR MEDICATION LIST DOCUMENTED IN MEDICAL RECORD: ICD-10-PCS | Mod: S$GLB,,, | Performed by: PHYSICIAN ASSISTANT

## 2020-12-17 PROCEDURE — G0009 ADMIN PNEUMOCOCCAL VACCINE: HCPCS | Mod: S$GLB,,, | Performed by: PHYSICIAN ASSISTANT

## 2020-12-17 RX ORDER — GLIPIZIDE AND METFORMIN HCL 5; 500 MG/1; MG/1
TABLET, FILM COATED ORAL
Qty: 270 TABLET | Refills: 1 | Status: SHIPPED | OUTPATIENT
Start: 2020-12-17 | End: 2021-07-26 | Stop reason: SDUPTHER

## 2020-12-17 RX ORDER — ATORVASTATIN CALCIUM 40 MG/1
40 TABLET, FILM COATED ORAL DAILY
Qty: 90 TABLET | Refills: 4 | Status: SHIPPED | OUTPATIENT
Start: 2020-12-17 | End: 2022-01-27 | Stop reason: SDUPTHER

## 2020-12-17 NOTE — PATIENT INSTRUCTIONS
Diet: Diabetes  Food is an important tool that you can use to control diabetes and stay healthy. Eating well-balanced meals in the correct amounts will help you control your blood glucose levels and prevent low blood sugar reactions. It will also help you reduce the health risks of diabetes. There is no one specific diet that is right for everyone with diabetes. But there are general guidelines to follow. A registered dietitian (RD) will create a tailored diet approach thats just right for you. He or she will also help you plan healthy meals and snacks. If you have any questions, call your dietitian for advice.     Guidelines for success  Talk with your healthcare provider before starting a diabetes diet or weight loss program. If you haven't talked with a dietitian yet, ask your provider for a referral. The following guidelines can help you succeed:  · Select foods from the 6 food groups below. Your dietitian will help you find food choices within each group. He or she will also show you serving sizes and how many servings you can have at each meal.  ¨ Grains, beans, and starchy vegetables  ¨ Vegetables  ¨ Fruit  ¨ Milk or yogurt  ¨ Meat, poultry, fish, or tofu  ¨ Healthy fats  · Check your blood sugar levels as directed by your provider. Take any medicine as prescribed by your provider.  · Learn to read food labels and pick the right portion sizes.  · Eat only the amount of food in your meal plan. Eat about the same amount of food at regular times each day. Dont skip meals. Eat meals 4 to 5 hours apart, with snacks in between.  · Limit alcohol. It raises blood sugar levels. Drink water or calorie-free diet drinks that use safe sweeteners.  · Eat less fat to help lower your risk of heart disease. Use nonfat or low-fat dairy products and lean meats. Avoid fried foods. Use cooking oils that are unsaturated, such as olive, canola, or peanut oil.  · Talk with your dietitian about safe sugar substitutes.  · Avoid  added salt. It can contribute to high blood pressure, which can cause heart disease. People with diabetes already have a risk of high blood pressure and heart disease.  · Stay at a healthy weight. If you need to lose weight, cut down on your portion sizes. But dont skip meals. Exercise is an important part of any weight management program. Talk with your provider about an exercise program thats right for you.  · For more information about the best diet plan for you, talk with a registered dietitian (RD). To find an RD in your area, contact:  ¨ Academy of Nutrition and Dietetics www.eatright.org  ¨ The American Diabetes Association 963-530-2664 www.diabetes.org  Date Last Reviewed: 8/1/2016 © 2000-2017 [employer]. [address] [place] [address], [place] [address]. All rights reserved. This information is not intended as a substitute for professional medical care. Always follow your healthcare professional's instructions.

## 2020-12-17 NOTE — PROGRESS NOTES
SUBJECTIVE:    Patient ID: Kyrie Norton Jr. is a 70 y.o. male.    Chief Complaint: Diabetes (brought bottles, Eye exam pt will sched, PNA and Flu wants// SW)    71 y/o male who presents for regular checkup. He is doing well with no complaints at this time. Hs of DM. At his last visit in June A1C was 6.4%, today it is 6.1 . He is managed on glipizide/metformin. Dr. Barrett managed RA. On plaquenil/MTX. He is up-to-date on PSA. He completed cologuard about a year ago.      Lab Visit on 10/27/2020   Component Date Value Ref Range Status    WBC 10/27/2020 8.74  3.90 - 12.70 K/uL Final    RBC 10/27/2020 4.58* 4.60 - 6.20 M/uL Final    Hemoglobin 10/27/2020 13.7* 14.0 - 18.0 g/dL Final    Hematocrit 10/27/2020 42.2  40.0 - 54.0 % Final    MCV 10/27/2020 92  82 - 98 fL Final    MCH 10/27/2020 29.9  27.0 - 31.0 pg Final    MCHC 10/27/2020 32.5  32.0 - 36.0 g/dL Final    RDW 10/27/2020 13.1  11.5 - 14.5 % Final    Platelets 10/27/2020 248  150 - 350 K/uL Final    MPV 10/27/2020 9.9  9.2 - 12.9 fL Final    Immature Granulocytes 10/27/2020 0.5  0.0 - 0.5 % Final    Gran # (ANC) 10/27/2020 6.3  1.8 - 7.7 K/uL Final    Immature Grans (Abs) 10/27/2020 0.04  0.00 - 0.04 K/uL Final    Lymph # 10/27/2020 1.6  1.0 - 4.8 K/uL Final    Mono # 10/27/2020 0.7  0.3 - 1.0 K/uL Final    Eos # 10/27/2020 0.1  0.0 - 0.5 K/uL Final    Baso # 10/27/2020 0.02  0.00 - 0.20 K/uL Final    nRBC 10/27/2020 0  0 /100 WBC Final    Gran % 10/27/2020 72.6  38.0 - 73.0 % Final    Lymph % 10/27/2020 17.7* 18.0 - 48.0 % Final    Mono % 10/27/2020 8.0  4.0 - 15.0 % Final    Eosinophil % 10/27/2020 1.0  0.0 - 8.0 % Final    Basophil % 10/27/2020 0.2  0.0 - 1.9 % Final    Differential Method 10/27/2020 Automated   Final   Telephone on 08/19/2020   Component Date Value Ref Range Status    PROSTATE SPECIFIC ANTIGEN, SCR - Q* 09/03/2020 1.9  < OR = 4.0 ng/mL Final   Lab Visit on 06/29/2020   Component Date Value Ref Range Status     WBC 06/29/2020 6.85  3.90 - 12.70 K/uL Final    RBC 06/29/2020 4.70  4.60 - 6.20 M/uL Final    Hemoglobin 06/29/2020 14.2  14.0 - 18.0 g/dL Final    Hematocrit 06/29/2020 43.0  40.0 - 54.0 % Final    MCV 06/29/2020 92  82 - 98 fL Final    MCH 06/29/2020 30.2  27.0 - 31.0 pg Final    MCHC 06/29/2020 33.0  32.0 - 36.0 g/dL Final    RDW 06/29/2020 13.2  11.5 - 14.5 % Final    Platelets 06/29/2020 246  150 - 350 K/uL Final    MPV 06/29/2020 9.8  9.2 - 12.9 fL Final    Immature Granulocytes 06/29/2020 0.4  0.0 - 0.5 % Final    Gran # (ANC) 06/29/2020 3.5  1.8 - 7.7 K/uL Final    Immature Grans (Abs) 06/29/2020 0.03  0.00 - 0.04 K/uL Final    Lymph # 06/29/2020 2.6  1.0 - 4.8 K/uL Final    Mono # 06/29/2020 0.6  0.3 - 1.0 K/uL Final    Eos # 06/29/2020 0.1  0.0 - 0.5 K/uL Final    Baso # 06/29/2020 0.03  0.00 - 0.20 K/uL Final    nRBC 06/29/2020 0  0 /100 WBC Final    Gran % 06/29/2020 50.6  38.0 - 73.0 % Final    Lymph % 06/29/2020 38.2  18.0 - 48.0 % Final    Mono % 06/29/2020 8.5  4.0 - 15.0 % Final    Eosinophil % 06/29/2020 1.9  0.0 - 8.0 % Final    Basophil % 06/29/2020 0.4  0.0 - 1.9 % Final    Differential Method 06/29/2020 Automated   Final       Past Medical History:   Diagnosis Date    Diabetes mellitus type II     Hyperlipidemia     Hypertension      History reviewed. No pertinent surgical history.  Family History   Problem Relation Age of Onset    Blindness Neg Hx     Glaucoma Neg Hx     Macular degeneration Neg Hx        Marital Status:   Alcohol History:  reports current alcohol use.  Tobacco History:  reports that he has never smoked. He has never used smokeless tobacco.  Drug History:  reports no history of drug use.    Review of patient's allergies indicates:   Allergen Reactions    Parafon forte Nausea Only       Current Outpatient Medications:     aspirin (ECOTRIN) 81 MG EC tablet, Take 81 mg by mouth once daily., Disp: , Rfl:     atorvastatin (LIPITOR) 40 MG  "tablet, Take 1 tablet (40 mg total) by mouth once daily., Disp: 90 tablet, Rfl: 4    blood sugar diagnostic (ACCU-CHEK CJ PLUS TEST STRP) Strp, 1 strip by Subdermal route Daily., Disp: 100 strip, Rfl: 4    coenzyme Q10 (CO Q-10) 10 mg capsule, Take 10 mg by mouth once daily., Disp: , Rfl:     fish oil-omega-3 fatty acids 300-1,000 mg capsule, Take 2 g by mouth once daily., Disp: , Rfl:     folic acid (FOLVITE) 1 MG tablet, TAKE 1 TABLET BY MOUTH EVERY DAY, Disp: 90 tablet, Rfl: 3    glipizide-metformin (METAGLIP) 5-500 mg per tablet, 1 tablet in the am, 2 tablets in the pm., Disp: 270 tablet, Rfl: 1    hydrOXYchloroQUINE (PLAQUENIL) 200 mg tablet, TAKE 1 TABLET BY MOUTH EVERY 12 HOURS, Disp: 180 tablet, Rfl: 1    ibuprofen (ADVIL,MOTRIN) 800 MG tablet, Take 1 tablet (800 mg total) by mouth every 6 (six) hours as needed for Pain., Disp: 20 tablet, Rfl: 0    lancets (ACCU-CHEK FASTCLIX) Misc, 1 Device by Misc.(Non-Drug; Combo Route) route Daily., Disp: 100 each, Rfl: 4    methotrexate 2.5 MG Tab, TAKE 5 TABLETS BY MOUTH EVERY 7 DAYS, Disp: 60 tablet, Rfl: 1    MILK THISTLE ORAL, Take 250 mg by mouth once daily. , Disp: , Rfl:     predniSONE (DELTASONE) 2.5 MG tablet, TAKE 1 TABLET BY MOUTH TWICE A DAY, Disp: 60 tablet, Rfl: 5    Review of Systems   Constitutional: Negative for activity change, fatigue, fever and unexpected weight change.   HENT: Negative for congestion.    Respiratory: Negative for apnea, cough, chest tightness and shortness of breath.    Cardiovascular: Negative for chest pain and palpitations.   Gastrointestinal: Negative for abdominal distention and abdominal pain.   Genitourinary: Negative for difficulty urinating and dysuria.   Musculoskeletal: Negative for arthralgias and back pain.   Neurological: Negative for dizziness and weakness.          Objective:      Vitals:    12/17/20 0716   BP: 116/62   Pulse: 68   Weight: 76.7 kg (169 lb)   Height: 5' 7" (1.702 m)     Physical " Exam  Constitutional:       General: He is not in acute distress.     Appearance: He is well-developed.   HENT:      Head: Normocephalic and atraumatic.   Eyes:      Pupils: Pupils are equal, round, and reactive to light.   Neck:      Musculoskeletal: Normal range of motion and neck supple.      Thyroid: No thyromegaly.   Cardiovascular:      Rate and Rhythm: Normal rate and regular rhythm.      Heart sounds: Normal heart sounds.   Pulmonary:      Effort: Pulmonary effort is normal.      Breath sounds: Normal breath sounds.   Abdominal:      General: Bowel sounds are normal. There is no distension.      Palpations: Abdomen is soft.      Tenderness: There is no abdominal tenderness.   Musculoskeletal: Normal range of motion.   Skin:     General: Skin is warm and dry.      Findings: No erythema or rash.   Neurological:      Mental Status: He is alert and oriented to person, place, and time.      Cranial Nerves: No cranial nerve deficit.           Assessment:       1. Type 2 diabetes mellitus without retinopathy    2. Need for vaccination with 13-polyvalent pneumococcal conjugate vaccine    3. Need for influenza vaccination    4. Hyperlipidemia associated with type 2 diabetes mellitus    5. Type 2 diabetes mellitus without retinopathy         Plan:       Type 2 diabetes mellitus without retinopathy  -     Hemoglobin A1C, POCT  -     glipizide-metformin (METAGLIP) 5-500 mg per tablet; 1 tablet in the am, 2 tablets in the pm.  Dispense: 270 tablet; Refill: 1    Need for vaccination with 13-polyvalent pneumococcal conjugate vaccine  -     Pneumococcal Conjugate Vaccine (13 Valent) (IM)    Need for influenza vaccination  -     Influenza - Quadrivalent - High Dose (65+) (PF) (IM)    Hyperlipidemia associated with type 2 diabetes mellitus  Comments:  stable, refills today.  Orders:  -     atorvastatin (LIPITOR) 40 MG tablet; Take 1 tablet (40 mg total) by mouth once daily.  Dispense: 90 tablet; Refill: 4    Type 2 diabetes  mellitus without retinopathy  Comments:  A1c at 6.1 . Continue as is  Orders:  -     Hemoglobin A1C, POCT  -     glipizide-metformin (METAGLIP) 5-500 mg per tablet; 1 tablet in the am, 2 tablets in the pm.  Dispense: 270 tablet; Refill: 1      Follow up in about 6 months (around 6/17/2021) for Diabetic Check-Up.        12/17/2020 Marco Antonio Loyola PA-C

## 2021-03-03 ENCOUNTER — OFFICE VISIT (OUTPATIENT)
Dept: RHEUMATOLOGY | Facility: CLINIC | Age: 71
End: 2021-03-03
Payer: MEDICARE

## 2021-03-03 VITALS
TEMPERATURE: 98 F | DIASTOLIC BLOOD PRESSURE: 76 MMHG | WEIGHT: 169 LBS | BODY MASS INDEX: 26.47 KG/M2 | SYSTOLIC BLOOD PRESSURE: 128 MMHG

## 2021-03-03 DIAGNOSIS — M05.79 RHEUMATOID ARTHRITIS INVOLVING MULTIPLE SITES WITH POSITIVE RHEUMATOID FACTOR: Primary | ICD-10-CM

## 2021-03-03 DIAGNOSIS — Z79.899 ENCOUNTER FOR LONG-TERM (CURRENT) DRUG USE: ICD-10-CM

## 2021-03-03 PROCEDURE — 3288F FALL RISK ASSESSMENT DOCD: CPT | Mod: S$GLB,,, | Performed by: INTERNAL MEDICINE

## 2021-03-03 PROCEDURE — 1101F PR PT FALLS ASSESS DOC 0-1 FALLS W/OUT INJ PAST YR: ICD-10-PCS | Mod: S$GLB,,, | Performed by: INTERNAL MEDICINE

## 2021-03-03 PROCEDURE — 1159F PR MEDICATION LIST DOCUMENTED IN MEDICAL RECORD: ICD-10-PCS | Mod: S$GLB,,, | Performed by: INTERNAL MEDICINE

## 2021-03-03 PROCEDURE — 1101F PT FALLS ASSESS-DOCD LE1/YR: CPT | Mod: S$GLB,,, | Performed by: INTERNAL MEDICINE

## 2021-03-03 PROCEDURE — 1125F AMNT PAIN NOTED PAIN PRSNT: CPT | Mod: S$GLB,,, | Performed by: INTERNAL MEDICINE

## 2021-03-03 PROCEDURE — 99213 PR OFFICE/OUTPT VISIT, EST, LEVL III, 20-29 MIN: ICD-10-PCS | Mod: S$GLB,,, | Performed by: INTERNAL MEDICINE

## 2021-03-03 PROCEDURE — 1125F PR PAIN SEVERITY QUANTIFIED, PAIN PRESENT: ICD-10-PCS | Mod: S$GLB,,, | Performed by: INTERNAL MEDICINE

## 2021-03-03 PROCEDURE — 1159F MED LIST DOCD IN RCRD: CPT | Mod: S$GLB,,, | Performed by: INTERNAL MEDICINE

## 2021-03-03 PROCEDURE — 3288F PR FALLS RISK ASSESSMENT DOCUMENTED: ICD-10-PCS | Mod: S$GLB,,, | Performed by: INTERNAL MEDICINE

## 2021-03-03 PROCEDURE — 99213 OFFICE O/P EST LOW 20 MIN: CPT | Mod: S$GLB,,, | Performed by: INTERNAL MEDICINE

## 2021-05-04 ENCOUNTER — LAB VISIT (OUTPATIENT)
Dept: LAB | Facility: HOSPITAL | Age: 71
End: 2021-05-04
Attending: INTERNAL MEDICINE
Payer: MEDICARE

## 2021-05-04 DIAGNOSIS — M05.79 RHEUMATOID ARTHRITIS INVOLVING MULTIPLE SITES WITH POSITIVE RHEUMATOID FACTOR: ICD-10-CM

## 2021-05-04 DIAGNOSIS — Z79.899 ENCOUNTER FOR LONG-TERM (CURRENT) DRUG USE: ICD-10-CM

## 2021-05-04 LAB
BASOPHILS # BLD AUTO: 0.02 K/UL (ref 0–0.2)
BASOPHILS NFR BLD: 0.3 % (ref 0–1.9)
DIFFERENTIAL METHOD: ABNORMAL
EOSINOPHIL # BLD AUTO: 0.1 K/UL (ref 0–0.5)
EOSINOPHIL NFR BLD: 1.4 % (ref 0–8)
ERYTHROCYTE [DISTWIDTH] IN BLOOD BY AUTOMATED COUNT: 13.3 % (ref 11.5–14.5)
HCT VFR BLD AUTO: 42.3 % (ref 40–54)
HGB BLD-MCNC: 13.7 G/DL (ref 14–18)
IMM GRANULOCYTES # BLD AUTO: 0.03 K/UL (ref 0–0.04)
IMM GRANULOCYTES NFR BLD AUTO: 0.4 % (ref 0–0.5)
LYMPHOCYTES # BLD AUTO: 1.6 K/UL (ref 1–4.8)
LYMPHOCYTES NFR BLD: 23.2 % (ref 18–48)
MCH RBC QN AUTO: 29.9 PG (ref 27–31)
MCHC RBC AUTO-ENTMCNC: 32.4 G/DL (ref 32–36)
MCV RBC AUTO: 92 FL (ref 82–98)
MONOCYTES # BLD AUTO: 0.7 K/UL (ref 0.3–1)
MONOCYTES NFR BLD: 9.5 % (ref 4–15)
NEUTROPHILS # BLD AUTO: 4.5 K/UL (ref 1.8–7.7)
NEUTROPHILS NFR BLD: 65.2 % (ref 38–73)
NRBC BLD-RTO: 0 /100 WBC
PLATELET # BLD AUTO: 258 K/UL (ref 150–450)
PMV BLD AUTO: 10.2 FL (ref 9.2–12.9)
RBC # BLD AUTO: 4.58 M/UL (ref 4.6–6.2)
WBC # BLD AUTO: 6.97 K/UL (ref 3.9–12.7)

## 2021-05-04 PROCEDURE — 85025 COMPLETE CBC W/AUTO DIFF WBC: CPT | Performed by: INTERNAL MEDICINE

## 2021-05-04 PROCEDURE — 36415 COLL VENOUS BLD VENIPUNCTURE: CPT | Performed by: INTERNAL MEDICINE

## 2021-06-03 ENCOUNTER — TELEPHONE (OUTPATIENT)
Dept: FAMILY MEDICINE | Facility: CLINIC | Age: 71
End: 2021-06-03

## 2021-06-03 DIAGNOSIS — Z00.00 ROUTINE GENERAL MEDICAL EXAMINATION AT A HEALTH CARE FACILITY: ICD-10-CM

## 2021-06-03 DIAGNOSIS — E11.9 TYPE 2 DIABETES MELLITUS WITHOUT RETINOPATHY: ICD-10-CM

## 2021-06-03 DIAGNOSIS — Z79.899 ENCOUNTER FOR LONG-TERM (CURRENT) USE OF OTHER MEDICATIONS: Primary | ICD-10-CM

## 2021-06-08 LAB
ALBUMIN SERPL-MCNC: 4.7 G/DL (ref 3.6–5.1)
ALBUMIN/CREAT UR: NORMAL MCG/MG CREAT
ALBUMIN/GLOB SERPL: 1.8 (CALC) (ref 1–2.5)
ALP SERPL-CCNC: 46 U/L (ref 35–144)
ALT SERPL-CCNC: 19 U/L (ref 9–46)
APPEARANCE UR: CLEAR
AST SERPL-CCNC: 15 U/L (ref 10–35)
BACTERIA #/AREA URNS HPF: NORMAL /HPF
BACTERIA UR CULT: NORMAL
BASOPHILS # BLD AUTO: 13 CELLS/UL (ref 0–200)
BASOPHILS NFR BLD AUTO: 0.2 %
BILIRUB SERPL-MCNC: 0.9 MG/DL (ref 0.2–1.2)
BILIRUB UR QL STRIP: NEGATIVE
BUN SERPL-MCNC: 13 MG/DL (ref 7–25)
BUN/CREAT SERPL: ABNORMAL (CALC) (ref 6–22)
CALCIUM SERPL-MCNC: 10.6 MG/DL (ref 8.6–10.3)
CHLORIDE SERPL-SCNC: 102 MMOL/L (ref 98–110)
CHOLEST SERPL-MCNC: 155 MG/DL
CHOLEST/HDLC SERPL: 3.2 (CALC)
CO2 SERPL-SCNC: 32 MMOL/L (ref 20–32)
COLOR UR: YELLOW
CREAT SERPL-MCNC: 0.83 MG/DL (ref 0.7–1.18)
CREAT UR-MCNC: 50 MG/DL (ref 20–320)
EOSINOPHIL # BLD AUTO: 73 CELLS/UL (ref 15–500)
EOSINOPHIL NFR BLD AUTO: 1.1 %
ERYTHROCYTE [DISTWIDTH] IN BLOOD BY AUTOMATED COUNT: 13.7 % (ref 11–15)
GLOBULIN SER CALC-MCNC: 2.6 G/DL (CALC) (ref 1.9–3.7)
GLUCOSE SERPL-MCNC: 78 MG/DL (ref 65–99)
GLUCOSE UR QL STRIP: NEGATIVE
HBA1C MFR BLD: 5.7 % OF TOTAL HGB
HCT VFR BLD AUTO: 44.9 % (ref 38.5–50)
HDLC SERPL-MCNC: 48 MG/DL
HGB BLD-MCNC: 15 G/DL (ref 13.2–17.1)
HGB UR QL STRIP: NEGATIVE
HYALINE CASTS #/AREA URNS LPF: NORMAL /LPF
KETONES UR QL STRIP: NEGATIVE
LDLC SERPL CALC-MCNC: 86 MG/DL (CALC)
LEUKOCYTE ESTERASE UR QL STRIP: NEGATIVE
LYMPHOCYTES # BLD AUTO: 1584 CELLS/UL (ref 850–3900)
LYMPHOCYTES NFR BLD AUTO: 24 %
MCH RBC QN AUTO: 29.8 PG (ref 27–33)
MCHC RBC AUTO-ENTMCNC: 33.4 G/DL (ref 32–36)
MCV RBC AUTO: 89.3 FL (ref 80–100)
MICROALBUMIN UR-MCNC: <0.2 MG/DL
MONOCYTES # BLD AUTO: 515 CELLS/UL (ref 200–950)
MONOCYTES NFR BLD AUTO: 7.8 %
NEUTROPHILS # BLD AUTO: 4415 CELLS/UL (ref 1500–7800)
NEUTROPHILS NFR BLD AUTO: 66.9 %
NITRITE UR QL STRIP: NEGATIVE
NONHDLC SERPL-MCNC: 107 MG/DL (CALC)
PH UR STRIP: 6 [PH] (ref 5–8)
PLATELET # BLD AUTO: 264 THOUSAND/UL (ref 140–400)
PMV BLD REES-ECKER: 10 FL (ref 7.5–12.5)
POTASSIUM SERPL-SCNC: 5 MMOL/L (ref 3.5–5.3)
PROT SERPL-MCNC: 7.3 G/DL (ref 6.1–8.1)
PROT UR QL STRIP: NEGATIVE
RBC # BLD AUTO: 5.03 MILLION/UL (ref 4.2–5.8)
RBC #/AREA URNS HPF: NORMAL /HPF
SODIUM SERPL-SCNC: 140 MMOL/L (ref 135–146)
SP GR UR STRIP: 1.01 (ref 1–1.03)
SQUAMOUS #/AREA URNS HPF: NORMAL /HPF
TRIGL SERPL-MCNC: 117 MG/DL
TSH SERPL-ACNC: 2 MIU/L (ref 0.4–4.5)
WBC # BLD AUTO: 6.6 THOUSAND/UL (ref 3.8–10.8)
WBC #/AREA URNS HPF: NORMAL /HPF

## 2021-07-06 ENCOUNTER — OFFICE VISIT (OUTPATIENT)
Dept: RHEUMATOLOGY | Facility: CLINIC | Age: 71
End: 2021-07-06
Payer: MEDICARE

## 2021-07-06 ENCOUNTER — LAB VISIT (OUTPATIENT)
Dept: LAB | Facility: HOSPITAL | Age: 71
End: 2021-07-06
Attending: INTERNAL MEDICINE
Payer: MEDICARE

## 2021-07-06 VITALS
WEIGHT: 158.38 LBS | SYSTOLIC BLOOD PRESSURE: 124 MMHG | DIASTOLIC BLOOD PRESSURE: 76 MMHG | BODY MASS INDEX: 24.81 KG/M2

## 2021-07-06 DIAGNOSIS — M05.79 RHEUMATOID ARTHRITIS INVOLVING MULTIPLE SITES WITH POSITIVE RHEUMATOID FACTOR: Primary | ICD-10-CM

## 2021-07-06 DIAGNOSIS — Z79.899 ENCOUNTER FOR LONG-TERM (CURRENT) DRUG USE: ICD-10-CM

## 2021-07-06 DIAGNOSIS — E83.52 HYPERCALCEMIA: ICD-10-CM

## 2021-07-06 DIAGNOSIS — M05.79 RHEUMATOID ARTHRITIS INVOLVING MULTIPLE SITES WITH POSITIVE RHEUMATOID FACTOR: ICD-10-CM

## 2021-07-06 LAB
ALBUMIN SERPL BCP-MCNC: 4 G/DL (ref 3.5–5.2)
ALP SERPL-CCNC: 42 U/L (ref 55–135)
ALT SERPL W/O P-5'-P-CCNC: 24 U/L (ref 10–44)
ANION GAP SERPL CALC-SCNC: 8 MMOL/L (ref 8–16)
AST SERPL-CCNC: 20 U/L (ref 10–40)
BASOPHILS # BLD AUTO: 0.02 K/UL (ref 0–0.2)
BASOPHILS NFR BLD: 0.4 % (ref 0–1.9)
BILIRUB SERPL-MCNC: 0.9 MG/DL (ref 0.1–1)
BUN SERPL-MCNC: 13 MG/DL (ref 8–23)
CA-I BLDV-SCNC: 1.27 MMOL/L (ref 1.06–1.42)
CALCIUM SERPL-MCNC: 9.4 MG/DL (ref 8.7–10.5)
CHLORIDE SERPL-SCNC: 102 MMOL/L (ref 95–110)
CO2 SERPL-SCNC: 29 MMOL/L (ref 23–29)
CREAT SERPL-MCNC: 0.9 MG/DL (ref 0.5–1.4)
CRP SERPL-MCNC: <0.02 MG/DL
DIFFERENTIAL METHOD: ABNORMAL
EOSINOPHIL # BLD AUTO: 0.1 K/UL (ref 0–0.5)
EOSINOPHIL NFR BLD: 1.5 % (ref 0–8)
ERYTHROCYTE [DISTWIDTH] IN BLOOD BY AUTOMATED COUNT: 13.2 % (ref 11.5–14.5)
EST. GFR  (AFRICAN AMERICAN): >60 ML/MIN/1.73 M^2
EST. GFR  (NON AFRICAN AMERICAN): >60 ML/MIN/1.73 M^2
GLUCOSE SERPL-MCNC: 131 MG/DL (ref 70–110)
HCT VFR BLD AUTO: 42.4 % (ref 40–54)
HGB BLD-MCNC: 13.9 G/DL (ref 14–18)
IMM GRANULOCYTES # BLD AUTO: 0.01 K/UL (ref 0–0.04)
IMM GRANULOCYTES NFR BLD AUTO: 0.2 % (ref 0–0.5)
LYMPHOCYTES # BLD AUTO: 1.8 K/UL (ref 1–4.8)
LYMPHOCYTES NFR BLD: 33.4 % (ref 18–48)
MCH RBC QN AUTO: 30 PG (ref 27–31)
MCHC RBC AUTO-ENTMCNC: 32.8 G/DL (ref 32–36)
MCV RBC AUTO: 91 FL (ref 82–98)
MONOCYTES # BLD AUTO: 0.5 K/UL (ref 0.3–1)
MONOCYTES NFR BLD: 9.9 % (ref 4–15)
NEUTROPHILS # BLD AUTO: 2.9 K/UL (ref 1.8–7.7)
NEUTROPHILS NFR BLD: 54.6 % (ref 38–73)
NRBC BLD-RTO: 0 /100 WBC
PLATELET # BLD AUTO: 253 K/UL (ref 150–450)
PMV BLD AUTO: 9.3 FL (ref 9.2–12.9)
POTASSIUM SERPL-SCNC: 4.4 MMOL/L (ref 3.5–5.1)
PROT SERPL-MCNC: 6.9 G/DL (ref 6–8.4)
PTH-INTACT SERPL-MCNC: 53.4 PG/ML (ref 9–77)
RBC # BLD AUTO: 4.64 M/UL (ref 4.6–6.2)
SODIUM SERPL-SCNC: 139 MMOL/L (ref 136–145)
WBC # BLD AUTO: 5.27 K/UL (ref 3.9–12.7)

## 2021-07-06 PROCEDURE — 1159F PR MEDICATION LIST DOCUMENTED IN MEDICAL RECORD: ICD-10-PCS | Mod: S$GLB,,, | Performed by: INTERNAL MEDICINE

## 2021-07-06 PROCEDURE — 36415 COLL VENOUS BLD VENIPUNCTURE: CPT | Performed by: INTERNAL MEDICINE

## 2021-07-06 PROCEDURE — 99214 OFFICE O/P EST MOD 30 MIN: CPT | Mod: S$GLB,,, | Performed by: INTERNAL MEDICINE

## 2021-07-06 PROCEDURE — 1159F MED LIST DOCD IN RCRD: CPT | Mod: S$GLB,,, | Performed by: INTERNAL MEDICINE

## 2021-07-06 PROCEDURE — 80053 COMPREHEN METABOLIC PANEL: CPT | Performed by: INTERNAL MEDICINE

## 2021-07-06 PROCEDURE — 82330 ASSAY OF CALCIUM: CPT | Performed by: INTERNAL MEDICINE

## 2021-07-06 PROCEDURE — 83970 ASSAY OF PARATHORMONE: CPT | Performed by: INTERNAL MEDICINE

## 2021-07-06 PROCEDURE — 86140 C-REACTIVE PROTEIN: CPT | Performed by: INTERNAL MEDICINE

## 2021-07-06 PROCEDURE — 85025 COMPLETE CBC W/AUTO DIFF WBC: CPT | Performed by: INTERNAL MEDICINE

## 2021-07-06 PROCEDURE — 99214 PR OFFICE/OUTPT VISIT, EST, LEVL IV, 30-39 MIN: ICD-10-PCS | Mod: S$GLB,,, | Performed by: INTERNAL MEDICINE

## 2021-07-26 ENCOUNTER — OFFICE VISIT (OUTPATIENT)
Dept: FAMILY MEDICINE | Facility: CLINIC | Age: 71
End: 2021-07-26
Payer: MEDICARE

## 2021-07-26 VITALS
DIASTOLIC BLOOD PRESSURE: 60 MMHG | SYSTOLIC BLOOD PRESSURE: 130 MMHG | HEIGHT: 67 IN | WEIGHT: 158 LBS | BODY MASS INDEX: 24.8 KG/M2 | HEART RATE: 80 BPM

## 2021-07-26 DIAGNOSIS — B35.4 TINEA CORPORIS: Primary | ICD-10-CM

## 2021-07-26 DIAGNOSIS — E11.9 TYPE 2 DIABETES MELLITUS WITHOUT COMPLICATION, WITHOUT LONG-TERM CURRENT USE OF INSULIN: ICD-10-CM

## 2021-07-26 PROCEDURE — 1159F MED LIST DOCD IN RCRD: CPT | Mod: S$GLB,,, | Performed by: PHYSICIAN ASSISTANT

## 2021-07-26 PROCEDURE — 1159F PR MEDICATION LIST DOCUMENTED IN MEDICAL RECORD: ICD-10-PCS | Mod: S$GLB,,, | Performed by: PHYSICIAN ASSISTANT

## 2021-07-26 PROCEDURE — 1160F RVW MEDS BY RX/DR IN RCRD: CPT | Mod: S$GLB,,, | Performed by: PHYSICIAN ASSISTANT

## 2021-07-26 PROCEDURE — 1101F PR PT FALLS ASSESS DOC 0-1 FALLS W/OUT INJ PAST YR: ICD-10-PCS | Mod: S$GLB,,, | Performed by: PHYSICIAN ASSISTANT

## 2021-07-26 PROCEDURE — 3288F PR FALLS RISK ASSESSMENT DOCUMENTED: ICD-10-PCS | Mod: S$GLB,,, | Performed by: PHYSICIAN ASSISTANT

## 2021-07-26 PROCEDURE — 1101F PT FALLS ASSESS-DOCD LE1/YR: CPT | Mod: S$GLB,,, | Performed by: PHYSICIAN ASSISTANT

## 2021-07-26 PROCEDURE — 1160F PR REVIEW ALL MEDS BY PRESCRIBER/CLIN PHARMACIST DOCUMENTED: ICD-10-PCS | Mod: S$GLB,,, | Performed by: PHYSICIAN ASSISTANT

## 2021-07-26 PROCEDURE — 3008F BODY MASS INDEX DOCD: CPT | Mod: S$GLB,,, | Performed by: PHYSICIAN ASSISTANT

## 2021-07-26 PROCEDURE — 3044F HG A1C LEVEL LT 7.0%: CPT | Mod: S$GLB,,, | Performed by: PHYSICIAN ASSISTANT

## 2021-07-26 PROCEDURE — 3044F PR MOST RECENT HEMOGLOBIN A1C LEVEL <7.0%: ICD-10-PCS | Mod: S$GLB,,, | Performed by: PHYSICIAN ASSISTANT

## 2021-07-26 PROCEDURE — 99214 OFFICE O/P EST MOD 30 MIN: CPT | Mod: S$GLB,,, | Performed by: PHYSICIAN ASSISTANT

## 2021-07-26 PROCEDURE — 3008F PR BODY MASS INDEX (BMI) DOCUMENTED: ICD-10-PCS | Mod: S$GLB,,, | Performed by: PHYSICIAN ASSISTANT

## 2021-07-26 PROCEDURE — 99214 PR OFFICE/OUTPT VISIT, EST, LEVL IV, 30-39 MIN: ICD-10-PCS | Mod: S$GLB,,, | Performed by: PHYSICIAN ASSISTANT

## 2021-07-26 PROCEDURE — 3288F FALL RISK ASSESSMENT DOCD: CPT | Mod: S$GLB,,, | Performed by: PHYSICIAN ASSISTANT

## 2021-07-26 RX ORDER — GLIPIZIDE AND METFORMIN HCL 5; 500 MG/1; MG/1
TABLET, FILM COATED ORAL
Qty: 270 TABLET | Refills: 1 | Status: SHIPPED | OUTPATIENT
Start: 2021-07-26 | End: 2022-01-27 | Stop reason: SDUPTHER

## 2021-07-26 RX ORDER — CLOTRIMAZOLE AND BETAMETHASONE DIPROPIONATE 10; .64 MG/G; MG/G
CREAM TOPICAL 2 TIMES DAILY
Qty: 45 G | Refills: 0 | Status: SHIPPED | OUTPATIENT
Start: 2021-07-26 | End: 2021-08-19 | Stop reason: SDUPTHER

## 2021-08-19 DIAGNOSIS — B35.4 TINEA CORPORIS: ICD-10-CM

## 2021-08-19 RX ORDER — CLOTRIMAZOLE AND BETAMETHASONE DIPROPIONATE 10; .64 MG/G; MG/G
CREAM TOPICAL 2 TIMES DAILY
Qty: 45 G | Refills: 0 | Status: SHIPPED | OUTPATIENT
Start: 2021-08-19 | End: 2021-09-14 | Stop reason: SDUPTHER

## 2021-09-14 DIAGNOSIS — B35.4 TINEA CORPORIS: ICD-10-CM

## 2021-09-14 RX ORDER — CLOTRIMAZOLE AND BETAMETHASONE DIPROPIONATE 10; .64 MG/G; MG/G
CREAM TOPICAL 2 TIMES DAILY
Qty: 45 G | Refills: 0 | Status: SHIPPED | OUTPATIENT
Start: 2021-09-14 | End: 2021-11-13

## 2021-09-16 ENCOUNTER — TELEPHONE (OUTPATIENT)
Dept: RHEUMATOLOGY | Facility: CLINIC | Age: 71
End: 2021-09-16

## 2021-09-16 DIAGNOSIS — M05.79 RHEUMATOID ARTHRITIS INVOLVING MULTIPLE SITES WITH POSITIVE RHEUMATOID FACTOR: Primary | ICD-10-CM

## 2021-09-16 DIAGNOSIS — Z79.899 ENCOUNTER FOR LONG-TERM (CURRENT) DRUG USE: ICD-10-CM

## 2021-10-06 ENCOUNTER — TELEPHONE (OUTPATIENT)
Dept: RHEUMATOLOGY | Facility: CLINIC | Age: 71
End: 2021-10-06

## 2021-10-07 ENCOUNTER — TELEPHONE (OUTPATIENT)
Dept: FAMILY MEDICINE | Facility: CLINIC | Age: 71
End: 2021-10-07

## 2021-10-11 LAB
ALBUMIN SERPL-MCNC: 4.3 G/DL (ref 3.6–5.1)
ALBUMIN/GLOB SERPL: 1.9 (CALC) (ref 1–2.5)
ALP SERPL-CCNC: 48 U/L (ref 35–144)
ALT SERPL-CCNC: 14 U/L (ref 9–46)
AST SERPL-CCNC: 14 U/L (ref 10–35)
BASOPHILS # BLD AUTO: 32 CELLS/UL (ref 0–200)
BASOPHILS NFR BLD AUTO: 0.5 %
BILIRUB SERPL-MCNC: 0.8 MG/DL (ref 0.2–1.2)
BUN SERPL-MCNC: 11 MG/DL (ref 7–25)
BUN/CREAT SERPL: ABNORMAL (CALC) (ref 6–22)
CALCIUM SERPL-MCNC: 9.9 MG/DL (ref 8.6–10.3)
CHLORIDE SERPL-SCNC: 103 MMOL/L (ref 98–110)
CO2 SERPL-SCNC: 29 MMOL/L (ref 20–32)
CREAT SERPL-MCNC: 0.9 MG/DL (ref 0.7–1.18)
CRP SERPL-MCNC: <0.2 MG/L
EOSINOPHIL # BLD AUTO: 70 CELLS/UL (ref 15–500)
EOSINOPHIL NFR BLD AUTO: 1.1 %
ERYTHROCYTE [DISTWIDTH] IN BLOOD BY AUTOMATED COUNT: 13.4 % (ref 11–15)
GLOBULIN SER CALC-MCNC: 2.3 G/DL (CALC) (ref 1.9–3.7)
GLUCOSE SERPL-MCNC: 184 MG/DL (ref 65–139)
HCT VFR BLD AUTO: 42.4 % (ref 38.5–50)
HGB BLD-MCNC: 13.8 G/DL (ref 13.2–17.1)
LYMPHOCYTES # BLD AUTO: 1414 CELLS/UL (ref 850–3900)
LYMPHOCYTES NFR BLD AUTO: 22.1 %
MCH RBC QN AUTO: 29.8 PG (ref 27–33)
MCHC RBC AUTO-ENTMCNC: 32.5 G/DL (ref 32–36)
MCV RBC AUTO: 91.6 FL (ref 80–100)
MONOCYTES # BLD AUTO: 480 CELLS/UL (ref 200–950)
MONOCYTES NFR BLD AUTO: 7.5 %
NEUTROPHILS # BLD AUTO: 4403 CELLS/UL (ref 1500–7800)
NEUTROPHILS NFR BLD AUTO: 68.8 %
PLATELET # BLD AUTO: 237 THOUSAND/UL (ref 140–400)
PMV BLD REES-ECKER: 10.4 FL (ref 7.5–12.5)
POTASSIUM SERPL-SCNC: 4.4 MMOL/L (ref 3.5–5.3)
PROT SERPL-MCNC: 6.6 G/DL (ref 6.1–8.1)
RBC # BLD AUTO: 4.63 MILLION/UL (ref 4.2–5.8)
SODIUM SERPL-SCNC: 140 MMOL/L (ref 135–146)
WBC # BLD AUTO: 6.4 THOUSAND/UL (ref 3.8–10.8)

## 2021-10-20 ENCOUNTER — TELEPHONE (OUTPATIENT)
Dept: RHEUMATOLOGY | Facility: CLINIC | Age: 71
End: 2021-10-20

## 2021-10-26 ENCOUNTER — TELEPHONE (OUTPATIENT)
Dept: FAMILY MEDICINE | Facility: CLINIC | Age: 71
End: 2021-10-26
Payer: MEDICARE

## 2021-11-02 ENCOUNTER — TELEPHONE (OUTPATIENT)
Dept: RHEUMATOLOGY | Facility: CLINIC | Age: 71
End: 2021-11-02
Payer: MEDICARE

## 2021-11-02 ENCOUNTER — TELEPHONE (OUTPATIENT)
Dept: FAMILY MEDICINE | Facility: CLINIC | Age: 71
End: 2021-11-02
Payer: MEDICARE

## 2021-11-02 ENCOUNTER — TELEPHONE (OUTPATIENT)
Dept: OPTOMETRY | Facility: CLINIC | Age: 71
End: 2021-11-02
Payer: MEDICARE

## 2021-11-02 DIAGNOSIS — E11.9 DIABETIC EYE EXAM: Primary | ICD-10-CM

## 2021-11-02 DIAGNOSIS — Z01.00 DIABETIC EYE EXAM: Primary | ICD-10-CM

## 2021-11-08 ENCOUNTER — OFFICE VISIT (OUTPATIENT)
Dept: RHEUMATOLOGY | Facility: CLINIC | Age: 71
End: 2021-11-08
Payer: MEDICARE

## 2021-11-08 VITALS
DIASTOLIC BLOOD PRESSURE: 87 MMHG | WEIGHT: 163.63 LBS | BODY MASS INDEX: 25.62 KG/M2 | SYSTOLIC BLOOD PRESSURE: 139 MMHG

## 2021-11-08 DIAGNOSIS — Z79.899 ENCOUNTER FOR LONG-TERM (CURRENT) DRUG USE: ICD-10-CM

## 2021-11-08 DIAGNOSIS — M05.79 RHEUMATOID ARTHRITIS INVOLVING MULTIPLE SITES WITH POSITIVE RHEUMATOID FACTOR: Primary | ICD-10-CM

## 2021-11-08 PROCEDURE — 1160F RVW MEDS BY RX/DR IN RCRD: CPT | Mod: S$GLB,,, | Performed by: INTERNAL MEDICINE

## 2021-11-08 PROCEDURE — 3075F SYST BP GE 130 - 139MM HG: CPT | Mod: S$GLB,,, | Performed by: INTERNAL MEDICINE

## 2021-11-08 PROCEDURE — 3066F PR DOCUMENTATION OF TREATMENT FOR NEPHROPATHY: ICD-10-PCS | Mod: S$GLB,,, | Performed by: INTERNAL MEDICINE

## 2021-11-08 PROCEDURE — 3061F NEG MICROALBUMINURIA REV: CPT | Mod: S$GLB,,, | Performed by: INTERNAL MEDICINE

## 2021-11-08 PROCEDURE — 1160F PR REVIEW ALL MEDS BY PRESCRIBER/CLIN PHARMACIST DOCUMENTED: ICD-10-PCS | Mod: S$GLB,,, | Performed by: INTERNAL MEDICINE

## 2021-11-08 PROCEDURE — 1101F PR PT FALLS ASSESS DOC 0-1 FALLS W/OUT INJ PAST YR: ICD-10-PCS | Mod: S$GLB,,, | Performed by: INTERNAL MEDICINE

## 2021-11-08 PROCEDURE — 3288F FALL RISK ASSESSMENT DOCD: CPT | Mod: S$GLB,,, | Performed by: INTERNAL MEDICINE

## 2021-11-08 PROCEDURE — 3061F PR NEG MICROALBUMINURIA RESULT DOCUMENTED/REVIEW: ICD-10-PCS | Mod: S$GLB,,, | Performed by: INTERNAL MEDICINE

## 2021-11-08 PROCEDURE — 3075F PR MOST RECENT SYSTOLIC BLOOD PRESS GE 130-139MM HG: ICD-10-PCS | Mod: S$GLB,,, | Performed by: INTERNAL MEDICINE

## 2021-11-08 PROCEDURE — 3044F HG A1C LEVEL LT 7.0%: CPT | Mod: S$GLB,,, | Performed by: INTERNAL MEDICINE

## 2021-11-08 PROCEDURE — 1101F PT FALLS ASSESS-DOCD LE1/YR: CPT | Mod: S$GLB,,, | Performed by: INTERNAL MEDICINE

## 2021-11-08 PROCEDURE — 3288F PR FALLS RISK ASSESSMENT DOCUMENTED: ICD-10-PCS | Mod: S$GLB,,, | Performed by: INTERNAL MEDICINE

## 2021-11-08 PROCEDURE — 3008F PR BODY MASS INDEX (BMI) DOCUMENTED: ICD-10-PCS | Mod: S$GLB,,, | Performed by: INTERNAL MEDICINE

## 2021-11-08 PROCEDURE — 3044F PR MOST RECENT HEMOGLOBIN A1C LEVEL <7.0%: ICD-10-PCS | Mod: S$GLB,,, | Performed by: INTERNAL MEDICINE

## 2021-11-08 PROCEDURE — 3008F BODY MASS INDEX DOCD: CPT | Mod: S$GLB,,, | Performed by: INTERNAL MEDICINE

## 2021-11-08 PROCEDURE — 1125F AMNT PAIN NOTED PAIN PRSNT: CPT | Mod: S$GLB,,, | Performed by: INTERNAL MEDICINE

## 2021-11-08 PROCEDURE — 99213 OFFICE O/P EST LOW 20 MIN: CPT | Mod: S$GLB,,, | Performed by: INTERNAL MEDICINE

## 2021-11-08 PROCEDURE — 1159F MED LIST DOCD IN RCRD: CPT | Mod: S$GLB,,, | Performed by: INTERNAL MEDICINE

## 2021-11-08 PROCEDURE — 3079F DIAST BP 80-89 MM HG: CPT | Mod: S$GLB,,, | Performed by: INTERNAL MEDICINE

## 2021-11-08 PROCEDURE — 3066F NEPHROPATHY DOC TX: CPT | Mod: S$GLB,,, | Performed by: INTERNAL MEDICINE

## 2021-11-08 PROCEDURE — 1159F PR MEDICATION LIST DOCUMENTED IN MEDICAL RECORD: ICD-10-PCS | Mod: S$GLB,,, | Performed by: INTERNAL MEDICINE

## 2021-11-08 PROCEDURE — 3079F PR MOST RECENT DIASTOLIC BLOOD PRESSURE 80-89 MM HG: ICD-10-PCS | Mod: S$GLB,,, | Performed by: INTERNAL MEDICINE

## 2021-11-08 PROCEDURE — 99213 PR OFFICE/OUTPT VISIT, EST, LEVL III, 20-29 MIN: ICD-10-PCS | Mod: S$GLB,,, | Performed by: INTERNAL MEDICINE

## 2021-11-08 PROCEDURE — 1125F PR PAIN SEVERITY QUANTIFIED, PAIN PRESENT: ICD-10-PCS | Mod: S$GLB,,, | Performed by: INTERNAL MEDICINE

## 2021-11-19 ENCOUNTER — OFFICE VISIT (OUTPATIENT)
Dept: OPTOMETRY | Facility: CLINIC | Age: 71
End: 2021-11-19
Payer: COMMERCIAL

## 2021-11-19 DIAGNOSIS — E11.9 DIABETES MELLITUS TYPE 2 WITHOUT RETINOPATHY: ICD-10-CM

## 2021-11-19 DIAGNOSIS — H50.011 ESOTROPIA, RIGHT EYE: ICD-10-CM

## 2021-11-19 DIAGNOSIS — Z79.899 HIGH RISK MEDICATION USE: ICD-10-CM

## 2021-11-19 DIAGNOSIS — H53.031 STRABISMIC AMBLYOPIA OF RIGHT EYE: ICD-10-CM

## 2021-11-19 DIAGNOSIS — E11.9 DIABETIC EYE EXAM: ICD-10-CM

## 2021-11-19 DIAGNOSIS — Z01.00 DIABETIC EYE EXAM: ICD-10-CM

## 2021-11-19 DIAGNOSIS — Z01.00 EXAMINATION OF EYES AND VISION: Primary | ICD-10-CM

## 2021-11-19 DIAGNOSIS — E11.36 DIABETIC CATARACT: ICD-10-CM

## 2021-11-19 DIAGNOSIS — M06.9 RHEUMATOID ARTHRITIS, INVOLVING UNSPECIFIED SITE, UNSPECIFIED WHETHER RHEUMATOID FACTOR PRESENT: ICD-10-CM

## 2021-11-19 DIAGNOSIS — H25.13 NUCLEAR SCLEROSIS, BILATERAL: ICD-10-CM

## 2021-11-19 DIAGNOSIS — H40.053 BILATERAL OCULAR HYPERTENSION: ICD-10-CM

## 2021-11-19 DIAGNOSIS — H52.7 REFRACTIVE ERROR: ICD-10-CM

## 2021-11-19 PROCEDURE — 92015 DETERMINE REFRACTIVE STATE: CPT | Mod: S$GLB,,, | Performed by: OPTOMETRIST

## 2021-11-19 PROCEDURE — 92004 COMPRE OPH EXAM NEW PT 1/>: CPT | Mod: S$GLB,,, | Performed by: OPTOMETRIST

## 2021-11-19 PROCEDURE — 99999 PR PBB SHADOW E&M-EST. PATIENT-LVL III: ICD-10-PCS | Mod: PBBFAC,,, | Performed by: OPTOMETRIST

## 2021-11-19 PROCEDURE — 92004 PR EYE EXAM, NEW PATIENT,COMPREHESV: ICD-10-PCS | Mod: S$GLB,,, | Performed by: OPTOMETRIST

## 2021-11-19 PROCEDURE — 99999 PR PBB SHADOW E&M-EST. PATIENT-LVL III: CPT | Mod: PBBFAC,,, | Performed by: OPTOMETRIST

## 2021-11-19 PROCEDURE — 92015 PR REFRACTION: ICD-10-PCS | Mod: S$GLB,,, | Performed by: OPTOMETRIST

## 2021-11-19 RX ORDER — LATANOPROST 50 UG/ML
1 SOLUTION/ DROPS OPHTHALMIC NIGHTLY
Qty: 7.5 ML | Refills: 3 | Status: SHIPPED | OUTPATIENT
Start: 2021-11-19 | End: 2022-07-21

## 2021-12-02 ENCOUNTER — TELEPHONE (OUTPATIENT)
Dept: FAMILY MEDICINE | Facility: CLINIC | Age: 71
End: 2021-12-02
Payer: MEDICARE

## 2021-12-02 DIAGNOSIS — Z79.899 ENCOUNTER FOR LONG-TERM (CURRENT) USE OF OTHER MEDICATIONS: Primary | ICD-10-CM

## 2021-12-09 LAB
BASOPHILS # BLD AUTO: 18 CELLS/UL (ref 0–200)
BASOPHILS NFR BLD AUTO: 0.3 %
CHOLEST SERPL-MCNC: 125 MG/DL
CHOLEST/HDLC SERPL: 2.3 (CALC)
EOSINOPHIL # BLD AUTO: 102 CELLS/UL (ref 15–500)
EOSINOPHIL NFR BLD AUTO: 1.7 %
ERYTHROCYTE [DISTWIDTH] IN BLOOD BY AUTOMATED COUNT: 13 % (ref 11–15)
HBA1C MFR BLD: 6.3 % OF TOTAL HGB
HCT VFR BLD AUTO: 41.9 % (ref 38.5–50)
HDLC SERPL-MCNC: 54 MG/DL
HGB BLD-MCNC: 13.8 G/DL (ref 13.2–17.1)
LDLC SERPL CALC-MCNC: 57 MG/DL (CALC)
LYMPHOCYTES # BLD AUTO: 1548 CELLS/UL (ref 850–3900)
LYMPHOCYTES NFR BLD AUTO: 25.8 %
MCH RBC QN AUTO: 29.1 PG (ref 27–33)
MCHC RBC AUTO-ENTMCNC: 32.9 G/DL (ref 32–36)
MCV RBC AUTO: 88.4 FL (ref 80–100)
MONOCYTES # BLD AUTO: 570 CELLS/UL (ref 200–950)
MONOCYTES NFR BLD AUTO: 9.5 %
NEUTROPHILS # BLD AUTO: 3762 CELLS/UL (ref 1500–7800)
NEUTROPHILS NFR BLD AUTO: 62.7 %
NONHDLC SERPL-MCNC: 71 MG/DL (CALC)
PLATELET # BLD AUTO: 248 THOUSAND/UL (ref 140–400)
PMV BLD REES-ECKER: 10 FL (ref 7.5–12.5)
RBC # BLD AUTO: 4.74 MILLION/UL (ref 4.2–5.8)
TRIGL SERPL-MCNC: 60 MG/DL
WBC # BLD AUTO: 6 THOUSAND/UL (ref 3.8–10.8)

## 2021-12-13 ENCOUNTER — TELEPHONE (OUTPATIENT)
Dept: FAMILY MEDICINE | Facility: CLINIC | Age: 71
End: 2021-12-13
Payer: MEDICARE

## 2021-12-20 ENCOUNTER — CLINICAL SUPPORT (OUTPATIENT)
Dept: OPHTHALMOLOGY | Facility: CLINIC | Age: 71
End: 2021-12-20
Payer: MEDICARE

## 2021-12-20 DIAGNOSIS — Z79.899 HIGH RISK MEDICATION USE: ICD-10-CM

## 2021-12-20 DIAGNOSIS — M06.9 RHEUMATOID ARTHRITIS, INVOLVING UNSPECIFIED SITE, UNSPECIFIED WHETHER RHEUMATOID FACTOR PRESENT: ICD-10-CM

## 2022-01-07 ENCOUNTER — CLINICAL SUPPORT (OUTPATIENT)
Dept: FAMILY MEDICINE | Facility: CLINIC | Age: 72
End: 2022-01-07
Payer: MEDICARE

## 2022-01-07 VITALS — TEMPERATURE: 98 F

## 2022-01-07 DIAGNOSIS — Z23 NEED FOR INFLUENZA VACCINATION: Primary | ICD-10-CM

## 2022-01-07 PROCEDURE — 90662 IIV NO PRSV INCREASED AG IM: CPT | Mod: S$GLB,,, | Performed by: FAMILY MEDICINE

## 2022-01-07 PROCEDURE — G0008 ADMIN INFLUENZA VIRUS VAC: HCPCS | Mod: S$GLB,,, | Performed by: FAMILY MEDICINE

## 2022-01-07 PROCEDURE — 90662 FLU VACCINE - QUADRIVALENT - HIGH DOSE (65+) PRESERVATIVE FREE IM: ICD-10-PCS | Mod: S$GLB,,, | Performed by: FAMILY MEDICINE

## 2022-01-07 PROCEDURE — G0008 FLU VACCINE - QUADRIVALENT - HIGH DOSE (65+) PRESERVATIVE FREE IM: ICD-10-PCS | Mod: S$GLB,,, | Performed by: FAMILY MEDICINE

## 2022-01-21 ENCOUNTER — CLINICAL SUPPORT (OUTPATIENT)
Dept: OPHTHALMOLOGY | Facility: CLINIC | Age: 72
End: 2022-01-21
Payer: MEDICARE

## 2022-01-21 ENCOUNTER — OFFICE VISIT (OUTPATIENT)
Dept: OPTOMETRY | Facility: CLINIC | Age: 72
End: 2022-01-21
Payer: MEDICARE

## 2022-01-21 DIAGNOSIS — H40.053 BILATERAL OCULAR HYPERTENSION: ICD-10-CM

## 2022-01-21 DIAGNOSIS — H40.1131 PRIMARY OPEN ANGLE GLAUCOMA (POAG) OF BOTH EYES, MILD STAGE: Primary | ICD-10-CM

## 2022-01-21 PROCEDURE — 1160F RVW MEDS BY RX/DR IN RCRD: CPT | Mod: CPTII,S$GLB,, | Performed by: OPTOMETRIST

## 2022-01-21 PROCEDURE — 99999 PR PBB SHADOW E&M-EST. PATIENT-LVL III: ICD-10-PCS | Mod: PBBFAC,,, | Performed by: OPTOMETRIST

## 2022-01-21 PROCEDURE — 1159F MED LIST DOCD IN RCRD: CPT | Mod: CPTII,S$GLB,, | Performed by: OPTOMETRIST

## 2022-01-21 PROCEDURE — 1126F PR PAIN SEVERITY QUANTIFIED, NO PAIN PRESENT: ICD-10-PCS | Mod: CPTII,S$GLB,, | Performed by: OPTOMETRIST

## 2022-01-21 PROCEDURE — 99213 PR OFFICE/OUTPT VISIT, EST, LEVL III, 20-29 MIN: ICD-10-PCS | Mod: S$GLB,,, | Performed by: OPTOMETRIST

## 2022-01-21 PROCEDURE — 1126F AMNT PAIN NOTED NONE PRSNT: CPT | Mod: CPTII,S$GLB,, | Performed by: OPTOMETRIST

## 2022-01-21 PROCEDURE — 1159F PR MEDICATION LIST DOCUMENTED IN MEDICAL RECORD: ICD-10-PCS | Mod: CPTII,S$GLB,, | Performed by: OPTOMETRIST

## 2022-01-21 PROCEDURE — 99213 OFFICE O/P EST LOW 20 MIN: CPT | Mod: S$GLB,,, | Performed by: OPTOMETRIST

## 2022-01-21 PROCEDURE — 1160F PR REVIEW ALL MEDS BY PRESCRIBER/CLIN PHARMACIST DOCUMENTED: ICD-10-PCS | Mod: CPTII,S$GLB,, | Performed by: OPTOMETRIST

## 2022-01-21 PROCEDURE — 99999 PR PBB SHADOW E&M-EST. PATIENT-LVL III: CPT | Mod: PBBFAC,,, | Performed by: OPTOMETRIST

## 2022-01-21 RX ORDER — TIMOLOL MALEATE 5 MG/ML
1 SOLUTION/ DROPS OPHTHALMIC DAILY
Qty: 10 ML | Refills: 1 | Status: SHIPPED | OUTPATIENT
Start: 2022-01-21 | End: 2022-04-13 | Stop reason: SDUPTHER

## 2022-01-21 NOTE — PROGRESS NOTES
HPI     70 y/o male presents for IOP Check/VF/OCT RNFL OU on Latanoprost x 2   months.   Patient notes no overall change in vision OU.  Using drops regularly. Ran   out yesterday evening.     Latanoprost qhs OU     Last edited by Freida Soto on 1/21/2022  9:49 AM. (History)            Assessment /Plan     For exam results, see Encounter Report.    Primary open angle glaucoma (POAG) of both eyes, mild stage  -     timolol maleate 0.5% (TIMOPTIC) 0.5 % Drop; Place 1 drop into the left eye once daily.  Dispense: 10 mL; Refill: 1      Reviewed F  OCT results with patient  Much improved IOP, target lower OS since poor vision OD from amblyopia  Continue latanoprost 0.005%: 1 gt qPM OU  Pt denies COPD/CHF/asthma - start timoptic 0.5%: 1 gt qAM OS only  Return in 1-2 months for iop check, sooner prn

## 2022-01-27 ENCOUNTER — OFFICE VISIT (OUTPATIENT)
Dept: FAMILY MEDICINE | Facility: CLINIC | Age: 72
End: 2022-01-27
Payer: MEDICARE

## 2022-01-27 VITALS
HEART RATE: 64 BPM | HEIGHT: 67 IN | SYSTOLIC BLOOD PRESSURE: 130 MMHG | DIASTOLIC BLOOD PRESSURE: 78 MMHG | BODY MASS INDEX: 26.21 KG/M2 | WEIGHT: 167 LBS

## 2022-01-27 DIAGNOSIS — E11.9 TYPE 2 DIABETES MELLITUS WITHOUT COMPLICATION, WITHOUT LONG-TERM CURRENT USE OF INSULIN: Primary | ICD-10-CM

## 2022-01-27 DIAGNOSIS — E78.5 HYPERLIPIDEMIA ASSOCIATED WITH TYPE 2 DIABETES MELLITUS: ICD-10-CM

## 2022-01-27 DIAGNOSIS — E11.69 HYPERLIPIDEMIA ASSOCIATED WITH TYPE 2 DIABETES MELLITUS: ICD-10-CM

## 2022-01-27 DIAGNOSIS — Z00.00 ROUTINE GENERAL MEDICAL EXAMINATION AT A HEALTH CARE FACILITY: ICD-10-CM

## 2022-01-27 PROCEDURE — 1159F PR MEDICATION LIST DOCUMENTED IN MEDICAL RECORD: ICD-10-PCS | Mod: S$GLB,,, | Performed by: PHYSICIAN ASSISTANT

## 2022-01-27 PROCEDURE — 99214 PR OFFICE/OUTPT VISIT, EST, LEVL IV, 30-39 MIN: ICD-10-PCS | Mod: S$GLB,,, | Performed by: PHYSICIAN ASSISTANT

## 2022-01-27 PROCEDURE — 3008F BODY MASS INDEX DOCD: CPT | Mod: S$GLB,,, | Performed by: PHYSICIAN ASSISTANT

## 2022-01-27 PROCEDURE — 1159F MED LIST DOCD IN RCRD: CPT | Mod: S$GLB,,, | Performed by: PHYSICIAN ASSISTANT

## 2022-01-27 PROCEDURE — 99214 OFFICE O/P EST MOD 30 MIN: CPT | Mod: S$GLB,,, | Performed by: PHYSICIAN ASSISTANT

## 2022-01-27 PROCEDURE — 3008F PR BODY MASS INDEX (BMI) DOCUMENTED: ICD-10-PCS | Mod: S$GLB,,, | Performed by: PHYSICIAN ASSISTANT

## 2022-01-27 PROCEDURE — 3072F LOW RISK FOR RETINOPATHY: CPT | Mod: S$GLB,,, | Performed by: PHYSICIAN ASSISTANT

## 2022-01-27 PROCEDURE — 3072F PR LOW RISK FOR RETINOPATHY: ICD-10-PCS | Mod: S$GLB,,, | Performed by: PHYSICIAN ASSISTANT

## 2022-01-27 RX ORDER — GLIPIZIDE AND METFORMIN HCL 5; 500 MG/1; MG/1
TABLET, FILM COATED ORAL
Qty: 270 TABLET | Refills: 1 | Status: SHIPPED | OUTPATIENT
Start: 2022-01-27 | End: 2022-07-25 | Stop reason: SDUPTHER

## 2022-01-27 RX ORDER — ATORVASTATIN CALCIUM 40 MG/1
40 TABLET, FILM COATED ORAL DAILY
Qty: 90 TABLET | Refills: 4 | Status: SHIPPED | OUTPATIENT
Start: 2022-01-27 | End: 2022-07-25 | Stop reason: SDUPTHER

## 2022-01-27 NOTE — PATIENT INSTRUCTIONS
"Patient Education       Diabetes and Diet   The Basics   Written by the doctors and editors at [facility]   Why is diet important in diabetes? -- Diet is important because it is part of diabetes treatment. Many people need to change what they eat and how much they eat to help treat their diabetes. It is important for people to treat their diabetes so that they:  · Keep their blood sugar at or near a normal level  · Prevent long-term problems, such as heart or kidney problems, that can happen in people with diabetes  Changing your diet can also help treat obesity, high blood pressure, and high cholesterol. These conditions can affect people with diabetes and can lead to future problems, such as heart attacks or strokes.  Who will work with me to change my diet? -- Your doctor or nurse will work with you to make a food plan to change your diet. They might also recommend that you work with a "dietitian." A dietitian is an expert on food and eating.  Do I need to eat at the same times every day? -- When and how often you should eat depends, in part, on the diabetes medicines you take. For example:  · People who take about the same amount of insulin at the same time each day (called a "fixed regimen") should eat meals at the same times. This is also true for people who take pills that increase insulin levels, such as sulfonylureas. Eating meals at the same time every day helps prevent low blood sugar.  · People who adjust the dose and timing of their insulin each day (called a "flexible regimen") do not always have to eat meals at the same time. That's because they can time their insulin dose for before they plan to eat, and also adjust the dose for how much they plan to eat.  · People who take medicines that don't usually cause low blood sugar, such as metformin, don't have to eat meals at the same time every day.  What do I need to think about when planning what to eat? -- Our bodies break down the food we eat into small " "pieces called carbohydrates, proteins, and fats.  When planning what to eat, people with diabetes need to think about:  · Carbohydrates (or "carbs") - Carbohydrates, which are sugars that our bodies use for energy, can raise a person's blood sugar level. Your doctor, nurse, or dietitian will tell you how many carbohydrates you should eat at each meal or snack. Foods that have carbohydrates include:  ? Bread, pasta, and rice  ? Vegetables and fruits  ? Dairy foods  ? Foods and drinks with added sugar  It is best to get your carbohydrates from fruits, vegetables, whole grains, and low-fat milk. It is best to avoid drinks with added sugar, like soda, juices, and sports drinks.   · Protein - Your doctor, nurse, or dietitian will tell you how much protein you should eat each day. It is best to eat lean meats, fish, eggs, beans, peas, soy products, nuts, and seeds.  · Fats - The type of fat you eat is more important than the amount of fat. "Saturated" and "trans" fats can increase your risk for heart problems, like a heart attack.  ? Foods that have saturated fats include meat, butter, cheese, and ice cream.  ? Foods that have trans fats include processed food with "partially hydrogenated oils" on the ingredient list. This may include fried foods, store bought cookies, muffins, pies, and cakes.  "Monounsaturated" and "polyunsaturated" fats are better for you. Foods with these types of fat include fish, avocado, olive oil, and nuts.  · Calories - People need to eat a certain amount of calories each day to keep their weight the same. People who are overweight and want to lose weight need to eat fewer calories each day.  · Fiber - Eating foods with a lot of fiber can help control a person's blood sugar level. Foods that have a lot of fiber include apples, green beans, peas, beans, lentils, nuts, oatmeal, and whole grains.  · Salt - People who have high blood pressure should not eat foods that contain a lot of salt (also " called sodium). People with high blood pressure should also eat healthy foods, such as fruits, vegetables, and low-fat dairy foods.  · Alcohol - Having more than 1 drink (for women) or 2 drinks (for men) a day can raise blood sugar levels. Also, drinks that have fruit juice or soda in them can raise blood sugar levels.  What can I do if I need to lose weight? -- If you need to lose weight, you can:  · Exercise - Try to get at least 30 minutes of physical activity a day, most days of the week. Even gentle exercise, like walking, is good for your health. Some people with diabetes need to change their medicine dose before they exercise. They might also need to check their blood sugar levels before and after exercising.  · Eat fewer calories - Your doctor, nurse, or dietitian can tell you how many calories you should eat each day in order to lose weight.  If you are worried about your weight, size, or shape, talk with your doctor, nurse, or dietitian. They can help you make changes to improve your health.  Can I eat the same foods as my family? -- Yes. You do not need to eat special foods if you have diabetes. You and your family can eat the same foods. Changing your diet is mostly about eating healthy foods and not eating too much.  What are the other parts of diabetes treatment? -- Besides changing your diet, the other parts of diabetes treatment are:  · Exercise  · Medicines  Some people with diabetes need to learn how to match their diet and exercise with their medicine dose. For example, people who use insulin might need to choose the dose of insulin they give themselves. To choose their dose, they need to think about:  · What they plan to eat at the next meal  · How much exercise they plan to do  · What their blood sugar level is  If the diet and exercise do not match the medicine dose, a person's blood sugar level can get too low or too high. Blood sugar levels that are too low or too high can cause  problems.  All topics are updated as new evidence becomes available and our peer review process is complete.  This topic retrieved from [employer] on: Sep 21, 2021.  Topic 85434 Version 7.0  Release: 29.4.2 - C29.263  © 2021 UpToDate, Inc. and/or its affiliates. All rights reserved.  Consumer Information Use and Disclaimer   This information is not specific medical advice and does not replace information you receive from your health care provider. This is only a brief summary of general information. It does NOT include all information about conditions, illnesses, injuries, tests, procedures, treatments, therapies, discharge instructions or life-style choices that may apply to you. You must talk with your health care provider for complete information about your health and treatment options. This information should not be used to decide whether or not to accept your health care provider's advice, instructions or recommendations. Only your health care provider has the knowledge and training to provide advice that is right for you. The use of this information is governed by the [employer] User License Agreement, available at https://[email or web address].Site Tour/en/solutions/[employer]/about/michelle.The use of [employer] content is governed by the [employer] Terms of Use. ©2021 UpToDate, Inc. All rights reserved.  Copyright   © 2021 UpToDate, Inc. and/or its affiliates. All rights reserved.

## 2022-01-27 NOTE — PROGRESS NOTES
SUBJECTIVE:    Patient ID: Kyrie Norton Jr. is a 71 y.o. male.    Chief Complaint: Diabetes (No bottles// refuses colonoscopy// no complaints-MJ)    Patient is a 71-year-old male presents today for regular 6 month follow-up. He has no acute complaints or concerns today. He follows with Dr. Barrett for rheumatoid arthritis. Continues with plaquenil and methotrexate and is stable. Also saw opthalmology recently, primary open angle glaucoma has improved. A1c last month was 6.3%, up from 5.7%. Monitors blood glucose at home. Discussed diet and exercise. Will complete cologuard this year. All other labs remain stable.         Telephone on 12/02/2021   Component Date Value Ref Range Status    WBC 12/08/2021 6.0  3.8 - 10.8 Thousand/uL Final    RBC 12/08/2021 4.74  4.20 - 5.80 Million/uL Final    Hemoglobin 12/08/2021 13.8  13.2 - 17.1 g/dL Final    Hematocrit 12/08/2021 41.9  38.5 - 50.0 % Final    MCV 12/08/2021 88.4  80.0 - 100.0 fL Final    MCH 12/08/2021 29.1  27.0 - 33.0 pg Final    MCHC 12/08/2021 32.9  32.0 - 36.0 g/dL Final    RDW 12/08/2021 13.0  11.0 - 15.0 % Final    Platelets 12/08/2021 248  140 - 400 Thousand/uL Final    MPV 12/08/2021 10.0  7.5 - 12.5 fL Final    Neutrophils, Abs 12/08/2021 3,762  1,500 - 7,800 cells/uL Final    Lymph # 12/08/2021 1,548  850 - 3,900 cells/uL Final    Mono # 12/08/2021 570  200 - 950 cells/uL Final    Eos # 12/08/2021 102  15 - 500 cells/uL Final    Baso # 12/08/2021 18  0 - 200 cells/uL Final    Neutrophils Relative 12/08/2021 62.7  % Final    Lymph % 12/08/2021 25.8  % Final    Mono % 12/08/2021 9.5  % Final    Eosinophil % 12/08/2021 1.7  % Final    Basophil % 12/08/2021 0.3  % Final    Hemoglobin A1C 12/08/2021 6.3* <5.7 % of total Hgb Final    Cholesterol 12/08/2021 125  <200 mg/dL Final    HDL 12/08/2021 54  > OR = 40 mg/dL Final    Triglycerides 12/08/2021 60  <150 mg/dL Final    LDL Cholesterol 12/08/2021 57  mg/dL (calc) Final     HDL/Cholesterol Ratio 12/08/2021 2.3  <5.0 (calc) Final    Non HDL Chol. (LDL+VLDL) 12/08/2021 71  <130 mg/dL (calc) Final   Orders Only on 10/08/2021   Component Date Value Ref Range Status    Glucose 10/08/2021 184* 65 - 139 mg/dL Final    BUN 10/08/2021 11  7 - 25 mg/dL Final    Creatinine 10/08/2021 0.90  0.70 - 1.18 mg/dL Final    eGFR if non  10/08/2021 86  > OR = 60 mL/min/1.73m2 Final    eGFR if African American 10/08/2021 100  > OR = 60 mL/min/1.73m2 Final    BUN/Creatinine Ratio 10/08/2021 NOT APPLICABLE  6 - 22 (calc) Final    Sodium 10/08/2021 140  135 - 146 mmol/L Final    Potassium 10/08/2021 4.4  3.5 - 5.3 mmol/L Final    Chloride 10/08/2021 103  98 - 110 mmol/L Final    CO2 10/08/2021 29  20 - 32 mmol/L Final    Calcium 10/08/2021 9.9  8.6 - 10.3 mg/dL Final    Total Protein 10/08/2021 6.6  6.1 - 8.1 g/dL Final    Albumin 10/08/2021 4.3  3.6 - 5.1 g/dL Final    Globulin, Total 10/08/2021 2.3  1.9 - 3.7 g/dL (calc) Final    Albumin/Globulin Ratio 10/08/2021 1.9  1.0 - 2.5 (calc) Final    Total Bilirubin 10/08/2021 0.8  0.2 - 1.2 mg/dL Final    Alkaline Phosphatase 10/08/2021 48  35 - 144 U/L Final    AST 10/08/2021 14  10 - 35 U/L Final    ALT 10/08/2021 14  9 - 46 U/L Final    WBC 10/08/2021 6.4  3.8 - 10.8 Thousand/uL Final    RBC 10/08/2021 4.63  4.20 - 5.80 Million/uL Final    Hemoglobin 10/08/2021 13.8  13.2 - 17.1 g/dL Final    Hematocrit 10/08/2021 42.4  38.5 - 50.0 % Final    MCV 10/08/2021 91.6  80.0 - 100.0 fL Final    MCH 10/08/2021 29.8  27.0 - 33.0 pg Final    MCHC 10/08/2021 32.5  32.0 - 36.0 g/dL Final    RDW 10/08/2021 13.4  11.0 - 15.0 % Final    Platelets 10/08/2021 237  140 - 400 Thousand/uL Final    MPV 10/08/2021 10.4  7.5 - 12.5 fL Final    Neutrophils, Abs 10/08/2021 4,403  1,500 - 7,800 cells/uL Final    Lymph # 10/08/2021 1,414  850 - 3,900 cells/uL Final    Mono # 10/08/2021 480  200 - 950 cells/uL Final    Eos # 10/08/2021 70   15 - 500 cells/uL Final    Baso # 10/08/2021 32  0 - 200 cells/uL Final    Neutrophils Relative 10/08/2021 68.8  % Final    Lymph % 10/08/2021 22.1  % Final    Mono % 10/08/2021 7.5  % Final    Eosinophil % 10/08/2021 1.1  % Final    Basophil % 10/08/2021 0.5  % Final    CRP 10/08/2021 <0.2  <8.0 mg/L Final       Past Medical History:   Diagnosis Date    Diabetes mellitus type II     Hyperlipidemia     Hypertension      History reviewed. No pertinent surgical history.  Family History   Problem Relation Age of Onset    Blindness Neg Hx     Glaucoma Neg Hx     Macular degeneration Neg Hx        Marital Status:   Alcohol History:  reports current alcohol use.  Tobacco History:  reports that he has never smoked. He has never used smokeless tobacco.  Drug History:  reports no history of drug use.    Review of patient's allergies indicates:   Allergen Reactions    Parafon forte Nausea Only       Current Outpatient Medications:     aspirin (ECOTRIN) 81 MG EC tablet, Take 81 mg by mouth once daily., Disp: , Rfl:     atorvastatin (LIPITOR) 40 MG tablet, Take 1 tablet (40 mg total) by mouth once daily., Disp: 90 tablet, Rfl: 4    blood sugar diagnostic (ACCU-CHEK CJ PLUS TEST STRP) Strp, 1 strip by Subdermal route Daily., Disp: 100 strip, Rfl: 4    coenzyme Q10 10 mg capsule, Take 10 mg by mouth once daily., Disp: , Rfl:     fish oil-omega-3 fatty acids 300-1,000 mg capsule, Take 2 g by mouth once daily., Disp: , Rfl:     folic acid (FOLVITE) 1 MG tablet, TAKE 1 TABLET BY MOUTH EVERY DAY, Disp: 90 tablet, Rfl: 3    glipizide-metformin (METAGLIP) 5-500 mg per tablet, 1 tablet in the am, 2 tablets in the pm., Disp: 270 tablet, Rfl: 1    hydrOXYchloroQUINE (PLAQUENIL) 200 mg tablet, TAKE 1 TABLET BY MOUTH EVERY 12 HOURS, Disp: 180 tablet, Rfl: 3    ibuprofen (ADVIL,MOTRIN) 800 MG tablet, Take 1 tablet (800 mg total) by mouth every 6 (six) hours as needed for Pain., Disp: 20 tablet, Rfl: 0     "lancets (ACCU-CHEK FASTCLIX) Misc, 1 Device by Misc.(Non-Drug; Combo Route) route Daily., Disp: 100 each, Rfl: 4    latanoprost 0.005 % ophthalmic solution, Place 1 drop into both eyes every evening., Disp: 7.5 mL, Rfl: 3    methotrexate 2.5 MG Tab, TAKE 5 TABLETS BY MOUTH EVERY 7 DAYS, Disp: 60 tablet, Rfl: 1    MILK THISTLE ORAL, Take 250 mg by mouth once daily. , Disp: , Rfl:     predniSONE (DELTASONE) 2.5 MG tablet, TAKE 1 TABLET BY MOUTH TWICE A DAY, Disp: 60 tablet, Rfl: 5    timolol maleate 0.5% (TIMOPTIC) 0.5 % Drop, Place 1 drop into the left eye once daily., Disp: 10 mL, Rfl: 1    Review of Systems   Constitutional: Negative for activity change, fatigue, fever and unexpected weight change.   HENT: Negative for congestion.    Respiratory: Negative for apnea, cough, chest tightness and shortness of breath.    Cardiovascular: Negative for chest pain and palpitations.   Gastrointestinal: Negative for abdominal distention and abdominal pain.   Genitourinary: Negative for difficulty urinating and dysuria.   Musculoskeletal: Negative for arthralgias and back pain.   Neurological: Negative for dizziness and weakness.          Objective:      Vitals:    01/27/22 0717   BP: 130/78   Pulse: 64   Weight: 75.8 kg (167 lb)   Height: 5' 7" (1.702 m)     Physical Exam  Constitutional:       General: He is not in acute distress.  HENT:      Head: Normocephalic and atraumatic.   Eyes:      Pupils: Pupils are equal, round, and reactive to light.   Cardiovascular:      Rate and Rhythm: Normal rate and regular rhythm.      Heart sounds: Normal heart sounds.   Pulmonary:      Effort: Pulmonary effort is normal.      Breath sounds: Normal breath sounds.   Abdominal:      General: Bowel sounds are normal. There is no distension.      Palpations: Abdomen is soft.      Tenderness: There is no abdominal tenderness.   Genitourinary:     Rectum: Guaiac result negative.   Musculoskeletal:         General: Normal range of motion. "      Cervical back: Normal range of motion and neck supple.   Skin:     General: Skin is warm and dry.      Findings: No erythema or rash.   Neurological:      Mental Status: He is alert and oriented to person, place, and time.      Cranial Nerves: No cranial nerve deficit.           Assessment:       1. Type 2 diabetes mellitus without complication, without long-term current use of insulin    2. Routine general medical examination at a health care facility    3. Hyperlipidemia associated with type 2 diabetes mellitus         Plan:       Type 2 diabetes mellitus without complication, without long-term current use of insulin  Comments:  A1c 6.3% and stable. Refills today. Continue as is.  Orders:  -     glipizide-metformin (METAGLIP) 5-500 mg per tablet; 1 tablet in the am, 2 tablets in the pm.  Dispense: 270 tablet; Refill: 1    Routine general medical examination at a health care facility  Comments:  Labs reviewed and stable. Cologuard and refills ordered. Continue as is.    Orders:  -     Cologuard Screening (Multitarget Stool DNA); Future; Expected date: 01/27/2022    Hyperlipidemia associated with type 2 diabetes mellitus  Comments:  Labs reviewed and remain stable, refills today.  Orders:  -     atorvastatin (LIPITOR) 40 MG tablet; Take 1 tablet (40 mg total) by mouth once daily.  Dispense: 90 tablet; Refill: 4      Follow up in about 6 months (around 7/27/2022) for Diabetic Check-Up.        1/27/2022 Marco Antonio Loyola PA-C

## 2022-02-01 ENCOUNTER — TELEPHONE (OUTPATIENT)
Dept: FAMILY MEDICINE | Facility: CLINIC | Age: 72
End: 2022-02-01
Payer: MEDICARE

## 2022-02-01 DIAGNOSIS — Z12.11 SCREENING FOR COLON CANCER: Primary | ICD-10-CM

## 2022-02-01 NOTE — TELEPHONE ENCOUNTER
----- Message from Sarah Bucio MA sent at 1/31/2022  3:29 PM CST -----    ----- Message -----  From: Gretchen Cao  Sent: 1/30/2022  12:36 PM CST  To: Domenico fallon

## 2022-02-08 LAB
ALBUMIN SERPL-MCNC: 4.4 G/DL (ref 3.6–5.1)
ALBUMIN/GLOB SERPL: 2.1 (CALC) (ref 1–2.5)
ALP SERPL-CCNC: 48 U/L (ref 35–144)
ALT SERPL-CCNC: 15 U/L (ref 9–46)
AST SERPL-CCNC: 14 U/L (ref 10–35)
BASOPHILS # BLD AUTO: 19 CELLS/UL (ref 0–200)
BASOPHILS NFR BLD AUTO: 0.3 %
BILIRUB SERPL-MCNC: 1 MG/DL (ref 0.2–1.2)
BUN SERPL-MCNC: 12 MG/DL (ref 7–25)
BUN/CREAT SERPL: ABNORMAL (CALC) (ref 6–22)
CALCIUM SERPL-MCNC: 9.6 MG/DL (ref 8.6–10.3)
CHLORIDE SERPL-SCNC: 103 MMOL/L (ref 98–110)
CO2 SERPL-SCNC: 32 MMOL/L (ref 20–32)
CREAT SERPL-MCNC: 0.94 MG/DL (ref 0.7–1.18)
CRP SERPL-MCNC: 0.4 MG/L
EOSINOPHIL # BLD AUTO: 107 CELLS/UL (ref 15–500)
EOSINOPHIL NFR BLD AUTO: 1.7 %
ERYTHROCYTE [DISTWIDTH] IN BLOOD BY AUTOMATED COUNT: 13.4 % (ref 11–15)
GLOBULIN SER CALC-MCNC: 2.1 G/DL (CALC) (ref 1.9–3.7)
GLUCOSE SERPL-MCNC: 117 MG/DL (ref 65–99)
HCT VFR BLD AUTO: 43.3 % (ref 38.5–50)
HGB BLD-MCNC: 14.1 G/DL (ref 13.2–17.1)
LYMPHOCYTES # BLD AUTO: 1840 CELLS/UL (ref 850–3900)
LYMPHOCYTES NFR BLD AUTO: 29.2 %
MCH RBC QN AUTO: 29.1 PG (ref 27–33)
MCHC RBC AUTO-ENTMCNC: 32.6 G/DL (ref 32–36)
MCV RBC AUTO: 89.3 FL (ref 80–100)
MONOCYTES # BLD AUTO: 599 CELLS/UL (ref 200–950)
MONOCYTES NFR BLD AUTO: 9.5 %
NEUTROPHILS # BLD AUTO: 3736 CELLS/UL (ref 1500–7800)
NEUTROPHILS NFR BLD AUTO: 59.3 %
PLATELET # BLD AUTO: 267 THOUSAND/UL (ref 140–400)
PMV BLD REES-ECKER: 10.1 FL (ref 7.5–12.5)
POTASSIUM SERPL-SCNC: 4.9 MMOL/L (ref 3.5–5.3)
PROT SERPL-MCNC: 6.5 G/DL (ref 6.1–8.1)
RBC # BLD AUTO: 4.85 MILLION/UL (ref 4.2–5.8)
SODIUM SERPL-SCNC: 141 MMOL/L (ref 135–146)
WBC # BLD AUTO: 6.3 THOUSAND/UL (ref 3.8–10.8)

## 2022-02-18 ENCOUNTER — TELEPHONE (OUTPATIENT)
Dept: FAMILY MEDICINE | Facility: CLINIC | Age: 72
End: 2022-02-18
Payer: MEDICARE

## 2022-02-18 LAB — NONINV COLON CA DNA+OCC BLD SCRN STL QL: NEGATIVE

## 2022-02-18 NOTE — TELEPHONE ENCOUNTER
----- Message from Marco Antonio Loyola PA-C sent at 2/18/2022  7:16 AM CST -----  Negative cologuard screen.  We will recheck again in 3 years

## 2022-02-21 ENCOUNTER — OFFICE VISIT (OUTPATIENT)
Dept: RHEUMATOLOGY | Facility: CLINIC | Age: 72
End: 2022-02-21
Payer: MEDICARE

## 2022-02-21 ENCOUNTER — TELEPHONE (OUTPATIENT)
Dept: RHEUMATOLOGY | Facility: CLINIC | Age: 72
End: 2022-02-21
Payer: MEDICARE

## 2022-02-21 VITALS — WEIGHT: 167 LBS | BODY MASS INDEX: 26.16 KG/M2 | DIASTOLIC BLOOD PRESSURE: 65 MMHG | SYSTOLIC BLOOD PRESSURE: 113 MMHG

## 2022-02-21 DIAGNOSIS — Z79.899 ENCOUNTER FOR LONG-TERM (CURRENT) DRUG USE: ICD-10-CM

## 2022-02-21 DIAGNOSIS — M05.79 RHEUMATOID ARTHRITIS INVOLVING MULTIPLE SITES WITH POSITIVE RHEUMATOID FACTOR: Primary | ICD-10-CM

## 2022-02-21 PROCEDURE — 3074F PR MOST RECENT SYSTOLIC BLOOD PRESSURE < 130 MM HG: ICD-10-PCS | Mod: S$GLB,,, | Performed by: INTERNAL MEDICINE

## 2022-02-21 PROCEDURE — 3288F FALL RISK ASSESSMENT DOCD: CPT | Mod: S$GLB,,, | Performed by: INTERNAL MEDICINE

## 2022-02-21 PROCEDURE — 1159F MED LIST DOCD IN RCRD: CPT | Mod: S$GLB,,, | Performed by: INTERNAL MEDICINE

## 2022-02-21 PROCEDURE — 3008F PR BODY MASS INDEX (BMI) DOCUMENTED: ICD-10-PCS | Mod: S$GLB,,, | Performed by: INTERNAL MEDICINE

## 2022-02-21 PROCEDURE — 3072F LOW RISK FOR RETINOPATHY: CPT | Mod: S$GLB,,, | Performed by: INTERNAL MEDICINE

## 2022-02-21 PROCEDURE — 3074F SYST BP LT 130 MM HG: CPT | Mod: S$GLB,,, | Performed by: INTERNAL MEDICINE

## 2022-02-21 PROCEDURE — 3288F PR FALLS RISK ASSESSMENT DOCUMENTED: ICD-10-PCS | Mod: S$GLB,,, | Performed by: INTERNAL MEDICINE

## 2022-02-21 PROCEDURE — 1125F PR PAIN SEVERITY QUANTIFIED, PAIN PRESENT: ICD-10-PCS | Mod: S$GLB,,, | Performed by: INTERNAL MEDICINE

## 2022-02-21 PROCEDURE — 99213 OFFICE O/P EST LOW 20 MIN: CPT | Mod: S$GLB,,, | Performed by: INTERNAL MEDICINE

## 2022-02-21 PROCEDURE — 1160F PR REVIEW ALL MEDS BY PRESCRIBER/CLIN PHARMACIST DOCUMENTED: ICD-10-PCS | Mod: S$GLB,,, | Performed by: INTERNAL MEDICINE

## 2022-02-21 PROCEDURE — 1160F RVW MEDS BY RX/DR IN RCRD: CPT | Mod: S$GLB,,, | Performed by: INTERNAL MEDICINE

## 2022-02-21 PROCEDURE — 3008F BODY MASS INDEX DOCD: CPT | Mod: S$GLB,,, | Performed by: INTERNAL MEDICINE

## 2022-02-21 PROCEDURE — 3078F DIAST BP <80 MM HG: CPT | Mod: S$GLB,,, | Performed by: INTERNAL MEDICINE

## 2022-02-21 PROCEDURE — 1125F AMNT PAIN NOTED PAIN PRSNT: CPT | Mod: S$GLB,,, | Performed by: INTERNAL MEDICINE

## 2022-02-21 PROCEDURE — 3078F PR MOST RECENT DIASTOLIC BLOOD PRESSURE < 80 MM HG: ICD-10-PCS | Mod: S$GLB,,, | Performed by: INTERNAL MEDICINE

## 2022-02-21 PROCEDURE — 3072F PR LOW RISK FOR RETINOPATHY: ICD-10-PCS | Mod: S$GLB,,, | Performed by: INTERNAL MEDICINE

## 2022-02-21 PROCEDURE — 1159F PR MEDICATION LIST DOCUMENTED IN MEDICAL RECORD: ICD-10-PCS | Mod: S$GLB,,, | Performed by: INTERNAL MEDICINE

## 2022-02-21 PROCEDURE — 1101F PT FALLS ASSESS-DOCD LE1/YR: CPT | Mod: S$GLB,,, | Performed by: INTERNAL MEDICINE

## 2022-02-21 PROCEDURE — 99213 PR OFFICE/OUTPT VISIT, EST, LEVL III, 20-29 MIN: ICD-10-PCS | Mod: S$GLB,,, | Performed by: INTERNAL MEDICINE

## 2022-02-21 PROCEDURE — 1101F PR PT FALLS ASSESS DOC 0-1 FALLS W/OUT INJ PAST YR: ICD-10-PCS | Mod: S$GLB,,, | Performed by: INTERNAL MEDICINE

## 2022-02-21 NOTE — PROGRESS NOTES
[facility] RHEUMATOLOGY           Follow-up visit    Notes dictated to M*Modal. Please forgive any unintended errors.  Subjective:       Patient ID:   NAME: Kyrie Norton Jr. : 1950     71 y.o. male    Referring Doc: No ref. provider found  Other Physicians:    Chief Complaint:  Rheumatoid Arthritis      HPI/Interval History:  The patient is doing very well.  He has no complaints of any red, hot, and/or swollen joints.    ROS:   GEN:    no fever, night sweats or weight loss  SKIN:   no rashes, bruising, or swelling, no Raynauds, no photosensitivity  HEENT: no changes in vision, no mouth ulcers, no sicca symptoms, no scalp tenderness, no jaw claudication.  CV:      no CP, PND, JUAN, orthopnea, no palpitations  PULM: no SOB, cough, hemoptysis, sputum or pleuritic pain  GI:        no dysphagia, no GERD, no hematemesis, no abdominal pain, nausea, vomiting, constipation, diarrhea, melanotic stools, BRBPR  :      no hematuria, dysuria  NEURO: no paresthesias, headaches, acute visual disturbances  MUSCULOSKELETAL:  As  PSYCH:   No increased insomnia, no increased anxiety, no increased depression    Past Medical/Surgical History:  Past Medical History:   Diagnosis Date    Diabetes mellitus type II     Hyperlipidemia     Hypertension      History reviewed. No pertinent surgical history.    Allergies:  Review of patient's allergies indicates:   Allergen Reactions    Parafon forte Nausea Only       Social/Family History:  Social History     Socioeconomic History    Marital status:    Tobacco Use    Smoking status: Never Smoker    Smokeless tobacco: Never Used   Substance and Sexual Activity    Alcohol use: Yes     Comment: occasional    Drug use: No     Family History   Problem Relation Age of Onset    Blindness Neg Hx     Glaucoma Neg Hx     Macular degeneration Neg Hx      FAMILY HISTORY: negative for Connective Tissue Disease      Medications:    Current Outpatient Medications:     aspirin  (ECOTRIN) 81 MG EC tablet, Take 81 mg by mouth once daily., Disp: , Rfl:     atorvastatin (LIPITOR) 40 MG tablet, Take 1 tablet (40 mg total) by mouth once daily., Disp: 90 tablet, Rfl: 4    blood sugar diagnostic (ACCU-CHEK CJ PLUS TEST STRP) Strp, 1 strip by Subdermal route Daily., Disp: 100 strip, Rfl: 4    coenzyme Q10 10 mg capsule, Take 10 mg by mouth once daily., Disp: , Rfl:     fish oil-omega-3 fatty acids 300-1,000 mg capsule, Take 2 g by mouth once daily., Disp: , Rfl:     folic acid (FOLVITE) 1 MG tablet, TAKE 1 TABLET BY MOUTH EVERY DAY, Disp: 90 tablet, Rfl: 3    glipizide-metformin (METAGLIP) 5-500 mg per tablet, 1 tablet in the am, 2 tablets in the pm., Disp: 270 tablet, Rfl: 1    hydrOXYchloroQUINE (PLAQUENIL) 200 mg tablet, TAKE 1 TABLET BY MOUTH EVERY 12 HOURS, Disp: 180 tablet, Rfl: 3    ibuprofen (ADVIL,MOTRIN) 800 MG tablet, Take 1 tablet (800 mg total) by mouth every 6 (six) hours as needed for Pain., Disp: 20 tablet, Rfl: 0    lancets (ACCU-CHEK FASTCLIX) Misc, 1 Device by Misc.(Non-Drug; Combo Route) route Daily., Disp: 100 each, Rfl: 4    latanoprost 0.005 % ophthalmic solution, Place 1 drop into both eyes every evening., Disp: 7.5 mL, Rfl: 3    methotrexate 2.5 MG Tab, TAKE 5 TABLETS BY MOUTH EVERY 7 DAYS, Disp: 60 tablet, Rfl: 1    MILK THISTLE ORAL, Take 250 mg by mouth once daily. , Disp: , Rfl:     predniSONE (DELTASONE) 2.5 MG tablet, TAKE 1 TABLET BY MOUTH TWICE A DAY, Disp: 60 tablet, Rfl: 5    timolol maleate 0.5% (TIMOPTIC) 0.5 % Drop, Place 1 drop into the left eye once daily., Disp: 10 mL, Rfl: 1      Objective:     Vitals:  Blood pressure 113/65, weight 75.8 kg (167 lb).    Physical Examination:   GEN: wn/wd male in no apparent distress  SKIN: no rashes, no sclerodactyly, no Raynaud's, no periungual erythema, no digital tip ulcerations, no nailbed pitting  HEAD: no alopecia, no scalp tenderness, no temporal artery tenderness or induration.  EYES: no pallor, no  icterus, PERRLA  ENT:  no thrush, no mucosal dryness or ulcerations, adequate oral hygiene & dentition.  NECK: supple x 6, no masses, no thyromegaly, no lymphadenopathy.  CV:   S1 and S2 regular, no murmurs, gallop or rubs  CHEST: Normal respiratory effort;  normal breath sounds/no adventitious sounds. No signs of consolidation.  ABD: non-tender and non-distended; soft; normal bowel sounds; no rebound/guarding or tenderness. No hepatosplenomegaly.  Musculoskeletal:  No evidence of active synovitis.  No obvious RA deformity  EXTREM: no clubbing, cyanosis or edema. normal pulses.  NEURO:  grossly intact; motor/sensory WNL; no tremors  PSYCH:  normal mood, affect and behavior    Labs:   Lab Results   Component Value Date    WBC 6.3 02/07/2022    HGB 14.1 02/07/2022    HCT 43.3 02/07/2022    MCV 89.3 02/07/2022     02/07/2022   CMP@  Sodium   Date Value Ref Range Status   02/07/2022 141 135 - 146 mmol/L Final   04/24/2019 139 134 - 144 mmol/L      Potassium   Date Value Ref Range Status   02/07/2022 4.9 3.5 - 5.3 mmol/L Final     Chloride   Date Value Ref Range Status   02/07/2022 103 98 - 110 mmol/L Final   04/24/2019 102 98 - 110 mmol/L      CO2   Date Value Ref Range Status   02/07/2022 32 20 - 32 mmol/L Final     Glucose   Date Value Ref Range Status   02/07/2022 117 (H) 65 - 99 mg/dL Final     Comment:                   Fasting reference interval     For someone without known diabetes, a glucose value  between 100 and 125 mg/dL is consistent with  prediabetes and should be confirmed with a  follow-up test.        04/24/2019 85 70 - 99 mg/dL      BUN   Date Value Ref Range Status   02/07/2022 12 7 - 25 mg/dL Final     Creatinine   Date Value Ref Range Status   02/07/2022 0.94 0.70 - 1.18 mg/dL Final     Comment:     For patients >49 years of age, the reference limit  for Creatinine is approximately 13% higher for people  identified as -American.        04/24/2019 0.82 0.60 - 1.40 mg/dL      Calcium    Date Value Ref Range Status   02/07/2022 9.6 8.6 - 10.3 mg/dL Final     Total Protein   Date Value Ref Range Status   02/07/2022 6.5 6.1 - 8.1 g/dL Final     Albumin   Date Value Ref Range Status   02/07/2022 4.4 3.6 - 5.1 g/dL Final   04/24/2019 4.1 3.1 - 4.7 g/dL      Total Bilirubin   Date Value Ref Range Status   02/07/2022 1.0 0.2 - 1.2 mg/dL Final     Alkaline Phosphatase   Date Value Ref Range Status   07/06/2021 42 (L) 55 - 135 U/L Final     AST   Date Value Ref Range Status   02/07/2022 14 10 - 35 U/L Final     ALT   Date Value Ref Range Status   02/07/2022 15 9 - 46 U/L Final     CRP   Date Value Ref Range Status   02/07/2022 0.4 <8.0 mg/L Final     Rheumatoid Factor   Date Value Ref Range Status   09/26/2017 <10.0 0.0 - 13.9 IU/mL      Comment:     Performed at: [facility] [address], [id number] Ragland MD, Phone:  [phone]       Radiology/Diagnostic Studies:    None    Assessment/Discussion/Plan:   71 y.o. male with seronegative rheumatoid arthritis-good control on Plaquenil 400 mg daily, methotrexate 12.5 mg weekly, and prednisone 2.5 mg twice daily    PLAN:  I will continue his medication without change.  Routine follow-up blood testing will be done in 3 months    RTC:  I will see him back in 4 months        Electronically signed by Calderon Barrett MD

## 2022-02-21 NOTE — TELEPHONE ENCOUNTER
----- Message from Charlene Fernández sent at 2/21/2022 10:16 AM CST -----  Regarding: reschedule appointment  Contact: patient  Type:  Sooner Apoointment Request    Caller is requesting a sooner appointment.  Caller declined first available appointment listed below.  Caller will not accept being placed on the waitlist and is requesting a message be sent to doctor.    Name of Caller:  patient  When is the first available appointment?  03/07/22  Symptoms:  RA  Best Call Back Number:  [phone] (home)   Additional Information:  Patient would like to been seen in [place]. He states he saw Dr Hunt today so he can take an appointment end of August if possible. Please call patient to advise.Thanks!

## 2022-03-10 ENCOUNTER — PATIENT MESSAGE (OUTPATIENT)
Dept: RHEUMATOLOGY | Facility: CLINIC | Age: 72
End: 2022-03-10
Payer: MEDICARE

## 2022-04-04 DIAGNOSIS — M05.79 RHEUMATOID ARTHRITIS INVOLVING MULTIPLE SITES WITH POSITIVE RHEUMATOID FACTOR: Primary | ICD-10-CM

## 2022-04-04 RX ORDER — METHOTREXATE 2.5 MG/1
12.5 TABLET ORAL
Qty: 60 TABLET | Refills: 1 | Status: SHIPPED | OUTPATIENT
Start: 2022-04-04 | End: 2022-07-25 | Stop reason: SDUPTHER

## 2022-04-13 ENCOUNTER — OFFICE VISIT (OUTPATIENT)
Dept: OPTOMETRY | Facility: CLINIC | Age: 72
End: 2022-04-13
Payer: MEDICARE

## 2022-04-13 DIAGNOSIS — H40.1131 PRIMARY OPEN ANGLE GLAUCOMA (POAG) OF BOTH EYES, MILD STAGE: Primary | ICD-10-CM

## 2022-04-13 DIAGNOSIS — H57.89 EYE IRRITATION: ICD-10-CM

## 2022-04-13 PROCEDURE — 99999 PR PBB SHADOW E&M-EST. PATIENT-LVL III: ICD-10-PCS | Mod: PBBFAC,,, | Performed by: OPTOMETRIST

## 2022-04-13 PROCEDURE — 1159F MED LIST DOCD IN RCRD: CPT | Mod: CPTII,S$GLB,, | Performed by: OPTOMETRIST

## 2022-04-13 PROCEDURE — 1101F PT FALLS ASSESS-DOCD LE1/YR: CPT | Mod: CPTII,S$GLB,, | Performed by: OPTOMETRIST

## 2022-04-13 PROCEDURE — 99213 PR OFFICE/OUTPT VISIT, EST, LEVL III, 20-29 MIN: ICD-10-PCS | Mod: S$GLB,,, | Performed by: OPTOMETRIST

## 2022-04-13 PROCEDURE — 3288F FALL RISK ASSESSMENT DOCD: CPT | Mod: CPTII,S$GLB,, | Performed by: OPTOMETRIST

## 2022-04-13 PROCEDURE — 1160F RVW MEDS BY RX/DR IN RCRD: CPT | Mod: CPTII,S$GLB,, | Performed by: OPTOMETRIST

## 2022-04-13 PROCEDURE — 1160F PR REVIEW ALL MEDS BY PRESCRIBER/CLIN PHARMACIST DOCUMENTED: ICD-10-PCS | Mod: CPTII,S$GLB,, | Performed by: OPTOMETRIST

## 2022-04-13 PROCEDURE — 1126F AMNT PAIN NOTED NONE PRSNT: CPT | Mod: CPTII,S$GLB,, | Performed by: OPTOMETRIST

## 2022-04-13 PROCEDURE — 1159F PR MEDICATION LIST DOCUMENTED IN MEDICAL RECORD: ICD-10-PCS | Mod: CPTII,S$GLB,, | Performed by: OPTOMETRIST

## 2022-04-13 PROCEDURE — 1126F PR PAIN SEVERITY QUANTIFIED, NO PAIN PRESENT: ICD-10-PCS | Mod: CPTII,S$GLB,, | Performed by: OPTOMETRIST

## 2022-04-13 PROCEDURE — 99999 PR PBB SHADOW E&M-EST. PATIENT-LVL III: CPT | Mod: PBBFAC,,, | Performed by: OPTOMETRIST

## 2022-04-13 PROCEDURE — 1101F PR PT FALLS ASSESS DOC 0-1 FALLS W/OUT INJ PAST YR: ICD-10-PCS | Mod: CPTII,S$GLB,, | Performed by: OPTOMETRIST

## 2022-04-13 PROCEDURE — 99213 OFFICE O/P EST LOW 20 MIN: CPT | Mod: S$GLB,,, | Performed by: OPTOMETRIST

## 2022-04-13 PROCEDURE — 3288F PR FALLS RISK ASSESSMENT DOCUMENTED: ICD-10-PCS | Mod: CPTII,S$GLB,, | Performed by: OPTOMETRIST

## 2022-04-13 RX ORDER — PREDNISOLONE ACETATE 10 MG/ML
1 SUSPENSION/ DROPS OPHTHALMIC 4 TIMES DAILY
Qty: 5 ML | Refills: 0 | Status: SHIPPED | OUTPATIENT
Start: 2022-04-13 | End: 2022-04-18

## 2022-04-13 RX ORDER — TIMOLOL MALEATE 5 MG/ML
1 SOLUTION/ DROPS OPHTHALMIC DAILY
Qty: 10 ML | Refills: 1 | Status: SHIPPED | OUTPATIENT
Start: 2022-04-13 | End: 2022-08-23

## 2022-04-13 NOTE — PROGRESS NOTES
HPI     Pt here today for IOP check.  States no problems since starting on   Timoptic.      Latanoprost OU qhs  Timoptic OS qam    Did have some irritation w/ dryness & fbs OS over the weekend.   Flushed   with ATS & is feeling better.    Last edited by Shila Arceo on 4/13/2022  7:05 AM. (History)            Assessment /Plan     For exam results, see Encounter Report.    Primary open angle glaucoma (POAG) of both eyes, mild stage  -     timolol maleate 0.5% (TIMOPTIC) 0.5 % Drop; Place 1 drop into the left eye once daily.  Dispense: 10 mL; Refill: 1    Eye irritation  -     prednisoLONE acetate (PRED FORTE) 1 % DrpS; Place 1 drop into the left eye 4 (four) times daily. for 5 days  Dispense: 5 mL; Refill: 0      1. Primary open angle glaucoma (POAG) of both eyes, mild stage  IOP improved with use of drop  Continue latanoprost qPM OU  timoptic qAM OS  Recheck iop  glc eval  dfe in 6 months    - timolol maleate 0.5% (TIMOPTIC) 0.5 % Drop; Place 1 drop into the left eye once daily.  Dispense: 10 mL; Refill: 1    2. Eye irritation  Neg fb on upper and lower lid eversion  Start PF 1%: 1 gt qid OS x 3-5 days, then d/c. Shake well before use  Start otc ATs 2-4 times daily OU     - prednisoLONE acetate (PRED FORTE) 1 % DrpS; Place 1 drop into the left eye 4 (four) times daily. for 5 days  Dispense: 5 mL; Refill: 0      RTC in 6 months for comprehensive eye exam  glc workup

## 2022-07-18 ENCOUNTER — TELEPHONE (OUTPATIENT)
Dept: FAMILY MEDICINE | Facility: CLINIC | Age: 72
End: 2022-07-18

## 2022-07-18 DIAGNOSIS — Z12.5 ENCOUNTER FOR SCREENING FOR MALIGNANT NEOPLASM OF PROSTATE: ICD-10-CM

## 2022-07-18 DIAGNOSIS — Z79.899 ENCOUNTER FOR LONG-TERM (CURRENT) USE OF OTHER MEDICATIONS: Primary | ICD-10-CM

## 2022-07-18 NOTE — TELEPHONE ENCOUNTER
Pt states he has had an ulcer in his moth for 2 weeks. States he has tried oragel, salt water gargles and other OTC remedies. Pt would like something sent to his pharmacy

## 2022-07-19 LAB
ALBUMIN SERPL-MCNC: 4.1 G/DL (ref 3.6–5.1)
ALBUMIN/GLOB SERPL: 2 (CALC) (ref 1–2.5)
ALP SERPL-CCNC: 48 U/L (ref 35–144)
ALT SERPL-CCNC: 19 U/L (ref 9–46)
AST SERPL-CCNC: 12 U/L (ref 10–35)
BASOPHILS # BLD AUTO: 22 CELLS/UL (ref 0–200)
BASOPHILS NFR BLD AUTO: 0.4 %
BILIRUB SERPL-MCNC: 0.9 MG/DL (ref 0.2–1.2)
BUN SERPL-MCNC: 14 MG/DL (ref 7–25)
BUN/CREAT SERPL: ABNORMAL (CALC) (ref 6–22)
CALCIUM SERPL-MCNC: 9.5 MG/DL (ref 8.6–10.3)
CHLORIDE SERPL-SCNC: 104 MMOL/L (ref 98–110)
CHOLEST SERPL-MCNC: 108 MG/DL
CHOLEST/HDLC SERPL: 3 (CALC)
CO2 SERPL-SCNC: 29 MMOL/L (ref 20–32)
CREAT SERPL-MCNC: 0.9 MG/DL (ref 0.7–1.28)
EGFR: 91 ML/MIN/1.73M2
EOSINOPHIL # BLD AUTO: 110 CELLS/UL (ref 15–500)
EOSINOPHIL NFR BLD AUTO: 2 %
ERYTHROCYTE [DISTWIDTH] IN BLOOD BY AUTOMATED COUNT: 14.4 % (ref 11–15)
GLOBULIN SER CALC-MCNC: 2.1 G/DL (CALC) (ref 1.9–3.7)
GLUCOSE SERPL-MCNC: 106 MG/DL (ref 65–99)
HBA1C MFR BLD: 6 % OF TOTAL HGB
HCT VFR BLD AUTO: 39 % (ref 38.5–50)
HDLC SERPL-MCNC: 36 MG/DL
HGB BLD-MCNC: 13.1 G/DL (ref 13.2–17.1)
LDLC SERPL CALC-MCNC: 54 MG/DL (CALC)
LYMPHOCYTES # BLD AUTO: 1474 CELLS/UL (ref 850–3900)
LYMPHOCYTES NFR BLD AUTO: 26.8 %
MCH RBC QN AUTO: 30 PG (ref 27–33)
MCHC RBC AUTO-ENTMCNC: 33.6 G/DL (ref 32–36)
MCV RBC AUTO: 89.2 FL (ref 80–100)
MONOCYTES # BLD AUTO: 484 CELLS/UL (ref 200–950)
MONOCYTES NFR BLD AUTO: 8.8 %
NEUTROPHILS # BLD AUTO: 3410 CELLS/UL (ref 1500–7800)
NEUTROPHILS NFR BLD AUTO: 62 %
NONHDLC SERPL-MCNC: 72 MG/DL (CALC)
PLATELET # BLD AUTO: 197 THOUSAND/UL (ref 140–400)
PMV BLD REES-ECKER: 9.4 FL (ref 7.5–12.5)
POTASSIUM SERPL-SCNC: 4.7 MMOL/L (ref 3.5–5.3)
PROT SERPL-MCNC: 6.2 G/DL (ref 6.1–8.1)
PSA SERPL-MCNC: 2.66 NG/ML
RBC # BLD AUTO: 4.37 MILLION/UL (ref 4.2–5.8)
SODIUM SERPL-SCNC: 140 MMOL/L (ref 135–146)
TRIGL SERPL-MCNC: 96 MG/DL
TSH SERPL-ACNC: 1.94 MIU/L (ref 0.4–4.5)
WBC # BLD AUTO: 5.5 THOUSAND/UL (ref 3.8–10.8)

## 2022-07-25 ENCOUNTER — OFFICE VISIT (OUTPATIENT)
Dept: FAMILY MEDICINE | Facility: CLINIC | Age: 72
End: 2022-07-25
Payer: MEDICARE

## 2022-07-25 VITALS
HEIGHT: 67 IN | OXYGEN SATURATION: 99 % | DIASTOLIC BLOOD PRESSURE: 60 MMHG | BODY MASS INDEX: 24.96 KG/M2 | SYSTOLIC BLOOD PRESSURE: 102 MMHG | WEIGHT: 159 LBS | HEART RATE: 68 BPM

## 2022-07-25 DIAGNOSIS — M19.90 CHRONIC INFLAMMATORY ARTHRITIS: ICD-10-CM

## 2022-07-25 DIAGNOSIS — M05.79 RHEUMATOID ARTHRITIS INVOLVING MULTIPLE SITES WITH POSITIVE RHEUMATOID FACTOR: ICD-10-CM

## 2022-07-25 DIAGNOSIS — E11.69 HYPERLIPIDEMIA ASSOCIATED WITH TYPE 2 DIABETES MELLITUS: ICD-10-CM

## 2022-07-25 DIAGNOSIS — E78.5 HYPERLIPIDEMIA ASSOCIATED WITH TYPE 2 DIABETES MELLITUS: ICD-10-CM

## 2022-07-25 DIAGNOSIS — E11.9 TYPE 2 DIABETES MELLITUS WITHOUT COMPLICATION, WITHOUT LONG-TERM CURRENT USE OF INSULIN: Primary | ICD-10-CM

## 2022-07-25 DIAGNOSIS — Z79.631 METHOTREXATE, LONG TERM, CURRENT USE: ICD-10-CM

## 2022-07-25 PROCEDURE — 3074F SYST BP LT 130 MM HG: CPT | Mod: CPTII,S$GLB,, | Performed by: PHYSICIAN ASSISTANT

## 2022-07-25 PROCEDURE — 1159F PR MEDICATION LIST DOCUMENTED IN MEDICAL RECORD: ICD-10-PCS | Mod: CPTII,S$GLB,, | Performed by: PHYSICIAN ASSISTANT

## 2022-07-25 PROCEDURE — 3288F FALL RISK ASSESSMENT DOCD: CPT | Mod: CPTII,S$GLB,, | Performed by: PHYSICIAN ASSISTANT

## 2022-07-25 PROCEDURE — 3074F PR MOST RECENT SYSTOLIC BLOOD PRESSURE < 130 MM HG: ICD-10-PCS | Mod: CPTII,S$GLB,, | Performed by: PHYSICIAN ASSISTANT

## 2022-07-25 PROCEDURE — 99214 PR OFFICE/OUTPT VISIT, EST, LEVL IV, 30-39 MIN: ICD-10-PCS | Mod: S$GLB,,, | Performed by: PHYSICIAN ASSISTANT

## 2022-07-25 PROCEDURE — 3072F PR LOW RISK FOR RETINOPATHY: ICD-10-PCS | Mod: CPTII,S$GLB,, | Performed by: PHYSICIAN ASSISTANT

## 2022-07-25 PROCEDURE — 3078F DIAST BP <80 MM HG: CPT | Mod: CPTII,S$GLB,, | Performed by: PHYSICIAN ASSISTANT

## 2022-07-25 PROCEDURE — 3078F PR MOST RECENT DIASTOLIC BLOOD PRESSURE < 80 MM HG: ICD-10-PCS | Mod: CPTII,S$GLB,, | Performed by: PHYSICIAN ASSISTANT

## 2022-07-25 PROCEDURE — 3072F LOW RISK FOR RETINOPATHY: CPT | Mod: CPTII,S$GLB,, | Performed by: PHYSICIAN ASSISTANT

## 2022-07-25 PROCEDURE — 3288F PR FALLS RISK ASSESSMENT DOCUMENTED: ICD-10-PCS | Mod: CPTII,S$GLB,, | Performed by: PHYSICIAN ASSISTANT

## 2022-07-25 PROCEDURE — 3008F PR BODY MASS INDEX (BMI) DOCUMENTED: ICD-10-PCS | Mod: CPTII,S$GLB,, | Performed by: PHYSICIAN ASSISTANT

## 2022-07-25 PROCEDURE — 1159F MED LIST DOCD IN RCRD: CPT | Mod: CPTII,S$GLB,, | Performed by: PHYSICIAN ASSISTANT

## 2022-07-25 PROCEDURE — 99214 OFFICE O/P EST MOD 30 MIN: CPT | Mod: S$GLB,,, | Performed by: PHYSICIAN ASSISTANT

## 2022-07-25 PROCEDURE — 3044F HG A1C LEVEL LT 7.0%: CPT | Mod: CPTII,S$GLB,, | Performed by: PHYSICIAN ASSISTANT

## 2022-07-25 PROCEDURE — 3044F PR MOST RECENT HEMOGLOBIN A1C LEVEL <7.0%: ICD-10-PCS | Mod: CPTII,S$GLB,, | Performed by: PHYSICIAN ASSISTANT

## 2022-07-25 PROCEDURE — 3008F BODY MASS INDEX DOCD: CPT | Mod: CPTII,S$GLB,, | Performed by: PHYSICIAN ASSISTANT

## 2022-07-25 PROCEDURE — 1101F PT FALLS ASSESS-DOCD LE1/YR: CPT | Mod: CPTII,S$GLB,, | Performed by: PHYSICIAN ASSISTANT

## 2022-07-25 PROCEDURE — 1101F PR PT FALLS ASSESS DOC 0-1 FALLS W/OUT INJ PAST YR: ICD-10-PCS | Mod: CPTII,S$GLB,, | Performed by: PHYSICIAN ASSISTANT

## 2022-07-25 RX ORDER — ATORVASTATIN CALCIUM 40 MG/1
40 TABLET, FILM COATED ORAL DAILY
Qty: 90 TABLET | Refills: 4 | Status: SHIPPED | OUTPATIENT
Start: 2022-07-25 | End: 2023-01-30 | Stop reason: SDUPTHER

## 2022-07-25 RX ORDER — METHOTREXATE 2.5 MG/1
12.5 TABLET ORAL
Qty: 60 TABLET | Refills: 1 | Status: SHIPPED | OUTPATIENT
Start: 2022-07-25 | End: 2023-03-10 | Stop reason: SDUPTHER

## 2022-07-25 RX ORDER — FOLIC ACID 1 MG/1
1000 TABLET ORAL DAILY
Qty: 90 TABLET | Refills: 3 | Status: SHIPPED | OUTPATIENT
Start: 2022-07-25 | End: 2023-03-15 | Stop reason: SDUPTHER

## 2022-07-25 RX ORDER — GLIPIZIDE AND METFORMIN HCL 5; 500 MG/1; MG/1
TABLET, FILM COATED ORAL
Qty: 270 TABLET | Refills: 1 | Status: SHIPPED | OUTPATIENT
Start: 2022-07-25 | End: 2023-01-30 | Stop reason: SDUPTHER

## 2022-07-25 RX ORDER — PREDNISONE 2.5 MG/1
2.5 TABLET ORAL 2 TIMES DAILY
Qty: 60 TABLET | Refills: 5 | Status: SHIPPED | OUTPATIENT
Start: 2022-07-25 | End: 2022-08-29

## 2022-07-25 NOTE — PROGRESS NOTES
SUBJECTIVE:    Patient ID: Kyrie Norton Jr. is a 72 y.o. male.    Chief Complaint: Follow-up (Follow up for diabetes//no med bottles//foot exam ordered//tc)    This is a 72-year-old male who presents today for 3 month diabetic follow-up.  A1c check last week at 6%.  Thyroid, prostate and cholesterol well controlled on recent blood work as well.  He does continue to see Dr. Barrett for his rheumatoid arthritis.  Maintains on Plaquenil, methotrexate and low-dose prednisone.  Reports no issues with this at the moment.  He did complete his Cologuard screen at the last visit.  Negative. NO new concerns. Recently completed eye exam. DFE today.      Orders Only on 07/18/2022   Component Date Value Ref Range Status    WBC 07/18/2022 5.5  3.8 - 10.8 Thousand/uL Final    RBC 07/18/2022 4.37  4.20 - 5.80 Million/uL Final    Hemoglobin 07/18/2022 13.1 (A) 13.2 - 17.1 g/dL Final    Hematocrit 07/18/2022 39.0  38.5 - 50.0 % Final    MCV 07/18/2022 89.2  80.0 - 100.0 fL Final    MCH 07/18/2022 30.0  27.0 - 33.0 pg Final    MCHC 07/18/2022 33.6  32.0 - 36.0 g/dL Final    RDW 07/18/2022 14.4  11.0 - 15.0 % Final    Platelets 07/18/2022 197  140 - 400 Thousand/uL Final    MPV 07/18/2022 9.4  7.5 - 12.5 fL Final    Neutrophils, Abs 07/18/2022 3,410  1,500 - 7,800 cells/uL Final    Lymph # 07/18/2022 1,474  850 - 3,900 cells/uL Final    Mono # 07/18/2022 484  200 - 950 cells/uL Final    Eos # 07/18/2022 110  15 - 500 cells/uL Final    Baso # 07/18/2022 22  0 - 200 cells/uL Final    Neutrophils Relative 07/18/2022 62  % Final    Lymph % 07/18/2022 26.8  % Final    Mono % 07/18/2022 8.8  % Final    Eosinophil % 07/18/2022 2.0  % Final    Basophil % 07/18/2022 0.4  % Final    Glucose 07/18/2022 106 (A) 65 - 99 mg/dL Final    BUN 07/18/2022 14  7 - 25 mg/dL Final    Creatinine 07/18/2022 0.90  0.70 - 1.28 mg/dL Final    EGFR 07/18/2022 91  > OR = 60 mL/min/1.73m2 Final    BUN/Creatinine Ratio 07/18/2022 NOT  APPLICABLE  6 - 22 (calc) Final    Sodium 07/18/2022 140  135 - 146 mmol/L Final    Potassium 07/18/2022 4.7  3.5 - 5.3 mmol/L Final    Chloride 07/18/2022 104  98 - 110 mmol/L Final    CO2 07/18/2022 29  20 - 32 mmol/L Final    Calcium 07/18/2022 9.5  8.6 - 10.3 mg/dL Final    Total Protein 07/18/2022 6.2  6.1 - 8.1 g/dL Final    Albumin 07/18/2022 4.1  3.6 - 5.1 g/dL Final    Globulin, Total 07/18/2022 2.1  1.9 - 3.7 g/dL (calc) Final    Albumin/Globulin Ratio 07/18/2022 2.0  1.0 - 2.5 (calc) Final    Total Bilirubin 07/18/2022 0.9  0.2 - 1.2 mg/dL Final    Alkaline Phosphatase 07/18/2022 48  35 - 144 U/L Final    AST 07/18/2022 12  10 - 35 U/L Final    ALT 07/18/2022 19  9 - 46 U/L Final    Cholesterol 07/18/2022 108  <200 mg/dL Final    HDL 07/18/2022 36 (A) > OR = 40 mg/dL Final    Triglycerides 07/18/2022 96  <150 mg/dL Final    LDL Cholesterol 07/18/2022 54  mg/dL (calc) Final    HDL/Cholesterol Ratio 07/18/2022 3.0  <5.0 (calc) Final    Non HDL Chol. (LDL+VLDL) 07/18/2022 72  <130 mg/dL (calc) Final    TSH w/reflex to FT4 07/18/2022 1.94  0.40 - 4.50 mIU/L Final    PROSTATE SPECIFIC ANTIGEN, SCR - Q* 07/18/2022 2.66  < OR = 4.00 ng/mL Final    Hemoglobin A1C 07/18/2022 6.0 (A) <5.7 % of total Hgb Final   Orders Only on 02/07/2022   Component Date Value Ref Range Status    Glucose 02/07/2022 117 (A) 65 - 99 mg/dL Final    BUN 02/07/2022 12  7 - 25 mg/dL Final    Creatinine 02/07/2022 0.94  0.70 - 1.18 mg/dL Final    eGFR if non African American 02/07/2022 82  > OR = 60 mL/min/1.73m2 Final    eGFR if  02/07/2022 95  > OR = 60 mL/min/1.73m2 Final    BUN/Creatinine Ratio 02/07/2022 NOT APPLICABLE  6 - 22 (calc) Final    Sodium 02/07/2022 141  135 - 146 mmol/L Final    Potassium 02/07/2022 4.9  3.5 - 5.3 mmol/L Final    Chloride 02/07/2022 103  98 - 110 mmol/L Final    CO2 02/07/2022 32  20 - 32 mmol/L Final    Calcium 02/07/2022 9.6  8.6 - 10.3 mg/dL Final     Total Protein 02/07/2022 6.5  6.1 - 8.1 g/dL Final    Albumin 02/07/2022 4.4  3.6 - 5.1 g/dL Final    Globulin, Total 02/07/2022 2.1  1.9 - 3.7 g/dL (calc) Final    Albumin/Globulin Ratio 02/07/2022 2.1  1.0 - 2.5 (calc) Final    Total Bilirubin 02/07/2022 1.0  0.2 - 1.2 mg/dL Final    Alkaline Phosphatase 02/07/2022 48  35 - 144 U/L Final    AST 02/07/2022 14  10 - 35 U/L Final    ALT 02/07/2022 15  9 - 46 U/L Final    WBC 02/07/2022 6.3  3.8 - 10.8 Thousand/uL Final    RBC 02/07/2022 4.85  4.20 - 5.80 Million/uL Final    Hemoglobin 02/07/2022 14.1  13.2 - 17.1 g/dL Final    Hematocrit 02/07/2022 43.3  38.5 - 50.0 % Final    MCV 02/07/2022 89.3  80.0 - 100.0 fL Final    MCH 02/07/2022 29.1  27.0 - 33.0 pg Final    MCHC 02/07/2022 32.6  32.0 - 36.0 g/dL Final    RDW 02/07/2022 13.4  11.0 - 15.0 % Final    Platelets 02/07/2022 267  140 - 400 Thousand/uL Final    MPV 02/07/2022 10.1  7.5 - 12.5 fL Final    Neutrophils, Abs 02/07/2022 3,736  1,500 - 7,800 cells/uL Final    Lymph # 02/07/2022 1,840  850 - 3,900 cells/uL Final    Mono # 02/07/2022 599  200 - 950 cells/uL Final    Eos # 02/07/2022 107  15 - 500 cells/uL Final    Baso # 02/07/2022 19  0 - 200 cells/uL Final    Neutrophils Relative 02/07/2022 59.3  % Final    Lymph % 02/07/2022 29.2  % Final    Mono % 02/07/2022 9.5  % Final    Eosinophil % 02/07/2022 1.7  % Final    Basophil % 02/07/2022 0.3  % Final    CRP 02/07/2022 0.4  <8.0 mg/L Final   Telephone on 02/01/2022   Component Date Value Ref Range Status    Cologuard Result 02/14/2022 Negative  Negative Final       Past Medical History:   Diagnosis Date    Diabetes mellitus type II     Hyperlipidemia     Hypertension      No past surgical history on file.  Family History   Problem Relation Age of Onset    Blindness Neg Hx     Glaucoma Neg Hx     Macular degeneration Neg Hx        Marital Status:   Alcohol History:  reports current alcohol use.  Tobacco History:   reports that he has never smoked. He has never used smokeless tobacco.  Drug History:  reports no history of drug use.    Review of patient's allergies indicates:   Allergen Reactions    Parafon forte Nausea Only       Current Outpatient Medications:     aspirin (ECOTRIN) 81 MG EC tablet, Take 81 mg by mouth once daily., Disp: , Rfl:     atorvastatin (LIPITOR) 40 MG tablet, Take 1 tablet (40 mg total) by mouth once daily., Disp: 90 tablet, Rfl: 4    blood sugar diagnostic (ACCU-CHEK CJ PLUS TEST STRP) Strp, 1 strip by Subdermal route Daily., Disp: 100 strip, Rfl: 4    coenzyme Q10 10 mg capsule, Take 10 mg by mouth once daily., Disp: , Rfl:     fish oil-omega-3 fatty acids 300-1,000 mg capsule, Take 2 g by mouth once daily., Disp: , Rfl:     folic acid (FOLVITE) 1 MG tablet, TAKE 1 TABLET BY MOUTH EVERY DAY, Disp: 90 tablet, Rfl: 3    glipizide-metformin (METAGLIP) 5-500 mg per tablet, 1 tablet in the am, 2 tablets in the pm., Disp: 270 tablet, Rfl: 1    hydrOXYchloroQUINE (PLAQUENIL) 200 mg tablet, TAKE 1 TABLET BY MOUTH EVERY 12 HOURS, Disp: 180 tablet, Rfl: 0    ibuprofen (ADVIL,MOTRIN) 800 MG tablet, Take 1 tablet (800 mg total) by mouth every 6 (six) hours as needed for Pain., Disp: 20 tablet, Rfl: 0    lancets (ACCU-CHEK FASTCLIX) Misc, 1 Device by Misc.(Non-Drug; Combo Route) route Daily., Disp: 100 each, Rfl: 4    latanoprost 0.005 % ophthalmic solution, INSTILL 1 DROP INTO BOTH EYES EVERY EVENING, Disp: 7.5 mL, Rfl: 3    methotrexate 2.5 MG Tab, Take 5 tablets (12.5 mg total) by mouth every 7 days., Disp: 60 tablet, Rfl: 1    MILK THISTLE ORAL, Take 250 mg by mouth once daily. , Disp: , Rfl:     predniSONE (DELTASONE) 2.5 MG tablet, TAKE 1 TABLET BY MOUTH TWICE A DAY, Disp: 60 tablet, Rfl: 5    timolol maleate 0.5% (TIMOPTIC) 0.5 % Drop, Place 1 drop into the left eye once daily., Disp: 10 mL, Rfl: 1    Review of Systems   Constitutional: Negative for activity change, fatigue, fever and  "unexpected weight change.   HENT: Negative for congestion.    Respiratory: Negative for apnea, cough, chest tightness and shortness of breath.    Cardiovascular: Negative for chest pain and palpitations.   Gastrointestinal: Negative for abdominal distention and abdominal pain.   Genitourinary: Negative for difficulty urinating and dysuria.   Musculoskeletal: Negative for arthralgias and back pain.   Neurological: Negative for dizziness and weakness.          Objective:      Vitals:    07/25/22 0703   BP: 102/60   Pulse: 68   SpO2: 99%   Weight: 72.1 kg (159 lb)   Height: 5' 7" (1.702 m)     Physical Exam  Constitutional:       General: He is not in acute distress.     Appearance: He is well-developed.   HENT:      Head: Normocephalic and atraumatic.   Eyes:      Pupils: Pupils are equal, round, and reactive to light.   Neck:      Thyroid: No thyromegaly.   Cardiovascular:      Rate and Rhythm: Normal rate and regular rhythm.      Pulses:           Dorsalis pedis pulses are 2+ on the right side and 2+ on the left side.        Posterior tibial pulses are 2+ on the right side and 2+ on the left side.      Heart sounds: Normal heart sounds.   Pulmonary:      Effort: Pulmonary effort is normal.      Breath sounds: Normal breath sounds.   Abdominal:      General: Bowel sounds are normal. There is no distension.      Palpations: Abdomen is soft.      Tenderness: There is no abdominal tenderness.   Musculoskeletal:         General: Normal range of motion.      Cervical back: Normal range of motion and neck supple.      Right foot: Normal range of motion.      Left foot: Normal range of motion.   Feet:      Right foot:      Protective Sensation: 2 sites tested. 2 sites sensed.      Skin integrity: Callus present. No ulcer or blister.      Left foot:      Protective Sensation: 2 sites tested. 2 sites sensed.      Skin integrity: Callus present. No ulcer or blister.   Skin:     General: Skin is warm and dry.      Findings: No " erythema or rash.   Neurological:      Mental Status: He is alert and oriented to person, place, and time.      Cranial Nerves: No cranial nerve deficit.           Assessment:       1. Type 2 diabetes mellitus without complication, without long-term current use of insulin    2. Hyperlipidemia associated with type 2 diabetes mellitus         Plan:       Type 2 diabetes mellitus without complication, without long-term current use of insulin  Comments:  A1c is VERY well controlled at 6%. continue as is without change now  Orders:  -     Foot Exam Performed  -     glipizide-metformin (METAGLIP) 5-500 mg per tablet; 1 tablet in the am, 2 tablets in the pm.  Dispense: 270 tablet; Refill: 1    Hyperlipidemia associated with type 2 diabetes mellitus  Comments:  Labs reviewed and remain stable, refills today.  Orders:  -     atorvastatin (LIPITOR) 40 MG tablet; Take 1 tablet (40 mg total) by mouth once daily.  Dispense: 90 tablet; Refill: 4      Follow up in about 6 months (around 1/25/2023) for Diabetic Check-Up.        7/25/2022 Marco Antonio Loyola PA-C

## 2022-07-25 NOTE — TELEPHONE ENCOUNTER
----- Message from Jam Gunn sent at 7/25/2022  7:37 AM CDT -----  Patient is requesting folic acid, methotrexate and prednisone to to be sent to [facility].

## 2022-08-22 ENCOUNTER — TELEPHONE (OUTPATIENT)
Dept: RHEUMATOLOGY | Facility: CLINIC | Age: 72
End: 2022-08-22
Payer: MEDICARE

## 2022-08-22 ENCOUNTER — OFFICE VISIT (OUTPATIENT)
Dept: RHEUMATOLOGY | Facility: CLINIC | Age: 72
End: 2022-08-22
Payer: MEDICARE

## 2022-08-22 VITALS
WEIGHT: 163.19 LBS | BODY MASS INDEX: 25.56 KG/M2 | DIASTOLIC BLOOD PRESSURE: 80 MMHG | SYSTOLIC BLOOD PRESSURE: 143 MMHG

## 2022-08-22 DIAGNOSIS — Z79.899 ENCOUNTER FOR LONG-TERM (CURRENT) DRUG USE: ICD-10-CM

## 2022-08-22 DIAGNOSIS — M05.79 RHEUMATOID ARTHRITIS INVOLVING MULTIPLE SITES WITH POSITIVE RHEUMATOID FACTOR: Primary | ICD-10-CM

## 2022-08-22 PROCEDURE — 3079F DIAST BP 80-89 MM HG: CPT | Mod: CPTII,S$GLB,, | Performed by: INTERNAL MEDICINE

## 2022-08-22 PROCEDURE — 3079F PR MOST RECENT DIASTOLIC BLOOD PRESSURE 80-89 MM HG: ICD-10-PCS | Mod: CPTII,S$GLB,, | Performed by: INTERNAL MEDICINE

## 2022-08-22 PROCEDURE — 3072F PR LOW RISK FOR RETINOPATHY: ICD-10-PCS | Mod: CPTII,S$GLB,, | Performed by: INTERNAL MEDICINE

## 2022-08-22 PROCEDURE — 99213 OFFICE O/P EST LOW 20 MIN: CPT | Mod: S$GLB,,, | Performed by: INTERNAL MEDICINE

## 2022-08-22 PROCEDURE — 3072F LOW RISK FOR RETINOPATHY: CPT | Mod: CPTII,S$GLB,, | Performed by: INTERNAL MEDICINE

## 2022-08-22 PROCEDURE — 3077F SYST BP >= 140 MM HG: CPT | Mod: CPTII,S$GLB,, | Performed by: INTERNAL MEDICINE

## 2022-08-22 PROCEDURE — 99213 PR OFFICE/OUTPT VISIT, EST, LEVL III, 20-29 MIN: ICD-10-PCS | Mod: S$GLB,,, | Performed by: INTERNAL MEDICINE

## 2022-08-22 PROCEDURE — 1159F PR MEDICATION LIST DOCUMENTED IN MEDICAL RECORD: ICD-10-PCS | Mod: CPTII,S$GLB,, | Performed by: INTERNAL MEDICINE

## 2022-08-22 PROCEDURE — 3044F PR MOST RECENT HEMOGLOBIN A1C LEVEL <7.0%: ICD-10-PCS | Mod: CPTII,S$GLB,, | Performed by: INTERNAL MEDICINE

## 2022-08-22 PROCEDURE — 3008F PR BODY MASS INDEX (BMI) DOCUMENTED: ICD-10-PCS | Mod: CPTII,S$GLB,, | Performed by: INTERNAL MEDICINE

## 2022-08-22 PROCEDURE — 1159F MED LIST DOCD IN RCRD: CPT | Mod: CPTII,S$GLB,, | Performed by: INTERNAL MEDICINE

## 2022-08-22 PROCEDURE — 3077F PR MOST RECENT SYSTOLIC BLOOD PRESSURE >= 140 MM HG: ICD-10-PCS | Mod: CPTII,S$GLB,, | Performed by: INTERNAL MEDICINE

## 2022-08-22 PROCEDURE — 3044F HG A1C LEVEL LT 7.0%: CPT | Mod: CPTII,S$GLB,, | Performed by: INTERNAL MEDICINE

## 2022-08-22 PROCEDURE — 1160F RVW MEDS BY RX/DR IN RCRD: CPT | Mod: CPTII,S$GLB,, | Performed by: INTERNAL MEDICINE

## 2022-08-22 PROCEDURE — 3008F BODY MASS INDEX DOCD: CPT | Mod: CPTII,S$GLB,, | Performed by: INTERNAL MEDICINE

## 2022-08-22 PROCEDURE — 1160F PR REVIEW ALL MEDS BY PRESCRIBER/CLIN PHARMACIST DOCUMENTED: ICD-10-PCS | Mod: CPTII,S$GLB,, | Performed by: INTERNAL MEDICINE

## 2022-08-22 RX ORDER — TRIAMCINOLONE ACETONIDE 1 MG/G
PASTE DENTAL
COMMUNITY
Start: 2022-07-28

## 2022-08-22 NOTE — TELEPHONE ENCOUNTER
Patient called requesting labs to be ordered before New Patient appt, informed patiet las won't e ordereduntil patint becomes established with Dr. Aguilera. Patient stated understanding.

## 2022-08-22 NOTE — PROGRESS NOTES
[facility] RHEUMATOLOGY           Follow-up visit    Notes dictated to M*Modal. Please forgive any unintended errors.  Subjective:       Patient ID:   NAME: Kyrie Norton Jr. : 1950     72 y.o. male    Referring Doc: No ref. provider found  Other Physicians:    Chief Complaint:  Rheumatoid Arthritis      HPI/Interval History:  The patient is doing great.  He has no complaints of any red, hot, and/or swollen joints.  He has no morning stiffness.    ROS:   GEN:    no fever, night sweats or weight loss  SKIN:   no rashes, bruising, or swelling, no Raynauds, no photosensitivity  HEENT: no changes in vision, no mouth ulcers, no sicca symptoms, no scalp tenderness, no jaw claudication.  CV:      no CP, PND, JUAN, orthopnea, no palpitations  PULM: no SOB, cough, hemoptysis, sputum or pleuritic pain  GI:        no dysphagia, no GERD, no hematemesis, no abdominal pain, nausea, vomiting, constipation, diarrhea, melanotic stools, hematochezia  :      no hematuria, dysuria  NEURO: no paresthesias, headaches, seizures  MUSCULOSKELETAL:  As above  PSYCH:   No increased insomnia, no increased anxiety, no increased depression    Past Medical/Surgical History:  Past Medical History:   Diagnosis Date    Diabetes mellitus type II     Hyperlipidemia     Hypertension      History reviewed. No pertinent surgical history.    Allergies:  Review of patient's allergies indicates:   Allergen Reactions    Parafon forte Nausea Only       Social/Family History:  Social History     Socioeconomic History    Marital status:    Tobacco Use    Smoking status: Never Smoker    Smokeless tobacco: Never Used   Substance and Sexual Activity    Alcohol use: Yes     Comment: occasional    Drug use: No     Family History   Problem Relation Age of Onset    Blindness Neg Hx     Glaucoma Neg Hx     Macular degeneration Neg Hx      FAMILY HISTORY: negative for Connective Tissue Disease      Medications:    Current Outpatient  Medications:     aspirin (ECOTRIN) 81 MG EC tablet, Take 81 mg by mouth once daily., Disp: , Rfl:     atorvastatin (LIPITOR) 40 MG tablet, Take 1 tablet (40 mg total) by mouth once daily., Disp: 90 tablet, Rfl: 4    blood sugar diagnostic (ACCU-CHEK CJ PLUS TEST STRP) Strp, 1 strip by Subdermal route Daily., Disp: 100 strip, Rfl: 4    coenzyme Q10 10 mg capsule, Take 10 mg by mouth once daily., Disp: , Rfl:     fish oil-omega-3 fatty acids 300-1,000 mg capsule, Take 2 g by mouth once daily., Disp: , Rfl:     folic acid (FOLVITE) 1 MG tablet, Take 1 tablet (1,000 mcg total) by mouth once daily., Disp: 90 tablet, Rfl: 3    glipizide-metformin (METAGLIP) 5-500 mg per tablet, 1 tablet in the am, 2 tablets in the pm., Disp: 270 tablet, Rfl: 1    hydrOXYchloroQUINE (PLAQUENIL) 200 mg tablet, TAKE 1 TABLET BY MOUTH EVERY 12 HOURS, Disp: 180 tablet, Rfl: 0    ibuprofen (ADVIL,MOTRIN) 800 MG tablet, Take 1 tablet (800 mg total) by mouth every 6 (six) hours as needed for Pain., Disp: 20 tablet, Rfl: 0    lancets (ACCU-CHEK FASTCLIX) Misc, 1 Device by Misc.(Non-Drug; Combo Route) route Daily., Disp: 100 each, Rfl: 4    latanoprost 0.005 % ophthalmic solution, INSTILL 1 DROP INTO BOTH EYES EVERY EVENING, Disp: 7.5 mL, Rfl: 3    methotrexate 2.5 MG Tab, Take 5 tablets (12.5 mg total) by mouth every 7 days., Disp: 60 tablet, Rfl: 1    MILK THISTLE ORAL, Take 250 mg by mouth once daily. , Disp: , Rfl:     predniSONE (DELTASONE) 2.5 MG tablet, Take 1 tablet (2.5 mg total) by mouth 2 (two) times daily., Disp: 60 tablet, Rfl: 5    timolol maleate 0.5% (TIMOPTIC) 0.5 % Drop, Place 1 drop into the left eye once daily., Disp: 10 mL, Rfl: 1    triamcinolone acetonide 0.1% (KENALOG) 0.1 % paste, SMARTSIG:Sparingly Topical 3 Times Daily, Disp: , Rfl:       Objective:     Vitals:  Blood pressure (!) 143/80, weight 74 kg (163 lb 3.2 oz).    Physical Examination:   GEN: wn/wd male in no apparent distress  SKIN: no rashes,  no sclerodactyly, no Raynaud's, no periungual erythema, no digital tip ulcerations, no nailbed pitting  HEAD: no alopecia, no scalp tenderness, no temporal artery tenderness or induration.  EYES: no pallor, no icterus, PERRLA  ENT:  no thrush, no mucosal dryness or ulcerations, adequate oral hygiene & dentition.  NECK: supple x 6, no masses, no thyromegaly, no lymphadenopathy.  CV:   S1 and S2 regular, no murmurs, gallop or rubs  CHEST: Normal respiratory effort;  normal breath sounds/no adventitious sounds. No signs of consolidation.  ABD: non-tender and non-distended; soft; normal bowel sounds; no rebound/guarding or tenderness. No hepatosplenomegaly.  Musculoskeletal:  No evidence of active synovitis  EXTREM: no clubbing, cyanosis or edema. normal pulses.  NEURO:  grossly intact; motor/sensory WNL; no tremors  PSYCH:  normal mood, affect and behavior    Labs:   Lab Results   Component Value Date    WBC 5.5 07/18/2022    HGB 13.1 (L) 07/18/2022    HCT 39.0 07/18/2022    MCV 89.2 07/18/2022     07/18/2022   CMP@  Sodium   Date Value Ref Range Status   07/18/2022 140 135 - 146 mmol/L Final   04/24/2019 139 134 - 144 mmol/L      Potassium   Date Value Ref Range Status   07/18/2022 4.7 3.5 - 5.3 mmol/L Final     Chloride   Date Value Ref Range Status   07/18/2022 104 98 - 110 mmol/L Final   04/24/2019 102 98 - 110 mmol/L      CO2   Date Value Ref Range Status   07/18/2022 29 20 - 32 mmol/L Final     Glucose   Date Value Ref Range Status   07/18/2022 106 (H) 65 - 99 mg/dL Final     Comment:                   Fasting reference interval     For someone without known diabetes, a glucose value  between 100 and 125 mg/dL is consistent with  prediabetes and should be confirmed with a  follow-up test.        04/24/2019 85 70 - 99 mg/dL      BUN   Date Value Ref Range Status   07/18/2022 14 7 - 25 mg/dL Final     Creatinine   Date Value Ref Range Status   07/18/2022 0.90 0.70 - 1.28 mg/dL Final   04/24/2019 0.82 0.60 -  1.40 mg/dL      Calcium   Date Value Ref Range Status   07/18/2022 9.5 8.6 - 10.3 mg/dL Final     Total Protein   Date Value Ref Range Status   07/18/2022 6.2 6.1 - 8.1 g/dL Final     Albumin   Date Value Ref Range Status   07/18/2022 4.1 3.6 - 5.1 g/dL Final   04/24/2019 4.1 3.1 - 4.7 g/dL      Total Bilirubin   Date Value Ref Range Status   07/18/2022 0.9 0.2 - 1.2 mg/dL Final     Alkaline Phosphatase   Date Value Ref Range Status   07/06/2021 42 (L) 55 - 135 U/L Final     AST   Date Value Ref Range Status   07/18/2022 12 10 - 35 U/L Final     ALT   Date Value Ref Range Status   07/18/2022 19 9 - 46 U/L Final     CRP   Date Value Ref Range Status   02/07/2022 0.4 <8.0 mg/L Final     Rheumatoid Factor   Date Value Ref Range Status   09/26/2017 <10.0 0.0 - 13.9 IU/mL      Comment:     Performed at: [facility] [address], [id number] Ragland, [place], Phone:  [phone]       Radiology/Diagnostic Studies:    None    Assessment/Discussion/Plan:   72 y.o. male with seronegative rheumatoid arthritis-controlled on methotrexate 12.5 mg weekly, hydroxychloroquine 400 mg daily, and prednisone 2.5 mg twice daily    PLAN:  I will continue his medications without change.  Routine follow-up blood testing was ordered and should be done 1 week prior to his upcoming visit with his new rheumatologist    RTC:  I will see him back through the end of the year if needed        Electronically signed by Calderon Barrett MD      Patient notified that this office will be closing December 22, 2022. They should begin looking for another rheumatologist as soon as possible.  A list with the names and contact details of rheumatologists in the surrounding area was provided.

## 2022-08-22 NOTE — TELEPHONE ENCOUNTER
----- Message from Josef Pierce MA sent at 8/22/2022 11:18 AM CDT -----  Contact: KARIN RIOS JR. [[id number]]  Type: Needs Medical Advice    Who Called:KARIN RIOS JR. [[id number]]  Best Call Back Number: [phone] or [phone]  Inquiry/Question: Please call regarding upcoming visit and lab work  Thank you~

## 2022-08-26 NOTE — PROGRESS NOTES
"Subjective:       Patient ID: Kyrie Norton Jr. is a 72 y.o. male.    Chief Complaint: Disease Management    HPI     This is a 72-year-old man with past medical history of HLD, T2DM, and seronegative rheumatoid arthritis on methotrexate 12.5 mg weekly, hydroxychloroquine 400 mg daily, and prednisone 2.5 mg twice daily who was last seen by Dr. Barrett on 08/22/2022.     The patient states that when he was first diagnosed, he had bilateral shoulder pain and heaviness in the arms. He was initially told that he had rotator cuff tendinopathy. He then saw Dr Barrett who diagnosed him with seronegative RA in 2017. Per Dr Barrett's notes, polymyalgia rheumatica was initially suspected however, as he was tapering his steroids, swelling and tenderness in the MCP joints became more apparent and seronegative rheumatoid arthritis was suspected. Currently, the patient is doing well.  He denies joint pain and swelling.    Objective:   BP (!) 146/71   Pulse 74   Ht 5' 7" (1.702 m)   Wt 72.8 kg (160 lb 9.7 oz)   BMI 25.15 kg/m²      Physical Exam   HENT:   Head: Normocephalic and atraumatic.   Cardiovascular: Normal rate, regular rhythm and normal heart sounds.   Pulmonary/Chest: Effort normal and breath sounds normal.   Abdominal: Normal appearance.   Musculoskeletal:      Comments: Swollen Joints: Trace swelling 2nd left MCP (1)  Tender Joints: 0   Neurological: He is alert.   Skin: Skin is warm and dry. No rash noted.      No data to display     Labs reviewed by me:  CBC (07/18/2022): WNL   CMP WNL     Assessment:       1. Rheumatoid arthritis involving multiple sites with positive rheumatoid factor    2. Other specified disorders of bone density and structure, multiple sites    3. Chronic inflammatory arthritis        This is a 72-year-old man with past medical history of HLD, T2DM, and seronegative rheumatoid arthritis on methotrexate 12.5 mg weekly, hydroxychloroquine 400 mg daily, and prednisone 2.5 mg twice daily. He was " initially diagnosed in 2017, and was at first suspected to have PMR as he presented with bilateral shoulder heaviness.  However, during steroid taper, he developed tenderness and swelling in his MCP joints.  He was then diagnosed with seronegative rheumatoid arthritis.  He has done well on his current medication regimen since then.  Plan to decrease prednisone to 2.5 mg once daily. CDAI is consistent with remission.     Plan:       Problem List Items Addressed This Visit          Immunology/Multi System    Rheumatoid arthritis - Primary    Relevant Orders    CBC Auto Differential    Sedimentation rate    Comprehensive Metabolic Panel    C-Reactive Protein    HIV 1/2 Ag/Ab (4th Gen)    Hepatitis B surface antigen    HBcAB    Hepatitis B surface antibody    Hepatitis C antibody    Strongyloides IgG Antibodies    Quantiferon Gold TB    RPR    X-ray Arthritis Survey    DXA Bone Density Spine And Hip     Other Visit Diagnoses       Other specified disorders of bone density and structure, multiple sites        Relevant Orders    DXA Bone Density Spine And Hip    Chronic inflammatory arthritis        Relevant Medications    predniSONE (DELTASONE) 2.5 MG tablet          - Decrease prednisone to 2.5mg once a day   - Continue MTX 12.5mg weekly plus folic acid daily and HCQ 400mg daily (plan to increase MTX as needed as steroids are tapered)  - CBC, CMP, ESR, CRP, pre-DMARD labs  - Xray arthritis survey  - Plaquenil eye exam 4/2022  - Immunizations: Zoster live (2015), flu (1/22), PCV13 (2020), patient needs COVID and PPV 23  - Obtain DEXA scan      Follow up in 2 months    40 minutes of total time spent on the encounter, which includes face to face time and non-face to face time preparing to see the patient (eg, review of tests), Obtaining and/or reviewing separately obtained history, Documenting clinical information in the electronic or other health record, Independently interpreting results (not separately reported) and  communicating results to the patient/family/caregiver, or Care coordination (not separately reported).       Nitza Ruiz M.D.  Rheumatology Dept  [place], [place]

## 2022-08-29 ENCOUNTER — OFFICE VISIT (OUTPATIENT)
Dept: RHEUMATOLOGY | Facility: CLINIC | Age: 72
End: 2022-08-29
Payer: MEDICARE

## 2022-08-29 VITALS
WEIGHT: 160.63 LBS | SYSTOLIC BLOOD PRESSURE: 146 MMHG | HEART RATE: 74 BPM | DIASTOLIC BLOOD PRESSURE: 71 MMHG | BODY MASS INDEX: 25.21 KG/M2 | HEIGHT: 67 IN

## 2022-08-29 DIAGNOSIS — M85.89 OTHER SPECIFIED DISORDERS OF BONE DENSITY AND STRUCTURE, MULTIPLE SITES: ICD-10-CM

## 2022-08-29 DIAGNOSIS — M19.90 CHRONIC INFLAMMATORY ARTHRITIS: ICD-10-CM

## 2022-08-29 DIAGNOSIS — M05.79 RHEUMATOID ARTHRITIS INVOLVING MULTIPLE SITES WITH POSITIVE RHEUMATOID FACTOR: Primary | ICD-10-CM

## 2022-08-29 PROCEDURE — 1126F PR PAIN SEVERITY QUANTIFIED, NO PAIN PRESENT: ICD-10-PCS | Mod: CPTII,S$GLB,, | Performed by: STUDENT IN AN ORGANIZED HEALTH CARE EDUCATION/TRAINING PROGRAM

## 2022-08-29 PROCEDURE — 3044F PR MOST RECENT HEMOGLOBIN A1C LEVEL <7.0%: ICD-10-PCS | Mod: CPTII,S$GLB,, | Performed by: STUDENT IN AN ORGANIZED HEALTH CARE EDUCATION/TRAINING PROGRAM

## 2022-08-29 PROCEDURE — 99215 PR OFFICE/OUTPT VISIT, EST, LEVL V, 40-54 MIN: ICD-10-PCS | Mod: S$GLB,,, | Performed by: STUDENT IN AN ORGANIZED HEALTH CARE EDUCATION/TRAINING PROGRAM

## 2022-08-29 PROCEDURE — 99215 OFFICE O/P EST HI 40 MIN: CPT | Mod: S$GLB,,, | Performed by: STUDENT IN AN ORGANIZED HEALTH CARE EDUCATION/TRAINING PROGRAM

## 2022-08-29 PROCEDURE — 1159F PR MEDICATION LIST DOCUMENTED IN MEDICAL RECORD: ICD-10-PCS | Mod: CPTII,S$GLB,, | Performed by: STUDENT IN AN ORGANIZED HEALTH CARE EDUCATION/TRAINING PROGRAM

## 2022-08-29 PROCEDURE — 1159F MED LIST DOCD IN RCRD: CPT | Mod: CPTII,S$GLB,, | Performed by: STUDENT IN AN ORGANIZED HEALTH CARE EDUCATION/TRAINING PROGRAM

## 2022-08-29 PROCEDURE — 3072F PR LOW RISK FOR RETINOPATHY: ICD-10-PCS | Mod: CPTII,S$GLB,, | Performed by: STUDENT IN AN ORGANIZED HEALTH CARE EDUCATION/TRAINING PROGRAM

## 2022-08-29 PROCEDURE — 99999 PR PBB SHADOW E&M-EST. PATIENT-LVL III: ICD-10-PCS | Mod: PBBFAC,,, | Performed by: STUDENT IN AN ORGANIZED HEALTH CARE EDUCATION/TRAINING PROGRAM

## 2022-08-29 PROCEDURE — 3044F HG A1C LEVEL LT 7.0%: CPT | Mod: CPTII,S$GLB,, | Performed by: STUDENT IN AN ORGANIZED HEALTH CARE EDUCATION/TRAINING PROGRAM

## 2022-08-29 PROCEDURE — 3008F BODY MASS INDEX DOCD: CPT | Mod: CPTII,S$GLB,, | Performed by: STUDENT IN AN ORGANIZED HEALTH CARE EDUCATION/TRAINING PROGRAM

## 2022-08-29 PROCEDURE — 99999 PR PBB SHADOW E&M-EST. PATIENT-LVL III: CPT | Mod: PBBFAC,,, | Performed by: STUDENT IN AN ORGANIZED HEALTH CARE EDUCATION/TRAINING PROGRAM

## 2022-08-29 PROCEDURE — 1126F AMNT PAIN NOTED NONE PRSNT: CPT | Mod: CPTII,S$GLB,, | Performed by: STUDENT IN AN ORGANIZED HEALTH CARE EDUCATION/TRAINING PROGRAM

## 2022-08-29 PROCEDURE — 3008F PR BODY MASS INDEX (BMI) DOCUMENTED: ICD-10-PCS | Mod: CPTII,S$GLB,, | Performed by: STUDENT IN AN ORGANIZED HEALTH CARE EDUCATION/TRAINING PROGRAM

## 2022-08-29 PROCEDURE — 3072F LOW RISK FOR RETINOPATHY: CPT | Mod: CPTII,S$GLB,, | Performed by: STUDENT IN AN ORGANIZED HEALTH CARE EDUCATION/TRAINING PROGRAM

## 2022-08-29 RX ORDER — PREDNISONE 2.5 MG/1
2.5 TABLET ORAL DAILY
Qty: 60 TABLET | Refills: 5 | Status: SHIPPED | OUTPATIENT
Start: 2022-08-29 | End: 2023-03-10

## 2022-09-02 ENCOUNTER — CLINICAL SUPPORT (OUTPATIENT)
Dept: FAMILY MEDICINE | Facility: CLINIC | Age: 72
End: 2022-09-02
Payer: MEDICARE

## 2022-09-02 ENCOUNTER — TELEPHONE (OUTPATIENT)
Dept: RHEUMATOLOGY | Facility: CLINIC | Age: 72
End: 2022-09-02
Payer: MEDICARE

## 2022-09-02 DIAGNOSIS — Z23 NEED FOR PNEUMOCOCCAL VACCINE: Primary | ICD-10-CM

## 2022-09-02 PROCEDURE — G0009 ADMIN PNEUMOCOCCAL VACCINE: HCPCS | Mod: S$GLB,,, | Performed by: PHYSICIAN ASSISTANT

## 2022-09-02 PROCEDURE — 90677 PCV20 VACCINE IM: CPT | Mod: S$GLB,,, | Performed by: PHYSICIAN ASSISTANT

## 2022-09-02 PROCEDURE — G0009 PNEUMOCOCCAL CONJUGATE VACCINE 20-VALENT: ICD-10-PCS | Mod: S$GLB,,, | Performed by: PHYSICIAN ASSISTANT

## 2022-09-02 PROCEDURE — 90677 PNEUMOCOCCAL CONJUGATE VACCINE 20-VALENT: ICD-10-PCS | Mod: S$GLB,,, | Performed by: PHYSICIAN ASSISTANT

## 2022-09-02 NOTE — TELEPHONE ENCOUNTER
----- Message from Gracie Mccall sent at 9/2/2022  9:23 AM CDT -----  Contact: Patient  Type:  Needs Medical Advice    Who Called:  Patient    Would the patient rather a call back or a response via [facility]?  Call    Best Call Back Number:  [phone] (home)     Additional Information: Patient is requesting the office send all his labs to [facility] in [place]     Please call to advise

## 2022-09-12 ENCOUNTER — TELEPHONE (OUTPATIENT)
Dept: RHEUMATOLOGY | Facility: CLINIC | Age: 72
End: 2022-09-12
Payer: MEDICARE

## 2022-09-12 DIAGNOSIS — M05.79 RHEUMATOID ARTHRITIS INVOLVING MULTIPLE SITES WITH POSITIVE RHEUMATOID FACTOR: Primary | ICD-10-CM

## 2022-09-12 NOTE — TELEPHONE ENCOUNTER
----- Message from Josselyn Dao sent at 9/12/2022  7:54 AM CDT -----  Regarding: orders  Contact: patient  Patient want to know if office can send bloodwork orders to [facility] laboratory, any questions please call back at [phone] (home)     Case number [id number]

## 2022-09-13 ENCOUNTER — HOSPITAL ENCOUNTER (OUTPATIENT)
Dept: RADIOLOGY | Facility: CLINIC | Age: 72
Discharge: HOME | End: 2022-09-13
Attending: STUDENT IN AN ORGANIZED HEALTH CARE EDUCATION/TRAINING PROGRAM
Payer: MEDICARE

## 2022-09-13 DIAGNOSIS — M85.89 OTHER SPECIFIED DISORDERS OF BONE DENSITY AND STRUCTURE, MULTIPLE SITES: ICD-10-CM

## 2022-09-13 DIAGNOSIS — M05.79 RHEUMATOID ARTHRITIS INVOLVING MULTIPLE SITES WITH POSITIVE RHEUMATOID FACTOR: ICD-10-CM

## 2022-09-13 PROCEDURE — 77080 DXA BONE DENSITY AXIAL: CPT | Mod: 26,,, | Performed by: RADIOLOGY

## 2022-09-13 PROCEDURE — 77077 JOINT SURVEY SINGLE VIEW: CPT | Mod: TC,PO

## 2022-09-13 PROCEDURE — 77080 DXA BONE DENSITY AXIAL: CPT | Mod: TC,PO

## 2022-09-13 PROCEDURE — 77077 JOINT SURVEY SINGLE VIEW: CPT | Mod: 26,,, | Performed by: RADIOLOGY

## 2022-09-13 PROCEDURE — 77080 DEXA BONE DENSITY SPINE HIP: ICD-10-PCS | Mod: 26,,, | Performed by: RADIOLOGY

## 2022-09-13 PROCEDURE — 77077 XR ARTHRITIS SURVEY: ICD-10-PCS | Mod: 26,,, | Performed by: RADIOLOGY

## 2022-09-14 ENCOUNTER — TELEPHONE (OUTPATIENT)
Dept: RHEUMATOLOGY | Facility: CLINIC | Age: 72
End: 2022-09-14
Payer: MEDICARE

## 2022-09-14 DIAGNOSIS — R76.12 POSITIVE QUANTIFERON-TB GOLD TEST: Primary | ICD-10-CM

## 2022-09-14 DIAGNOSIS — D72.819 LEUKOPENIA, UNSPECIFIED TYPE: Primary | ICD-10-CM

## 2022-09-14 NOTE — TELEPHONE ENCOUNTER
----- Message from Colleen Mclaughlin sent at 9/14/2022 12:07 PM CDT -----  Contact: Self  Type:  Test Results    Who Called:  Patient  Name of Test (Lab/Mammo/Etc):  Labs  Date of Test:  09/13  Ordering Provider:  Willy  Where the test was performed:  [facility]  Best Call Back Number:  [phone]  Additional Information:  Please call pt back to advise, worried about WBC. Thank You

## 2022-09-21 ENCOUNTER — TELEPHONE (OUTPATIENT)
Dept: RHEUMATOLOGY | Facility: CLINIC | Age: 72
End: 2022-09-21
Payer: MEDICARE

## 2022-09-28 ENCOUNTER — LAB VISIT (OUTPATIENT)
Dept: LAB | Facility: HOSPITAL | Age: 72
End: 2022-09-28
Attending: STUDENT IN AN ORGANIZED HEALTH CARE EDUCATION/TRAINING PROGRAM
Payer: MEDICARE

## 2022-09-28 DIAGNOSIS — R76.12 POSITIVE QUANTIFERON-TB GOLD TEST: ICD-10-CM

## 2022-09-28 DIAGNOSIS — D72.819 LEUKOPENIA, UNSPECIFIED TYPE: ICD-10-CM

## 2022-09-28 LAB
BASOPHILS # BLD AUTO: 0.02 K/UL (ref 0–0.2)
BASOPHILS NFR BLD: 0.4 % (ref 0–1.9)
DIFFERENTIAL METHOD: ABNORMAL
EOSINOPHIL # BLD AUTO: 0.2 K/UL (ref 0–0.5)
EOSINOPHIL NFR BLD: 3.4 % (ref 0–8)
ERYTHROCYTE [DISTWIDTH] IN BLOOD BY AUTOMATED COUNT: 13.8 % (ref 11.5–14.5)
HCT VFR BLD AUTO: 39.2 % (ref 40–54)
HGB BLD-MCNC: 13 G/DL (ref 14–18)
IMM GRANULOCYTES # BLD AUTO: 0.01 K/UL (ref 0–0.04)
IMM GRANULOCYTES NFR BLD AUTO: 0.2 % (ref 0–0.5)
LYMPHOCYTES # BLD AUTO: 1.5 K/UL (ref 1–4.8)
LYMPHOCYTES NFR BLD: 31.2 % (ref 18–48)
MCH RBC QN AUTO: 31 PG (ref 27–31)
MCHC RBC AUTO-ENTMCNC: 33.2 G/DL (ref 32–36)
MCV RBC AUTO: 94 FL (ref 82–98)
MONOCYTES # BLD AUTO: 0.3 K/UL (ref 0.3–1)
MONOCYTES NFR BLD: 6.4 % (ref 4–15)
NEUTROPHILS # BLD AUTO: 2.8 K/UL (ref 1.8–7.7)
NEUTROPHILS NFR BLD: 58.4 % (ref 38–73)
NRBC BLD-RTO: 0 /100 WBC
PLATELET # BLD AUTO: 259 K/UL (ref 150–450)
PMV BLD AUTO: 9.9 FL (ref 9.2–12.9)
RBC # BLD AUTO: 4.19 M/UL (ref 4.6–6.2)
WBC # BLD AUTO: 4.71 K/UL (ref 3.9–12.7)

## 2022-09-28 PROCEDURE — 85025 COMPLETE CBC W/AUTO DIFF WBC: CPT | Performed by: STUDENT IN AN ORGANIZED HEALTH CARE EDUCATION/TRAINING PROGRAM

## 2022-09-28 PROCEDURE — 36415 COLL VENOUS BLD VENIPUNCTURE: CPT | Mod: PO | Performed by: STUDENT IN AN ORGANIZED HEALTH CARE EDUCATION/TRAINING PROGRAM

## 2022-09-30 ENCOUNTER — TELEPHONE (OUTPATIENT)
Dept: RHEUMATOLOGY | Facility: CLINIC | Age: 72
End: 2022-09-30
Payer: MEDICARE

## 2022-09-30 DIAGNOSIS — R76.12 POSITIVE QUANTIFERON-TB GOLD TEST: Primary | ICD-10-CM

## 2022-09-30 NOTE — TELEPHONE ENCOUNTER
----- Message from Darshana Coyle sent at 9/30/2022  9:38 AM CDT -----  Type: Needs Medical Advice  Who Called: Pt   Symptoms (please be specific):   How long has patient had these symptoms:    Pharmacy name and phone #:    Best Call Back Number: [phone]  Additional Information: Pt requesting a call back for a BBT lab work, pt requesting to go to [place] to have it done.

## 2022-10-21 NOTE — PROGRESS NOTES
"Subjective:       Patient ID: Kyrie Norton Jr. is a 72 y.o. male.    Chief Complaint: Disease Management    HPI    This is a 72-year-old man with past medical history of HLD, T2DM, and seronegative rheumatoid arthritis on methotrexate 12.5 mg weekly, hydroxychloroquine 400 mg daily, and prednisone 2.5 mg daily. He was initially diagnosed in 2017, and was at first suspected to have PMR as he presented with bilateral shoulder heaviness.  However, during steroid taper, he developed tenderness and swelling in his MCP joints.  He was then diagnosed with seronegative rheumatoid arthritis.  He has done well on his current medication regimen since then. He was last seen on 8/29/22 and CDAI was consistent with remission at that time. He is still doing well and denies joint pain and swelling.     Objective:   BP (!) 149/69   Pulse 67   Ht 5' 7" (1.702 m)   Wt 72.8 kg (160 lb 7.9 oz)   BMI 25.14 kg/m²      Physical Exam   Constitutional: normal appearance.   HENT:   Head: Normocephalic and atraumatic.   Musculoskeletal:      Comments: No synovitis, dactylitis, enthesitis, effusions   Neurological: He is alert.   Skin: Skin is warm and dry. No rash noted.      No data to display    Labs reviewed by me:   T spot borderline, quantiferon positive  Negative HIV, hepatitis B and C      Assessment:       1. Rheumatoid arthritis involving multiple sites with positive rheumatoid factor    2. Leukopenia, unspecified type    3. Positive QuantiFERON-TB Gold test    4. Encounter for long-term (current) drug use        This is a 72-year-old man with past medical history of HLD, T2DM, and seronegative rheumatoid arthritis diagnosed several years ago by Dr Barrett and on methotrexate 12.5 mg weekly, hydroxychloroquine 400 mg daily, and prednisone 2.5 mg daily. CDAI is consistent with remission. Increase MTX to 15mg weekly, discontinue prednisone and continue HCQ. He has a positive quantiferon and a borderline T-spot and was in the " [employer] in the [place]. He states that he has always had a positive TB test but is asymptomatic (no cough, fever, shortness of breath). Will refer to ID.     Plan:       Problem List Items Addressed This Visit          Immunology/Multi System    Rheumatoid arthritis - Primary    Relevant Orders    CBC Auto Differential    Comprehensive Metabolic Panel    Sedimentation rate    C-Reactive Protein     Other Visit Diagnoses       Leukopenia, unspecified type        Positive QuantiFERON-TB Gold test        Encounter for long-term (current) drug use              - Refer to ID  - Increase MTX to 15mg weekly, discontinue prednisone and continue HCQ  - CBC, CMP, ESR, CRP every 12 weeks  - Xray arthritis survey (9/2022) no obvious erosive changes  - Plaquenil eye exam 4/2022  - Pre-DMARD labs completed 9/2022  - Immunizations: Zoster live (2015), flu (1/22), PCV13 (2020), patient needs COVID and PPV 23  - DEXA scan (8/2022) Normal BMD     Follow up in 4 months    20 minutes of total time spent on the encounter, which includes face to face time and non-face to face time preparing to see the patient (eg, review of tests), Obtaining and/or reviewing separately obtained history, Documenting clinical information in the electronic or other health record, Independently interpreting results (not separately reported) and communicating results to the patient/family/caregiver, or Care coordination (not separately reported).       Nitza Ruiz M.D.  Rheumatology Dept  [place], [place]

## 2022-10-24 ENCOUNTER — TELEPHONE (OUTPATIENT)
Dept: RHEUMATOLOGY | Facility: CLINIC | Age: 72
End: 2022-10-24

## 2022-10-24 ENCOUNTER — OFFICE VISIT (OUTPATIENT)
Dept: RHEUMATOLOGY | Facility: CLINIC | Age: 72
End: 2022-10-24
Payer: MEDICARE

## 2022-10-24 VITALS
HEIGHT: 67 IN | HEART RATE: 67 BPM | BODY MASS INDEX: 25.19 KG/M2 | WEIGHT: 160.5 LBS | DIASTOLIC BLOOD PRESSURE: 69 MMHG | SYSTOLIC BLOOD PRESSURE: 149 MMHG

## 2022-10-24 DIAGNOSIS — Z79.899 ENCOUNTER FOR LONG-TERM (CURRENT) DRUG USE: ICD-10-CM

## 2022-10-24 DIAGNOSIS — R76.12 POSITIVE QUANTIFERON-TB GOLD TEST: ICD-10-CM

## 2022-10-24 DIAGNOSIS — M05.79 RHEUMATOID ARTHRITIS INVOLVING MULTIPLE SITES WITH POSITIVE RHEUMATOID FACTOR: Primary | ICD-10-CM

## 2022-10-24 DIAGNOSIS — D72.819 LEUKOPENIA, UNSPECIFIED TYPE: ICD-10-CM

## 2022-10-24 PROCEDURE — 3288F FALL RISK ASSESSMENT DOCD: CPT | Mod: CPTII,S$GLB,, | Performed by: STUDENT IN AN ORGANIZED HEALTH CARE EDUCATION/TRAINING PROGRAM

## 2022-10-24 PROCEDURE — 1159F PR MEDICATION LIST DOCUMENTED IN MEDICAL RECORD: ICD-10-PCS | Mod: CPTII,S$GLB,, | Performed by: STUDENT IN AN ORGANIZED HEALTH CARE EDUCATION/TRAINING PROGRAM

## 2022-10-24 PROCEDURE — 3044F HG A1C LEVEL LT 7.0%: CPT | Mod: CPTII,S$GLB,, | Performed by: STUDENT IN AN ORGANIZED HEALTH CARE EDUCATION/TRAINING PROGRAM

## 2022-10-24 PROCEDURE — 3288F PR FALLS RISK ASSESSMENT DOCUMENTED: ICD-10-PCS | Mod: CPTII,S$GLB,, | Performed by: STUDENT IN AN ORGANIZED HEALTH CARE EDUCATION/TRAINING PROGRAM

## 2022-10-24 PROCEDURE — 3072F PR LOW RISK FOR RETINOPATHY: ICD-10-PCS | Mod: CPTII,S$GLB,, | Performed by: STUDENT IN AN ORGANIZED HEALTH CARE EDUCATION/TRAINING PROGRAM

## 2022-10-24 PROCEDURE — 3072F LOW RISK FOR RETINOPATHY: CPT | Mod: CPTII,S$GLB,, | Performed by: STUDENT IN AN ORGANIZED HEALTH CARE EDUCATION/TRAINING PROGRAM

## 2022-10-24 PROCEDURE — 3078F DIAST BP <80 MM HG: CPT | Mod: CPTII,S$GLB,, | Performed by: STUDENT IN AN ORGANIZED HEALTH CARE EDUCATION/TRAINING PROGRAM

## 2022-10-24 PROCEDURE — 3077F PR MOST RECENT SYSTOLIC BLOOD PRESSURE >= 140 MM HG: ICD-10-PCS | Mod: CPTII,S$GLB,, | Performed by: STUDENT IN AN ORGANIZED HEALTH CARE EDUCATION/TRAINING PROGRAM

## 2022-10-24 PROCEDURE — 3077F SYST BP >= 140 MM HG: CPT | Mod: CPTII,S$GLB,, | Performed by: STUDENT IN AN ORGANIZED HEALTH CARE EDUCATION/TRAINING PROGRAM

## 2022-10-24 PROCEDURE — 3044F PR MOST RECENT HEMOGLOBIN A1C LEVEL <7.0%: ICD-10-PCS | Mod: CPTII,S$GLB,, | Performed by: STUDENT IN AN ORGANIZED HEALTH CARE EDUCATION/TRAINING PROGRAM

## 2022-10-24 PROCEDURE — 1159F MED LIST DOCD IN RCRD: CPT | Mod: CPTII,S$GLB,, | Performed by: STUDENT IN AN ORGANIZED HEALTH CARE EDUCATION/TRAINING PROGRAM

## 2022-10-24 PROCEDURE — 1101F PT FALLS ASSESS-DOCD LE1/YR: CPT | Mod: CPTII,S$GLB,, | Performed by: STUDENT IN AN ORGANIZED HEALTH CARE EDUCATION/TRAINING PROGRAM

## 2022-10-24 PROCEDURE — 99999 PR PBB SHADOW E&M-EST. PATIENT-LVL IV: CPT | Mod: PBBFAC,,, | Performed by: STUDENT IN AN ORGANIZED HEALTH CARE EDUCATION/TRAINING PROGRAM

## 2022-10-24 PROCEDURE — 1126F PR PAIN SEVERITY QUANTIFIED, NO PAIN PRESENT: ICD-10-PCS | Mod: CPTII,S$GLB,, | Performed by: STUDENT IN AN ORGANIZED HEALTH CARE EDUCATION/TRAINING PROGRAM

## 2022-10-24 PROCEDURE — 1126F AMNT PAIN NOTED NONE PRSNT: CPT | Mod: CPTII,S$GLB,, | Performed by: STUDENT IN AN ORGANIZED HEALTH CARE EDUCATION/TRAINING PROGRAM

## 2022-10-24 PROCEDURE — 99214 PR OFFICE/OUTPT VISIT, EST, LEVL IV, 30-39 MIN: ICD-10-PCS | Mod: S$GLB,,, | Performed by: STUDENT IN AN ORGANIZED HEALTH CARE EDUCATION/TRAINING PROGRAM

## 2022-10-24 PROCEDURE — 1101F PR PT FALLS ASSESS DOC 0-1 FALLS W/OUT INJ PAST YR: ICD-10-PCS | Mod: CPTII,S$GLB,, | Performed by: STUDENT IN AN ORGANIZED HEALTH CARE EDUCATION/TRAINING PROGRAM

## 2022-10-24 PROCEDURE — 3078F PR MOST RECENT DIASTOLIC BLOOD PRESSURE < 80 MM HG: ICD-10-PCS | Mod: CPTII,S$GLB,, | Performed by: STUDENT IN AN ORGANIZED HEALTH CARE EDUCATION/TRAINING PROGRAM

## 2022-10-24 PROCEDURE — 99214 OFFICE O/P EST MOD 30 MIN: CPT | Mod: S$GLB,,, | Performed by: STUDENT IN AN ORGANIZED HEALTH CARE EDUCATION/TRAINING PROGRAM

## 2022-10-24 PROCEDURE — 99999 PR PBB SHADOW E&M-EST. PATIENT-LVL IV: ICD-10-PCS | Mod: PBBFAC,,, | Performed by: STUDENT IN AN ORGANIZED HEALTH CARE EDUCATION/TRAINING PROGRAM

## 2022-10-24 ASSESSMENT — ROUTINE ASSESSMENT OF PATIENT INDEX DATA (RAPID3)
FATIGUE SCORE: 0
PSYCHOLOGICAL DISTRESS SCORE: 0
PAIN SCORE: 0
MDHAQ FUNCTION SCORE: 0
TOTAL RAPID3 SCORE: 0
PATIENT GLOBAL ASSESSMENT SCORE: 0

## 2022-11-02 ENCOUNTER — TELEPHONE (OUTPATIENT)
Dept: RHEUMATOLOGY | Facility: CLINIC | Age: 72
End: 2022-11-02
Payer: MEDICARE

## 2022-11-02 NOTE — TELEPHONE ENCOUNTER
----- Message from Dharmesh Malik sent at 11/2/2022  8:47 AM CDT -----  Contact: pt at [phone]  Type: Needs Medical Advice  Who Called:  pt    Best Call Back Number: [phone]    Additional Information: pt is calling the office requesting a call back. He states he stop the medicaton as directed but now everything is cramping up. Please call back and advise.

## 2022-11-02 NOTE — TELEPHONE ENCOUNTER
Mr Norton stopped Prednisone on 10/24/22 and was off 10 days. Last 2 days his hands, knees and shoulders have cramped and are painful. He states he took one Prednisone this morning and will take another this evening as it has already given relief. Will route to provider.

## 2022-12-20 ENCOUNTER — LAB VISIT (OUTPATIENT)
Dept: LAB | Facility: HOSPITAL | Age: 72
End: 2022-12-20
Attending: STUDENT IN AN ORGANIZED HEALTH CARE EDUCATION/TRAINING PROGRAM
Payer: MEDICARE

## 2022-12-20 DIAGNOSIS — M05.79 RHEUMATOID ARTHRITIS INVOLVING MULTIPLE SITES WITH POSITIVE RHEUMATOID FACTOR: ICD-10-CM

## 2022-12-20 LAB
ALBUMIN SERPL BCP-MCNC: 3.6 G/DL (ref 3.5–5.2)
ALP SERPL-CCNC: 51 U/L (ref 55–135)
ALT SERPL W/O P-5'-P-CCNC: 22 U/L (ref 10–44)
ANION GAP SERPL CALC-SCNC: 8 MMOL/L (ref 8–16)
AST SERPL-CCNC: 19 U/L (ref 10–40)
BASOPHILS # BLD AUTO: 0.03 K/UL (ref 0–0.2)
BASOPHILS NFR BLD: 0.5 % (ref 0–1.9)
BILIRUB SERPL-MCNC: 0.7 MG/DL (ref 0.1–1)
BUN SERPL-MCNC: 8 MG/DL (ref 8–23)
CALCIUM SERPL-MCNC: 9.1 MG/DL (ref 8.7–10.5)
CHLORIDE SERPL-SCNC: 103 MMOL/L (ref 95–110)
CO2 SERPL-SCNC: 28 MMOL/L (ref 23–29)
CREAT SERPL-MCNC: 0.8 MG/DL (ref 0.5–1.4)
CRP SERPL-MCNC: 0.5 MG/L (ref 0–8.2)
DIFFERENTIAL METHOD: ABNORMAL
EOSINOPHIL # BLD AUTO: 0.4 K/UL (ref 0–0.5)
EOSINOPHIL NFR BLD: 6.6 % (ref 0–8)
ERYTHROCYTE [DISTWIDTH] IN BLOOD BY AUTOMATED COUNT: 13.2 % (ref 11.5–14.5)
ERYTHROCYTE [SEDIMENTATION RATE] IN BLOOD BY WESTERGREN METHOD: 6 MM/HR (ref 0–10)
EST. GFR  (NO RACE VARIABLE): >60 ML/MIN/1.73 M^2
GLUCOSE SERPL-MCNC: 112 MG/DL (ref 70–110)
HCT VFR BLD AUTO: 38.7 % (ref 40–54)
HGB BLD-MCNC: 12.2 G/DL (ref 14–18)
IMM GRANULOCYTES # BLD AUTO: 0.01 K/UL (ref 0–0.04)
IMM GRANULOCYTES NFR BLD AUTO: 0.2 % (ref 0–0.5)
LYMPHOCYTES # BLD AUTO: 1.6 K/UL (ref 1–4.8)
LYMPHOCYTES NFR BLD: 27.8 % (ref 18–48)
MCH RBC QN AUTO: 28.7 PG (ref 27–31)
MCHC RBC AUTO-ENTMCNC: 31.5 G/DL (ref 32–36)
MCV RBC AUTO: 91 FL (ref 82–98)
MONOCYTES # BLD AUTO: 0.6 K/UL (ref 0.3–1)
MONOCYTES NFR BLD: 9.5 % (ref 4–15)
NEUTROPHILS # BLD AUTO: 3.3 K/UL (ref 1.8–7.7)
NEUTROPHILS NFR BLD: 55.4 % (ref 38–73)
NRBC BLD-RTO: 0 /100 WBC
PLATELET # BLD AUTO: 290 K/UL (ref 150–450)
PMV BLD AUTO: 10.4 FL (ref 9.2–12.9)
POTASSIUM SERPL-SCNC: 4.9 MMOL/L (ref 3.5–5.1)
PROT SERPL-MCNC: 6.4 G/DL (ref 6–8.4)
RBC # BLD AUTO: 4.25 M/UL (ref 4.6–6.2)
SODIUM SERPL-SCNC: 139 MMOL/L (ref 136–145)
WBC # BLD AUTO: 5.9 K/UL (ref 3.9–12.7)

## 2022-12-20 PROCEDURE — 36415 COLL VENOUS BLD VENIPUNCTURE: CPT | Mod: PO | Performed by: STUDENT IN AN ORGANIZED HEALTH CARE EDUCATION/TRAINING PROGRAM

## 2022-12-20 PROCEDURE — 85651 RBC SED RATE NONAUTOMATED: CPT | Mod: PO | Performed by: STUDENT IN AN ORGANIZED HEALTH CARE EDUCATION/TRAINING PROGRAM

## 2022-12-20 PROCEDURE — 85025 COMPLETE CBC W/AUTO DIFF WBC: CPT | Performed by: STUDENT IN AN ORGANIZED HEALTH CARE EDUCATION/TRAINING PROGRAM

## 2022-12-20 PROCEDURE — 86140 C-REACTIVE PROTEIN: CPT | Performed by: STUDENT IN AN ORGANIZED HEALTH CARE EDUCATION/TRAINING PROGRAM

## 2022-12-20 PROCEDURE — 80053 COMPREHEN METABOLIC PANEL: CPT | Performed by: STUDENT IN AN ORGANIZED HEALTH CARE EDUCATION/TRAINING PROGRAM

## 2023-01-24 ENCOUNTER — TELEPHONE (OUTPATIENT)
Dept: FAMILY MEDICINE | Facility: CLINIC | Age: 73
End: 2023-01-24

## 2023-01-24 DIAGNOSIS — E78.5 HYPERLIPIDEMIA ASSOCIATED WITH TYPE 2 DIABETES MELLITUS: ICD-10-CM

## 2023-01-24 DIAGNOSIS — E11.69 HYPERLIPIDEMIA ASSOCIATED WITH TYPE 2 DIABETES MELLITUS: ICD-10-CM

## 2023-01-24 DIAGNOSIS — E11.9 TYPE 2 DIABETES MELLITUS WITHOUT RETINOPATHY: ICD-10-CM

## 2023-01-24 DIAGNOSIS — Z79.899 ENCOUNTER FOR LONG-TERM (CURRENT) USE OF OTHER MEDICATIONS: Primary | ICD-10-CM

## 2023-01-30 ENCOUNTER — OFFICE VISIT (OUTPATIENT)
Dept: FAMILY MEDICINE | Facility: CLINIC | Age: 73
End: 2023-01-30
Payer: MEDICARE

## 2023-01-30 VITALS
DIASTOLIC BLOOD PRESSURE: 66 MMHG | HEIGHT: 67 IN | HEART RATE: 76 BPM | WEIGHT: 158 LBS | SYSTOLIC BLOOD PRESSURE: 104 MMHG | BODY MASS INDEX: 24.8 KG/M2

## 2023-01-30 DIAGNOSIS — E78.5 HYPERLIPIDEMIA ASSOCIATED WITH TYPE 2 DIABETES MELLITUS: ICD-10-CM

## 2023-01-30 DIAGNOSIS — E11.69 HYPERLIPIDEMIA ASSOCIATED WITH TYPE 2 DIABETES MELLITUS: ICD-10-CM

## 2023-01-30 DIAGNOSIS — E11.9 TYPE 2 DIABETES MELLITUS WITHOUT RETINOPATHY: Primary | ICD-10-CM

## 2023-01-30 LAB — HBA1C MFR BLD: 6 %

## 2023-01-30 PROCEDURE — 1159F MED LIST DOCD IN RCRD: CPT | Mod: CPTII,S$GLB,, | Performed by: PHYSICIAN ASSISTANT

## 2023-01-30 PROCEDURE — 3078F DIAST BP <80 MM HG: CPT | Mod: CPTII,S$GLB,, | Performed by: PHYSICIAN ASSISTANT

## 2023-01-30 PROCEDURE — 83036 HEMOGLOBIN GLYCOSYLATED A1C: CPT | Mod: QW,,, | Performed by: PHYSICIAN ASSISTANT

## 2023-01-30 PROCEDURE — 3078F PR MOST RECENT DIASTOLIC BLOOD PRESSURE < 80 MM HG: ICD-10-PCS | Mod: CPTII,S$GLB,, | Performed by: PHYSICIAN ASSISTANT

## 2023-01-30 PROCEDURE — 3044F HG A1C LEVEL LT 7.0%: CPT | Mod: CPTII,S$GLB,, | Performed by: PHYSICIAN ASSISTANT

## 2023-01-30 PROCEDURE — 1159F PR MEDICATION LIST DOCUMENTED IN MEDICAL RECORD: ICD-10-PCS | Mod: CPTII,S$GLB,, | Performed by: PHYSICIAN ASSISTANT

## 2023-01-30 PROCEDURE — 3008F PR BODY MASS INDEX (BMI) DOCUMENTED: ICD-10-PCS | Mod: CPTII,S$GLB,, | Performed by: PHYSICIAN ASSISTANT

## 2023-01-30 PROCEDURE — 3074F PR MOST RECENT SYSTOLIC BLOOD PRESSURE < 130 MM HG: ICD-10-PCS | Mod: CPTII,S$GLB,, | Performed by: PHYSICIAN ASSISTANT

## 2023-01-30 PROCEDURE — 83036 POCT HEMOGLOBIN A1C: ICD-10-PCS | Mod: QW,,, | Performed by: PHYSICIAN ASSISTANT

## 2023-01-30 PROCEDURE — 3074F SYST BP LT 130 MM HG: CPT | Mod: CPTII,S$GLB,, | Performed by: PHYSICIAN ASSISTANT

## 2023-01-30 PROCEDURE — 99214 PR OFFICE/OUTPT VISIT, EST, LEVL IV, 30-39 MIN: ICD-10-PCS | Mod: S$GLB,,, | Performed by: PHYSICIAN ASSISTANT

## 2023-01-30 PROCEDURE — 3008F BODY MASS INDEX DOCD: CPT | Mod: CPTII,S$GLB,, | Performed by: PHYSICIAN ASSISTANT

## 2023-01-30 PROCEDURE — 99214 OFFICE O/P EST MOD 30 MIN: CPT | Mod: S$GLB,,, | Performed by: PHYSICIAN ASSISTANT

## 2023-01-30 PROCEDURE — 3044F PR MOST RECENT HEMOGLOBIN A1C LEVEL <7.0%: ICD-10-PCS | Mod: CPTII,S$GLB,, | Performed by: PHYSICIAN ASSISTANT

## 2023-01-30 RX ORDER — ATORVASTATIN CALCIUM 40 MG/1
40 TABLET, FILM COATED ORAL DAILY
Qty: 90 TABLET | Refills: 4 | Status: SHIPPED | OUTPATIENT
Start: 2023-01-30 | End: 2023-08-01 | Stop reason: SDUPTHER

## 2023-01-30 RX ORDER — GLIPIZIDE AND METFORMIN HCL 5; 500 MG/1; MG/1
TABLET, FILM COATED ORAL
Qty: 270 TABLET | Refills: 1 | Status: SHIPPED | OUTPATIENT
Start: 2023-01-30 | End: 2023-08-01 | Stop reason: SDUPTHER

## 2023-01-30 NOTE — PROGRESS NOTES
SUBJECTIVE:    Patient ID: Kyrie Norton Jr. is a 72 y.o. male.    Chief Complaint: Diabetes (No bottles, went over meds verbally, Eye exam req 20/20// SW)    This is a 72 year old male who presents today for regular diabetic check up. Patient has no major concerns at this time. Denies any recent illness, chest pain, or shortness of breath. A1c today is 6.0. He does continue to see Dr. Aguilera for his rheumatoid arthritis. Maintains on Plaquenil, methotrexate and low-dose prednisone. He is working to reduce the prednisone now. Occasionally has some aches in the shoulder more with the lower doses.      Office Visit on 01/30/2023   Component Date Value Ref Range Status    Hemoglobin A1C, POC 01/30/2023 6.0  % Final   Lab Visit on 12/20/2022   Component Date Value Ref Range Status    WBC 12/20/2022 5.90  3.90 - 12.70 K/uL Final    RBC 12/20/2022 4.25 (L)  4.60 - 6.20 M/uL Final    Hemoglobin 12/20/2022 12.2 (L)  14.0 - 18.0 g/dL Final    Hematocrit 12/20/2022 38.7 (L)  40.0 - 54.0 % Final    MCV 12/20/2022 91  82 - 98 fL Final    MCH 12/20/2022 28.7  27.0 - 31.0 pg Final    MCHC 12/20/2022 31.5 (L)  32.0 - 36.0 g/dL Final    RDW 12/20/2022 13.2  11.5 - 14.5 % Final    Platelets 12/20/2022 290  150 - 450 K/uL Final    MPV 12/20/2022 10.4  9.2 - 12.9 fL Final    Immature Granulocytes 12/20/2022 0.2  0.0 - 0.5 % Final    Gran # (ANC) 12/20/2022 3.3  1.8 - 7.7 K/uL Final    Immature Grans (Abs) 12/20/2022 0.01  0.00 - 0.04 K/uL Final    Lymph # 12/20/2022 1.6  1.0 - 4.8 K/uL Final    Mono # 12/20/2022 0.6  0.3 - 1.0 K/uL Final    Eos # 12/20/2022 0.4  0.0 - 0.5 K/uL Final    Baso # 12/20/2022 0.03  0.00 - 0.20 K/uL Final    nRBC 12/20/2022 0  0 /100 WBC Final    Gran % 12/20/2022 55.4  38.0 - 73.0 % Final    Lymph % 12/20/2022 27.8  18.0 - 48.0 % Final    Mono % 12/20/2022 9.5  4.0 - 15.0 % Final    Eosinophil % 12/20/2022 6.6  0.0 - 8.0 % Final    Basophil % 12/20/2022 0.5  0.0 - 1.9 % Final    Differential Method  12/20/2022 Automated   Final    Sodium 12/20/2022 139  136 - 145 mmol/L Final    Potassium 12/20/2022 4.9  3.5 - 5.1 mmol/L Final    Chloride 12/20/2022 103  95 - 110 mmol/L Final    CO2 12/20/2022 28  23 - 29 mmol/L Final    Glucose 12/20/2022 112 (H)  70 - 110 mg/dL Final    BUN 12/20/2022 8  8 - 23 mg/dL Final    Creatinine 12/20/2022 0.8  0.5 - 1.4 mg/dL Final    Calcium 12/20/2022 9.1  8.7 - 10.5 mg/dL Final    Total Protein 12/20/2022 6.4  6.0 - 8.4 g/dL Final    Albumin 12/20/2022 3.6  3.5 - 5.2 g/dL Final    Total Bilirubin 12/20/2022 0.7  0.1 - 1.0 mg/dL Final    Alkaline Phosphatase 12/20/2022 51 (L)  55 - 135 U/L Final    AST 12/20/2022 19  10 - 40 U/L Final    ALT 12/20/2022 22  10 - 44 U/L Final    Anion Gap 12/20/2022 8  8 - 16 mmol/L Final    eGFR 12/20/2022 >60.0  >60 mL/min/1.73 m^2 Final    Sed Rate 12/20/2022 6  0 - 10 mm/Hr Final    CRP 12/20/2022 0.5  0.0 - 8.2 mg/L Final   Lab Visit on 10/03/2022   Component Date Value Ref Range Status    T-SPOT TB Screening Test 10/03/2022 See result image under hyperlink   Final   Lab Visit on 09/28/2022   Component Date Value Ref Range Status    WBC 09/28/2022 4.71  3.90 - 12.70 K/uL Final    RBC 09/28/2022 4.19 (L)  4.60 - 6.20 M/uL Final    Hemoglobin 09/28/2022 13.0 (L)  14.0 - 18.0 g/dL Final    Hematocrit 09/28/2022 39.2 (L)  40.0 - 54.0 % Final    MCV 09/28/2022 94  82 - 98 fL Final    MCH 09/28/2022 31.0  27.0 - 31.0 pg Final    MCHC 09/28/2022 33.2  32.0 - 36.0 g/dL Final    RDW 09/28/2022 13.8  11.5 - 14.5 % Final    Platelets 09/28/2022 259  150 - 450 K/uL Final    MPV 09/28/2022 9.9  9.2 - 12.9 fL Final    Immature Granulocytes 09/28/2022 0.2  0.0 - 0.5 % Final    Gran # (ANC) 09/28/2022 2.8  1.8 - 7.7 K/uL Final    Immature Grans (Abs) 09/28/2022 0.01  0.00 - 0.04 K/uL Final    Lymph # 09/28/2022 1.5  1.0 - 4.8 K/uL Final    Mono # 09/28/2022 0.3  0.3 - 1.0 K/uL Final    Eos # 09/28/2022 0.2  0.0 - 0.5 K/uL Final    Baso # 09/28/2022 0.02  0.00 -  0.20 K/uL Final    nRBC 09/28/2022 0  0 /100 WBC Final    Gran % 09/28/2022 58.4  38.0 - 73.0 % Final    Lymph % 09/28/2022 31.2  18.0 - 48.0 % Final    Mono % 09/28/2022 6.4  4.0 - 15.0 % Final    Eosinophil % 09/28/2022 3.4  0.0 - 8.0 % Final    Basophil % 09/28/2022 0.4  0.0 - 1.9 % Final    Differential Method 09/28/2022 Automated   Final   Lab Visit on 09/13/2022   Component Date Value Ref Range Status    WBC 09/13/2022 2.83 (L)  3.90 - 12.70 K/uL Final    RBC 09/13/2022 4.46 (L)  4.60 - 6.20 M/uL Final    Hemoglobin 09/13/2022 13.7 (L)  14.0 - 18.0 g/dL Final    Hematocrit 09/13/2022 42.0  40.0 - 54.0 % Final    MCV 09/13/2022 94  82 - 98 fL Final    MCH 09/13/2022 30.7  27.0 - 31.0 pg Final    MCHC 09/13/2022 32.6  32.0 - 36.0 g/dL Final    RDW 09/13/2022 13.8  11.5 - 14.5 % Final    Platelets 09/13/2022 233  150 - 450 K/uL Final    MPV 09/13/2022 10.2  9.2 - 12.9 fL Final    Immature Granulocytes 09/13/2022 0.4  0.0 - 0.5 % Final    Gran # (ANC) 09/13/2022 1.2 (L)  1.8 - 7.7 K/uL Final    Immature Grans (Abs) 09/13/2022 0.01  0.00 - 0.04 K/uL Final    Lymph # 09/13/2022 1.1  1.0 - 4.8 K/uL Final    Mono # 09/13/2022 0.5  0.3 - 1.0 K/uL Final    Eos # 09/13/2022 0.1  0.0 - 0.5 K/uL Final    Baso # 09/13/2022 0.01  0.00 - 0.20 K/uL Final    nRBC 09/13/2022 0  0 /100 WBC Final    Gran % 09/13/2022 43.3  38.0 - 73.0 % Final    Lymph % 09/13/2022 37.5  18.0 - 48.0 % Final    Mono % 09/13/2022 16.6 (H)  4.0 - 15.0 % Final    Eosinophil % 09/13/2022 1.8  0.0 - 8.0 % Final    Basophil % 09/13/2022 0.4  0.0 - 1.9 % Final    Differential Method 09/13/2022 Automated   Final    Sodium 09/13/2022 139  136 - 145 mmol/L Final    Potassium 09/13/2022 4.9  3.5 - 5.1 mmol/L Final    Chloride 09/13/2022 105  95 - 110 mmol/L Final    CO2 09/13/2022 28  23 - 29 mmol/L Final    Glucose 09/13/2022 91  70 - 110 mg/dL Final    BUN 09/13/2022 11  8 - 23 mg/dL Final    Creatinine 09/13/2022 0.8  0.5 - 1.4 mg/dL Final    Calcium  09/13/2022 10.2  8.7 - 10.5 mg/dL Final    Total Protein 09/13/2022 7.3  6.0 - 8.4 g/dL Final    Albumin 09/13/2022 4.4  3.5 - 5.2 g/dL Final    Total Bilirubin 09/13/2022 0.7  0.1 - 1.0 mg/dL Final    Alkaline Phosphatase 09/13/2022 49 (L)  55 - 135 U/L Final    AST 09/13/2022 22  10 - 40 U/L Final    ALT 09/13/2022 39  10 - 44 U/L Final    Anion Gap 09/13/2022 6 (L)  8 - 16 mmol/L Final    eGFR 09/13/2022 >60.0  >60 mL/min/1.73 m^2 Final    Sed Rate 09/13/2022 4  0 - 10 mm/Hr Final    CRP 09/13/2022 0.4  0.0 - 8.2 mg/L Final    HIV 1/2 Ag/Ab 09/13/2022 Non-reactive  Non-reactive Final    Hepatitis B Surface Ag 09/13/2022 Non-reactive  Non-reactive Final    Hep B Core Total Ab 09/13/2022 Non-reactive  Non-reactive Final    Hep B S Ab 09/13/2022 <3.00  mIU/mL Final    Hep B S Ab 09/13/2022 Non-reactive   Final    Hepatitis C Ab 09/13/2022 Non-reactive  Non-reactive Final   Lab Visit on 09/13/2022   Component Date Value Ref Range Status    NIL 09/13/2022 0.05594  IU/mL Final    TB1 - Nil 09/13/2022 1.705  IU/mL Final    TB2 - Nil 09/13/2022 1.555  IU/mL Final    Mitogen - Nil 09/13/2022 9.895  IU/mL Final    TB Gold Plus 09/13/2022 Positive (A)  Negative Final       Past Medical History:   Diagnosis Date    Diabetes mellitus type II     Hyperlipidemia     Hypertension      History reviewed. No pertinent surgical history.  Family History   Problem Relation Age of Onset    Blindness Neg Hx     Glaucoma Neg Hx     Macular degeneration Neg Hx        Marital Status:   Alcohol History:  reports current alcohol use.  Tobacco History:  reports that he has never smoked. He has never used smokeless tobacco.  Drug History:  reports no history of drug use.    Review of patient's allergies indicates:   Allergen Reactions    Parafon forte Nausea Only       Current Outpatient Medications:     aspirin (ECOTRIN) 81 MG EC tablet, Take 81 mg by mouth once daily., Disp: , Rfl:     blood sugar diagnostic (ACCU-CHEK CJ PLUS TEST  STRP) Strp, 1 strip by Subdermal route Daily., Disp: 100 strip, Rfl: 4    coenzyme Q10 10 mg capsule, Take 10 mg by mouth once daily., Disp: , Rfl:     fish oil-omega-3 fatty acids 300-1,000 mg capsule, Take 2 g by mouth once daily., Disp: , Rfl:     folic acid (FOLVITE) 1 MG tablet, Take 1 tablet (1,000 mcg total) by mouth once daily., Disp: 90 tablet, Rfl: 3    hydrOXYchloroQUINE (PLAQUENIL) 200 mg tablet, TAKE 1 TABLET BY MOUTH EVERY 12 HOURS, Disp: 180 tablet, Rfl: 1    lancets (ACCU-CHEK FASTCLIX) Misc, 1 Device by Misc.(Non-Drug; Combo Route) route Daily., Disp: 100 each, Rfl: 4    latanoprost 0.005 % ophthalmic solution, INSTILL 1 DROP INTO BOTH EYES EVERY EVENING, Disp: 7.5 mL, Rfl: 3    methotrexate 2.5 MG Tab, Take 5 tablets (12.5 mg total) by mouth every 7 days., Disp: 60 tablet, Rfl: 1    MILK THISTLE ORAL, Take 250 mg by mouth once daily. , Disp: , Rfl:     predniSONE (DELTASONE) 2.5 MG tablet, Take 1 tablet (2.5 mg total) by mouth once daily., Disp: 60 tablet, Rfl: 5    timolol maleate 0.5% (TIMOPTIC) 0.5 % Drop, PLACE 1 DROP INTO THE LEFT EYE ONCE DAILY., Disp: 10 mL, Rfl: 1    triamcinolone acetonide 0.1% (KENALOG) 0.1 % paste, SMARTSIG:Sparingly Topical 3 Times Daily, Disp: , Rfl:     atorvastatin (LIPITOR) 40 MG tablet, Take 1 tablet (40 mg total) by mouth once daily., Disp: 90 tablet, Rfl: 4    glipizide-metformin (METAGLIP) 5-500 mg per tablet, 1 tablet in the am, 2 tablets in the pm., Disp: 270 tablet, Rfl: 1    Review of Systems   Constitutional:  Negative for activity change, fatigue, fever and unexpected weight change.   HENT:  Negative for congestion.    Respiratory:  Negative for apnea, cough, chest tightness and shortness of breath.    Cardiovascular:  Negative for chest pain and palpitations.   Gastrointestinal:  Negative for abdominal distention and abdominal pain.   Endocrine: Negative for polyuria.   Genitourinary:  Negative for difficulty urinating and dysuria.   Musculoskeletal:   "Negative for arthralgias and back pain.   Neurological:  Negative for dizziness, weakness, light-headedness and headaches.        Objective:      Vitals:    01/30/23 0712   BP: 104/66   Pulse: 76   Weight: 71.7 kg (158 lb)   Height: 5' 7" (1.702 m)     Physical Exam  Constitutional:       General: He is not in acute distress.     Appearance: He is well-developed.   HENT:      Head: Normocephalic and atraumatic.   Eyes:      Pupils: Pupils are equal, round, and reactive to light.   Neck:      Thyroid: No thyromegaly.   Cardiovascular:      Rate and Rhythm: Normal rate and regular rhythm.      Pulses: Normal pulses.      Heart sounds: Normal heart sounds.   Pulmonary:      Effort: Pulmonary effort is normal.      Breath sounds: Normal breath sounds.   Abdominal:      General: Bowel sounds are normal. There is no distension.      Palpations: Abdomen is soft.      Tenderness: There is no abdominal tenderness.   Musculoskeletal:         General: Normal range of motion.      Cervical back: Normal range of motion and neck supple.   Skin:     General: Skin is warm and dry.      Capillary Refill: Capillary refill takes less than 2 seconds.      Findings: No erythema or rash.   Neurological:      Mental Status: He is alert and oriented to person, place, and time.      Cranial Nerves: No cranial nerve deficit.         Assessment:       1. Type 2 diabetes mellitus without retinopathy    2. Hyperlipidemia associated with type 2 diabetes mellitus           Plan:       Type 2 diabetes mellitus without retinopathy  Comments:  See very well managed at 6%.  Continue current medications as is  Orders:  -     Hemoglobin A1C, POCT  -     glipizide-metformin (METAGLIP) 5-500 mg per tablet; 1 tablet in the am, 2 tablets in the pm.  Dispense: 270 tablet; Refill: 1    Hyperlipidemia associated with type 2 diabetes mellitus  Comments:  Labs reviewed and remain stable, refills today.  Orders:  -     atorvastatin (LIPITOR) 40 MG tablet; Take 1 " tablet (40 mg total) by mouth once daily.  Dispense: 90 tablet; Refill: 4      Follow up in about 6 months (around 7/30/2023) for Diabetic Check-Up, Annual Physical.        1/30/2023 Marco Antonio Loyola PA-C

## 2023-01-31 LAB
CHOLEST SERPL-MCNC: 168 MG/DL
CHOLEST/HDLC SERPL: 3.1 (CALC)
HBA1C MFR BLD: 5.8 % OF TOTAL HGB
HDLC SERPL-MCNC: 55 MG/DL
LDLC SERPL CALC-MCNC: 89 MG/DL (CALC)
NONHDLC SERPL-MCNC: 113 MG/DL (CALC)
TRIGL SERPL-MCNC: 143 MG/DL

## 2023-02-23 ENCOUNTER — TELEPHONE (OUTPATIENT)
Dept: RHEUMATOLOGY | Facility: CLINIC | Age: 73
End: 2023-02-23
Payer: MEDICARE

## 2023-02-23 NOTE — TELEPHONE ENCOUNTER
----- Message from Farideh Viera sent at 2/23/2023  9:11 AM CST -----  Contact: called at [phone]  Type: Needs Medical Advice  Who Called:  Pt  Best Call Back Number: [phone]  Additional Information: The pt is calling to see if his appt set for 02/24/2023, he would like to know can he come in for about 10:00 or 10:30. He has to drop his grandson off at school for 8:30. Please call back and advise.

## 2023-03-08 NOTE — PROGRESS NOTES
"Subjective:       Patient ID: Kyrie Norton Jr. is a 73 y.o. male.    Chief Complaint: Disease Management    HPI    This is a 73-year-old man with past medical history of HLD, T2DM, positive quantiferon with borderline T-spot but no symptoms, and seronegative rheumatoid arthritis diagnosed several years ago by Dr Barrett and on methotrexate 12.5 mg weekly, hydroxychloroquine 400 mg daily, and prednisone 2.5 mg daily. He was last seen in 10/2022 and CDAI was consistent with remission at that time. MTX was increased to 15mg weekly. He is still taking prednisone and Plaquenil He was referred to ID for his positive TB test and history of being in the [employer] in the 1960s, but he did not go. Currently, he reports pain in his right shoulder but otherwise denies joint pain and swelling.     Objective:   /75   Pulse 67   Ht 5' 7" (1.702 m)   Wt 70.8 kg (156 lb)   BMI 24.43 kg/m²      Physical Exam   Constitutional: normal appearance.   HENT:   Head: Normocephalic and atraumatic.   Musculoskeletal:      Comments: No synovitis, dactylitis, enthesitis, effusions  Negative Yergason's and empty can signs bilaterally   Neurological: He is alert.   Skin: Skin is warm and dry.   No skin thickening, telangiectasias, calcinosis, psoriasiform lesions, lupoid lesions        No data to display     Assessment:       1. Rheumatoid arthritis involving multiple sites with positive rheumatoid factor    2. Medication monitoring encounter    3. Positive QuantiFERON-TB Gold test    4. Drug-induced immunodeficiency    5. Other specified disorders of bone density and structure, multiple sites        This is a 73-year-old man with past medical history of HLD, T2DM, positive quantiferon with borderline T-spot but no symptoms, and seronegative rheumatoid arthritis diagnosed several years ago by Dr Barrett and on methotrexate 15 mg weekly, hydroxychloroquine 400 mg daily, and prednisone 2.5 mg daily. Currently, he complains of pain " in his right shoulder. Plan to increase MTX to 17.5mg weekly and discontinue prednisone.     Plan:       Problem List Items Addressed This Visit          Immunology/Multi System    Rheumatoid arthritis - Primary    Relevant Medications    methotrexate 2.5 MG Tab    Other Relevant Orders    CBC Auto Differential    Comprehensive Metabolic Panel    Sedimentation rate    C-Reactive Protein     Other Visit Diagnoses       Medication monitoring encounter        Relevant Orders    CBC Auto Differential    Comprehensive Metabolic Panel    Sedimentation rate    C-Reactive Protein    Positive QuantiFERON-TB Gold test        Relevant Orders    Ambulatory referral/consult to Infectious Disease    Drug-induced immunodeficiency        Other specified disorders of bone density and structure, multiple sites              - Refer to ID for positive TB test  - Increase MTX to 17.5mg weekly plus folic acid daily   - Continue HCQ  - Discontinue prednisone  - Right shoulder IA steroid injection completed today  - CBC, CMP, ESR, CRP every 12 weeks  - Xray arthritis survey (9/2022) no obvious erosive changes  - Plaquenil eye exam 4/2022  - Pre-DMARD labs completed 9/2022  - Immunizations: Zoster live (2015), flu (1/22), PCV13 (2020), PCV20 (9/2022)  - DEXA scan (8/2022) Normal BMD    Follow up in 3 months    30 minutes of total time spent on the encounter, which includes face to face time and non-face to face time preparing to see the patient (eg, review of tests), Obtaining and/or reviewing separately obtained history, Documenting clinical information in the electronic or other health record, Independently interpreting results (not separately reported) and communicating results to the patient/family/caregiver, or Care coordination (not separately reported).       Nitza Ruiz M.D.  Rheumatology Dept  [place], [place]

## 2023-03-10 ENCOUNTER — TELEPHONE (OUTPATIENT)
Dept: INFECTIOUS DISEASES | Facility: CLINIC | Age: 73
End: 2023-03-10
Payer: MEDICARE

## 2023-03-10 ENCOUNTER — OFFICE VISIT (OUTPATIENT)
Dept: RHEUMATOLOGY | Facility: CLINIC | Age: 73
End: 2023-03-10
Payer: MEDICARE

## 2023-03-10 VITALS
DIASTOLIC BLOOD PRESSURE: 75 MMHG | BODY MASS INDEX: 24.48 KG/M2 | WEIGHT: 156 LBS | HEART RATE: 67 BPM | HEIGHT: 67 IN | SYSTOLIC BLOOD PRESSURE: 124 MMHG

## 2023-03-10 DIAGNOSIS — M05.79 RHEUMATOID ARTHRITIS INVOLVING MULTIPLE SITES WITH POSITIVE RHEUMATOID FACTOR: Primary | ICD-10-CM

## 2023-03-10 DIAGNOSIS — M15.9 PRIMARY OSTEOARTHRITIS INVOLVING MULTIPLE JOINTS: ICD-10-CM

## 2023-03-10 DIAGNOSIS — Z79.899 DRUG-INDUCED IMMUNODEFICIENCY: ICD-10-CM

## 2023-03-10 DIAGNOSIS — R76.12 POSITIVE QUANTIFERON-TB GOLD TEST: ICD-10-CM

## 2023-03-10 DIAGNOSIS — M85.89 OTHER SPECIFIED DISORDERS OF BONE DENSITY AND STRUCTURE, MULTIPLE SITES: ICD-10-CM

## 2023-03-10 DIAGNOSIS — D84.821 DRUG-INDUCED IMMUNODEFICIENCY: ICD-10-CM

## 2023-03-10 DIAGNOSIS — Z51.81 MEDICATION MONITORING ENCOUNTER: ICD-10-CM

## 2023-03-10 PROCEDURE — 1125F AMNT PAIN NOTED PAIN PRSNT: CPT | Mod: CPTII,S$GLB,, | Performed by: STUDENT IN AN ORGANIZED HEALTH CARE EDUCATION/TRAINING PROGRAM

## 2023-03-10 PROCEDURE — 3074F SYST BP LT 130 MM HG: CPT | Mod: CPTII,S$GLB,, | Performed by: STUDENT IN AN ORGANIZED HEALTH CARE EDUCATION/TRAINING PROGRAM

## 2023-03-10 PROCEDURE — 99215 OFFICE O/P EST HI 40 MIN: CPT | Mod: 25,S$GLB,, | Performed by: STUDENT IN AN ORGANIZED HEALTH CARE EDUCATION/TRAINING PROGRAM

## 2023-03-10 PROCEDURE — 1159F MED LIST DOCD IN RCRD: CPT | Mod: CPTII,S$GLB,, | Performed by: STUDENT IN AN ORGANIZED HEALTH CARE EDUCATION/TRAINING PROGRAM

## 2023-03-10 PROCEDURE — 1160F PR REVIEW ALL MEDS BY PRESCRIBER/CLIN PHARMACIST DOCUMENTED: ICD-10-PCS | Mod: CPTII,S$GLB,, | Performed by: STUDENT IN AN ORGANIZED HEALTH CARE EDUCATION/TRAINING PROGRAM

## 2023-03-10 PROCEDURE — 3008F PR BODY MASS INDEX (BMI) DOCUMENTED: ICD-10-PCS | Mod: CPTII,S$GLB,, | Performed by: STUDENT IN AN ORGANIZED HEALTH CARE EDUCATION/TRAINING PROGRAM

## 2023-03-10 PROCEDURE — 1159F PR MEDICATION LIST DOCUMENTED IN MEDICAL RECORD: ICD-10-PCS | Mod: CPTII,S$GLB,, | Performed by: STUDENT IN AN ORGANIZED HEALTH CARE EDUCATION/TRAINING PROGRAM

## 2023-03-10 PROCEDURE — 1125F PR PAIN SEVERITY QUANTIFIED, PAIN PRESENT: ICD-10-PCS | Mod: CPTII,S$GLB,, | Performed by: STUDENT IN AN ORGANIZED HEALTH CARE EDUCATION/TRAINING PROGRAM

## 2023-03-10 PROCEDURE — 20610 LARGE JOINT ASPIRATION/INJECTION: R GLENOHUMERAL: ICD-10-PCS | Mod: RT,S$GLB,, | Performed by: STUDENT IN AN ORGANIZED HEALTH CARE EDUCATION/TRAINING PROGRAM

## 2023-03-10 PROCEDURE — 3008F BODY MASS INDEX DOCD: CPT | Mod: CPTII,S$GLB,, | Performed by: STUDENT IN AN ORGANIZED HEALTH CARE EDUCATION/TRAINING PROGRAM

## 2023-03-10 PROCEDURE — 99999 PR PBB SHADOW E&M-EST. PATIENT-LVL III: ICD-10-PCS | Mod: PBBFAC,,, | Performed by: STUDENT IN AN ORGANIZED HEALTH CARE EDUCATION/TRAINING PROGRAM

## 2023-03-10 PROCEDURE — 20610 DRAIN/INJ JOINT/BURSA W/O US: CPT | Mod: RT,S$GLB,, | Performed by: STUDENT IN AN ORGANIZED HEALTH CARE EDUCATION/TRAINING PROGRAM

## 2023-03-10 PROCEDURE — 3078F PR MOST RECENT DIASTOLIC BLOOD PRESSURE < 80 MM HG: ICD-10-PCS | Mod: CPTII,S$GLB,, | Performed by: STUDENT IN AN ORGANIZED HEALTH CARE EDUCATION/TRAINING PROGRAM

## 2023-03-10 PROCEDURE — 3074F PR MOST RECENT SYSTOLIC BLOOD PRESSURE < 130 MM HG: ICD-10-PCS | Mod: CPTII,S$GLB,, | Performed by: STUDENT IN AN ORGANIZED HEALTH CARE EDUCATION/TRAINING PROGRAM

## 2023-03-10 PROCEDURE — 3044F PR MOST RECENT HEMOGLOBIN A1C LEVEL <7.0%: ICD-10-PCS | Mod: CPTII,S$GLB,, | Performed by: STUDENT IN AN ORGANIZED HEALTH CARE EDUCATION/TRAINING PROGRAM

## 2023-03-10 PROCEDURE — 3078F DIAST BP <80 MM HG: CPT | Mod: CPTII,S$GLB,, | Performed by: STUDENT IN AN ORGANIZED HEALTH CARE EDUCATION/TRAINING PROGRAM

## 2023-03-10 PROCEDURE — 99215 PR OFFICE/OUTPT VISIT, EST, LEVL V, 40-54 MIN: ICD-10-PCS | Mod: 25,S$GLB,, | Performed by: STUDENT IN AN ORGANIZED HEALTH CARE EDUCATION/TRAINING PROGRAM

## 2023-03-10 PROCEDURE — 3044F HG A1C LEVEL LT 7.0%: CPT | Mod: CPTII,S$GLB,, | Performed by: STUDENT IN AN ORGANIZED HEALTH CARE EDUCATION/TRAINING PROGRAM

## 2023-03-10 PROCEDURE — 99999 PR PBB SHADOW E&M-EST. PATIENT-LVL III: CPT | Mod: PBBFAC,,, | Performed by: STUDENT IN AN ORGANIZED HEALTH CARE EDUCATION/TRAINING PROGRAM

## 2023-03-10 PROCEDURE — 1160F RVW MEDS BY RX/DR IN RCRD: CPT | Mod: CPTII,S$GLB,, | Performed by: STUDENT IN AN ORGANIZED HEALTH CARE EDUCATION/TRAINING PROGRAM

## 2023-03-10 RX ORDER — METHOTREXATE 2.5 MG/1
17.5 TABLET ORAL
Qty: 84 TABLET | Refills: 1 | Status: SHIPPED | OUTPATIENT
Start: 2023-03-10 | End: 2023-09-12 | Stop reason: SDUPTHER

## 2023-03-10 RX ORDER — TRIAMCINOLONE ACETONIDE 40 MG/ML
40 INJECTION, SUSPENSION INTRA-ARTICULAR; INTRAMUSCULAR
Status: DISCONTINUED | OUTPATIENT
Start: 2023-03-10 | End: 2023-03-10 | Stop reason: HOSPADM

## 2023-03-10 RX ORDER — LIDOCAINE HYDROCHLORIDE 10 MG/ML
4 INJECTION INFILTRATION; PERINEURAL
Status: DISCONTINUED | OUTPATIENT
Start: 2023-03-10 | End: 2023-03-10 | Stop reason: HOSPADM

## 2023-03-10 RX ADMIN — LIDOCAINE HYDROCHLORIDE 4 ML: 10 INJECTION INFILTRATION; PERINEURAL at 08:03

## 2023-03-10 RX ADMIN — TRIAMCINOLONE ACETONIDE 40 MG: 40 INJECTION, SUSPENSION INTRA-ARTICULAR; INTRAMUSCULAR at 08:03

## 2023-03-10 ASSESSMENT — ROUTINE ASSESSMENT OF PATIENT INDEX DATA (RAPID3): PATIENT GLOBAL ASSESSMENT SCORE: 2

## 2023-03-10 NOTE — PROCEDURES
Large Joint Aspiration/Injection: R glenohumeral    Date/Time: 3/10/2023 8:00 AM  Performed by: Nitza Ruiz MD  Authorized by: Nitza Ruiz MD     Indications:  Pain  Site marked: the procedure site was marked    Timeout: prior to procedure the correct patient, procedure, and site was verified    Prep: patient was prepped and draped in usual sterile fashion      Details:  Needle Size:  25 G  Ultrasonic Guidance for needle placement?: No    Approach:  Posterior  Location:  Shoulder  Site:  R glenohumeral  Medications:  4 mL LIDOcaine HCL 10 mg/ml (1%) 10 mg/mL (1 %); 40 mg triamcinolone acetonide 40 mg/mL  Patient tolerance:  Patient tolerated the procedure well with no immediate complications

## 2023-03-10 NOTE — TELEPHONE ENCOUNTER
----- Message from Sabine Cook LPN sent at 3/10/2023  9:07 AM CST -----  Regarding: Referral  Good morning, Dr Aguilera hs placed a referral to ID, can someone reach out to patient to schedule  Thanks,  Sabine OKEEFE LPN

## 2023-03-14 ENCOUNTER — TELEPHONE (OUTPATIENT)
Dept: INFECTIOUS DISEASES | Facility: CLINIC | Age: 73
End: 2023-03-14
Payer: MEDICARE

## 2023-03-14 NOTE — TELEPHONE ENCOUNTER
----- Message from Shira Fish sent at 3/14/2023 11:21 AM CDT -----  Regarding: sooner appt  Contact: pt  Type:  Sooner Appointment Request    Caller is requesting a sooner appointment.  Caller declined first available appointment listed below.  Caller will not accept being placed on the waitlist and is requesting a message be sent to doctor.    Name of Caller:  Patient  When is the first available appointment?    Symptoms:  he has a referral and needs to be seen as soon as possible.  Best Call Back Number:  [phone] cell     Additional Information:  Please call pt to schedule thanks!

## 2023-03-14 NOTE — TELEPHONE ENCOUNTER
SPoke to pt and advised him about the TB clinic in [place] at the [facility]. Gave pt the phone number. He will call them to get an apt.

## 2023-03-15 ENCOUNTER — CLINICAL SUPPORT (OUTPATIENT)
Dept: FAMILY MEDICINE | Facility: CLINIC | Age: 73
End: 2023-03-15
Payer: MEDICARE

## 2023-03-15 DIAGNOSIS — E11.9 TYPE 2 DIABETES MELLITUS WITHOUT RETINOPATHY: Primary | ICD-10-CM

## 2023-03-15 DIAGNOSIS — Z79.631 METHOTREXATE, LONG TERM, CURRENT USE: ICD-10-CM

## 2023-03-15 RX ORDER — FOLIC ACID 1 MG/1
1000 TABLET ORAL DAILY
Qty: 90 TABLET | Refills: 3 | Status: SHIPPED | OUTPATIENT
Start: 2023-03-15 | End: 2024-01-26 | Stop reason: SDUPTHER

## 2023-03-15 NOTE — TELEPHONE ENCOUNTER
----- Message from Marisela Staples sent at 3/15/2023 10:01 AM CDT -----  Type:  RX Refill Request    Who Called:  pt   Refill or New Rx:  refill   RX Name and Strength:  folic acid (FOLVITE) 1 MG tablet  How is the patient currently taking it? (ex. 1XDay):  as directed   Is this a 30 day or 90 day RX:  90   Preferred Pharmacy with phone number:    [facility]/pharmacy #[id number] - [place] - [address]  [address] [address]  Phone: [phone] Fax: [phone]     Local or Mail Order:  local   Ordering Provider:  armand  Best Call Back Number:  [phone] (home)     Additional Information: requesting to update all  rx   please advise thank you

## 2023-03-15 NOTE — PROGRESS NOTES
Pt here for refill on lipitor, brought bottle, explained to pt this was sent in on 1/30/2023 with qty 90 w/ 4 refills, pt verbalized understanding and stated he will call his pharmacy.

## 2023-04-13 ENCOUNTER — TELEPHONE (OUTPATIENT)
Dept: FAMILY MEDICINE | Facility: CLINIC | Age: 73
End: 2023-04-13

## 2023-04-13 DIAGNOSIS — Z79.899 ENCOUNTER FOR LONG-TERM (CURRENT) USE OF OTHER MEDICATIONS: Primary | ICD-10-CM

## 2023-04-13 DIAGNOSIS — E78.5 HYPERLIPIDEMIA ASSOCIATED WITH TYPE 2 DIABETES MELLITUS: ICD-10-CM

## 2023-04-13 DIAGNOSIS — E11.69 HYPERLIPIDEMIA ASSOCIATED WITH TYPE 2 DIABETES MELLITUS: ICD-10-CM

## 2023-04-13 DIAGNOSIS — E11.9 TYPE 2 DIABETES MELLITUS WITHOUT RETINOPATHY: ICD-10-CM

## 2023-04-13 NOTE — TELEPHONE ENCOUNTER
----- Message from Laura Beaver MA sent at 4/13/2023  9:26 AM CDT -----  Regarding: lab orders  Patient needs lab orders from [facility]  [phone]

## 2023-05-15 ENCOUNTER — LAB VISIT (OUTPATIENT)
Dept: LAB | Facility: HOSPITAL | Age: 73
End: 2023-05-15
Attending: STUDENT IN AN ORGANIZED HEALTH CARE EDUCATION/TRAINING PROGRAM
Payer: MEDICARE

## 2023-05-15 DIAGNOSIS — M05.79 RHEUMATOID ARTHRITIS INVOLVING MULTIPLE SITES WITH POSITIVE RHEUMATOID FACTOR: ICD-10-CM

## 2023-05-15 LAB
ALBUMIN SERPL BCP-MCNC: 3.7 G/DL (ref 3.5–5.2)
ALP SERPL-CCNC: 56 U/L (ref 55–135)
ALT SERPL W/O P-5'-P-CCNC: 19 U/L (ref 10–44)
ANION GAP SERPL CALC-SCNC: 6 MMOL/L (ref 8–16)
AST SERPL-CCNC: 20 U/L (ref 10–40)
BASOPHILS # BLD AUTO: 0.02 K/UL (ref 0–0.2)
BASOPHILS NFR BLD: 0.3 % (ref 0–1.9)
BILIRUB SERPL-MCNC: 0.4 MG/DL (ref 0.1–1)
BUN SERPL-MCNC: 12 MG/DL (ref 8–23)
CALCIUM SERPL-MCNC: 9.4 MG/DL (ref 8.7–10.5)
CHLORIDE SERPL-SCNC: 104 MMOL/L (ref 95–110)
CO2 SERPL-SCNC: 28 MMOL/L (ref 23–29)
CREAT SERPL-MCNC: 0.9 MG/DL (ref 0.5–1.4)
CRP SERPL-MCNC: 1.3 MG/L (ref 0–8.2)
DIFFERENTIAL METHOD: ABNORMAL
EOSINOPHIL # BLD AUTO: 0.4 K/UL (ref 0–0.5)
EOSINOPHIL NFR BLD: 6.6 % (ref 0–8)
ERYTHROCYTE [DISTWIDTH] IN BLOOD BY AUTOMATED COUNT: 13.8 % (ref 11.5–14.5)
ERYTHROCYTE [SEDIMENTATION RATE] IN BLOOD BY WESTERGREN METHOD: 5 MM/HR (ref 0–10)
EST. GFR  (NO RACE VARIABLE): >60 ML/MIN/1.73 M^2
GLUCOSE SERPL-MCNC: 220 MG/DL (ref 70–110)
HCT VFR BLD AUTO: 37.4 % (ref 40–54)
HGB BLD-MCNC: 12.6 G/DL (ref 14–18)
IMM GRANULOCYTES # BLD AUTO: 0.01 K/UL (ref 0–0.04)
IMM GRANULOCYTES NFR BLD AUTO: 0.2 % (ref 0–0.5)
LYMPHOCYTES # BLD AUTO: 1.6 K/UL (ref 1–4.8)
LYMPHOCYTES NFR BLD: 27.3 % (ref 18–48)
MCH RBC QN AUTO: 29.3 PG (ref 27–31)
MCHC RBC AUTO-ENTMCNC: 33.7 G/DL (ref 32–36)
MCV RBC AUTO: 87 FL (ref 82–98)
MONOCYTES # BLD AUTO: 0.5 K/UL (ref 0.3–1)
MONOCYTES NFR BLD: 9 % (ref 4–15)
NEUTROPHILS # BLD AUTO: 3.3 K/UL (ref 1.8–7.7)
NEUTROPHILS NFR BLD: 56.6 % (ref 38–73)
NRBC BLD-RTO: 0 /100 WBC
PLATELET # BLD AUTO: 244 K/UL (ref 150–450)
PMV BLD AUTO: 10.2 FL (ref 9.2–12.9)
POTASSIUM SERPL-SCNC: 4.2 MMOL/L (ref 3.5–5.1)
PROT SERPL-MCNC: 6.6 G/DL (ref 6–8.4)
RBC # BLD AUTO: 4.3 M/UL (ref 4.6–6.2)
SODIUM SERPL-SCNC: 138 MMOL/L (ref 136–145)
WBC # BLD AUTO: 5.89 K/UL (ref 3.9–12.7)

## 2023-05-15 PROCEDURE — 80053 COMPREHEN METABOLIC PANEL: CPT | Performed by: STUDENT IN AN ORGANIZED HEALTH CARE EDUCATION/TRAINING PROGRAM

## 2023-05-15 PROCEDURE — 36415 COLL VENOUS BLD VENIPUNCTURE: CPT | Mod: PO | Performed by: STUDENT IN AN ORGANIZED HEALTH CARE EDUCATION/TRAINING PROGRAM

## 2023-05-15 PROCEDURE — 86140 C-REACTIVE PROTEIN: CPT | Performed by: STUDENT IN AN ORGANIZED HEALTH CARE EDUCATION/TRAINING PROGRAM

## 2023-05-15 PROCEDURE — 85025 COMPLETE CBC W/AUTO DIFF WBC: CPT | Performed by: STUDENT IN AN ORGANIZED HEALTH CARE EDUCATION/TRAINING PROGRAM

## 2023-05-15 PROCEDURE — 85651 RBC SED RATE NONAUTOMATED: CPT | Mod: PO | Performed by: STUDENT IN AN ORGANIZED HEALTH CARE EDUCATION/TRAINING PROGRAM

## 2023-06-02 NOTE — PROGRESS NOTES
"Subjective:      Patient ID: Kyrie Norton Jr. is a 73 y.o. male.    Chief Complaint: Disease Management    HPI    Rheumatologic History:   - Diagnosis/es:   - seronegative rheumatoid arthritis  - Positive serologies: -  - Negative serologies: RF, CCP  - Infectious screening labs:  Hepatitis-B, C (09/2022); positive TB gold plus (09/2022)  - Imaging:   - DEXA scan (8/2022) Normal BMD   - Xray arthritis survey (9/2022) no obvious erosive changes  - Previous Treatments:   - Prednisone   - HCQ 400mg daily  - Current Treatments:    - MTX 17.5mg weekly plus folic acid daily   - Humira  - Plaquenil eye exam: No toxicity (4/2022)    Interval History:   He was last seen in 3/2023 and MTX was increased to 17.5mg at that time. Right shoulder steroid injection was completed. He was referred to ID for his borderline T spot and states that he has completed TB prophylaxis. He will complete prophylactic therapy on Friday. Currently, he complains of significant pain and stiffness in his hands, left > right.     Objective:   /72   Pulse 75   Ht 5' 7" (1.702 m)   Wt 74.8 kg (165 lb)   BMI 25.84 kg/m²   Physical Exam   Constitutional: normal appearance.   HENT:   Head: Normocephalic and atraumatic.   Pulmonary/Chest: Effort normal.   Musculoskeletal:      Comments: Tenderness on palpation of bilateral 2nd and 3rd PIPs, reduced range of motion of bilateral fingers, difficulty making a fist with both hands   Neurological: He is alert.      6/5/2023   Tender (JIMENEZ-28) 4 / 28    Swollen (JIMENEZ-28) 0 / 28    Provider Global --   Patient Global 50 mm   ESR 5 mm/hr   CRP 1.3 mg/L   JIMENEZ-28 (ESR) 2.95 (Low disease activity)   JIMENEZ-28 (CRP) 3.08 (Low disease activity)   CDAI Score --     Assessment:     1. Rheumatoid arthritis involving multiple sites with positive rheumatoid factor    2. Medication monitoring encounter    3. Positive QuantiFERON-TB Gold test    4. Drug-induced immunodeficiency    5. Primary osteoarthritis involving " multiple joints    6. Encounter for long-term (current) drug use    7. High risk medication use      This is a 73-year-old man with past medical history of HLD, T2DM, positive quantiferon with borderline T-spot but no symptoms s/p prophylactic therapy, and seronegative rheumatoid arthritis on methotrexate 17.5 mg weekly and hydroxychloroquine 400 mg daily. He still has considerable stiffness in both hands and some tenderness in the PIPs. Will request PA for Humira.     Plan:     Problem List Items Addressed This Visit          Immunology/Multi System    Rheumatoid arthritis - Primary    Relevant Medications    predniSONE (DELTASONE) 5 MG tablet    adalimumab (HUMIRA,CF, PEN) 40 mg/0.4 mL PnKt    Other Relevant Orders    CBC Auto Differential    Comprehensive Metabolic Panel    Sedimentation rate    C-Reactive Protein     Other Visit Diagnoses       Medication monitoring encounter        Positive QuantiFERON-TB Gold test        Drug-induced immunodeficiency        Primary osteoarthritis involving multiple joints        Encounter for long-term (current) drug use        High risk medication use              - Request PA for Humira biweekly. I discussed potential side effects including injection site reactions, malignancy, infection, blood count, liver and kidney function abnormalities.  Hold for infection. ACR patient information sheet on TNF inhibitors was provided.  - MTX 17.5mg weekly plus folic acid daily   - Prednisone 5mg daily for now  - Discontinue HCQ once Humira is received  - CBC, CMP, ESR, CRP every 12 weeks  - Plaquenil eye exam due for repeat 4/2023  - Pre-DMARD labs due for repeat 9/2023  - DEXA due for repeat 8/2024  - Immunizations: Zoster live (2015), flu (1/22), PCV13 (2020), PCV20 (9/2022)    Follow up in 3 months    30 minutes of total time spent on the encounter, which includes face to face time and non-face to face time preparing to see the patient (eg, review of tests), Obtaining and/or  reviewing separately obtained history, Documenting clinical information in the electronic or other health record, Independently interpreting results (not separately reported) and communicating results to the patient/family/caregiver, or Care coordination (not separately reported).       Nitza Ruiz M.D.  Rheumatology Dept  [place], [place]

## 2023-06-05 ENCOUNTER — OFFICE VISIT (OUTPATIENT)
Dept: RHEUMATOLOGY | Facility: CLINIC | Age: 73
End: 2023-06-05
Payer: MEDICARE

## 2023-06-05 VITALS
HEART RATE: 75 BPM | WEIGHT: 165 LBS | DIASTOLIC BLOOD PRESSURE: 72 MMHG | HEIGHT: 67 IN | BODY MASS INDEX: 25.9 KG/M2 | SYSTOLIC BLOOD PRESSURE: 135 MMHG

## 2023-06-05 DIAGNOSIS — Z79.899 HIGH RISK MEDICATION USE: ICD-10-CM

## 2023-06-05 DIAGNOSIS — M15.9 PRIMARY OSTEOARTHRITIS INVOLVING MULTIPLE JOINTS: ICD-10-CM

## 2023-06-05 DIAGNOSIS — Z51.81 MEDICATION MONITORING ENCOUNTER: ICD-10-CM

## 2023-06-05 DIAGNOSIS — R76.12 POSITIVE QUANTIFERON-TB GOLD TEST: ICD-10-CM

## 2023-06-05 DIAGNOSIS — D84.821 DRUG-INDUCED IMMUNODEFICIENCY: ICD-10-CM

## 2023-06-05 DIAGNOSIS — Z79.899 DRUG-INDUCED IMMUNODEFICIENCY: ICD-10-CM

## 2023-06-05 DIAGNOSIS — Z79.899 ENCOUNTER FOR LONG-TERM (CURRENT) DRUG USE: ICD-10-CM

## 2023-06-05 DIAGNOSIS — M05.79 RHEUMATOID ARTHRITIS INVOLVING MULTIPLE SITES WITH POSITIVE RHEUMATOID FACTOR: Primary | ICD-10-CM

## 2023-06-05 PROCEDURE — 3044F PR MOST RECENT HEMOGLOBIN A1C LEVEL <7.0%: ICD-10-PCS | Mod: CPTII,S$GLB,, | Performed by: STUDENT IN AN ORGANIZED HEALTH CARE EDUCATION/TRAINING PROGRAM

## 2023-06-05 PROCEDURE — 1159F PR MEDICATION LIST DOCUMENTED IN MEDICAL RECORD: ICD-10-PCS | Mod: CPTII,S$GLB,, | Performed by: STUDENT IN AN ORGANIZED HEALTH CARE EDUCATION/TRAINING PROGRAM

## 2023-06-05 PROCEDURE — 1160F PR REVIEW ALL MEDS BY PRESCRIBER/CLIN PHARMACIST DOCUMENTED: ICD-10-PCS | Mod: CPTII,S$GLB,, | Performed by: STUDENT IN AN ORGANIZED HEALTH CARE EDUCATION/TRAINING PROGRAM

## 2023-06-05 PROCEDURE — 1125F AMNT PAIN NOTED PAIN PRSNT: CPT | Mod: CPTII,S$GLB,, | Performed by: STUDENT IN AN ORGANIZED HEALTH CARE EDUCATION/TRAINING PROGRAM

## 2023-06-05 PROCEDURE — 3075F PR MOST RECENT SYSTOLIC BLOOD PRESS GE 130-139MM HG: ICD-10-PCS | Mod: CPTII,S$GLB,, | Performed by: STUDENT IN AN ORGANIZED HEALTH CARE EDUCATION/TRAINING PROGRAM

## 2023-06-05 PROCEDURE — 1160F RVW MEDS BY RX/DR IN RCRD: CPT | Mod: CPTII,S$GLB,, | Performed by: STUDENT IN AN ORGANIZED HEALTH CARE EDUCATION/TRAINING PROGRAM

## 2023-06-05 PROCEDURE — 1125F PR PAIN SEVERITY QUANTIFIED, PAIN PRESENT: ICD-10-PCS | Mod: CPTII,S$GLB,, | Performed by: STUDENT IN AN ORGANIZED HEALTH CARE EDUCATION/TRAINING PROGRAM

## 2023-06-05 PROCEDURE — 1159F MED LIST DOCD IN RCRD: CPT | Mod: CPTII,S$GLB,, | Performed by: STUDENT IN AN ORGANIZED HEALTH CARE EDUCATION/TRAINING PROGRAM

## 2023-06-05 PROCEDURE — 3078F PR MOST RECENT DIASTOLIC BLOOD PRESSURE < 80 MM HG: ICD-10-PCS | Mod: CPTII,S$GLB,, | Performed by: STUDENT IN AN ORGANIZED HEALTH CARE EDUCATION/TRAINING PROGRAM

## 2023-06-05 PROCEDURE — 3078F DIAST BP <80 MM HG: CPT | Mod: CPTII,S$GLB,, | Performed by: STUDENT IN AN ORGANIZED HEALTH CARE EDUCATION/TRAINING PROGRAM

## 2023-06-05 PROCEDURE — 99215 PR OFFICE/OUTPT VISIT, EST, LEVL V, 40-54 MIN: ICD-10-PCS | Mod: S$GLB,,, | Performed by: STUDENT IN AN ORGANIZED HEALTH CARE EDUCATION/TRAINING PROGRAM

## 2023-06-05 PROCEDURE — 99999 PR PBB SHADOW E&M-EST. PATIENT-LVL III: ICD-10-PCS | Mod: PBBFAC,,, | Performed by: STUDENT IN AN ORGANIZED HEALTH CARE EDUCATION/TRAINING PROGRAM

## 2023-06-05 PROCEDURE — 99999 PR PBB SHADOW E&M-EST. PATIENT-LVL III: CPT | Mod: PBBFAC,,, | Performed by: STUDENT IN AN ORGANIZED HEALTH CARE EDUCATION/TRAINING PROGRAM

## 2023-06-05 PROCEDURE — 3008F PR BODY MASS INDEX (BMI) DOCUMENTED: ICD-10-PCS | Mod: CPTII,S$GLB,, | Performed by: STUDENT IN AN ORGANIZED HEALTH CARE EDUCATION/TRAINING PROGRAM

## 2023-06-05 PROCEDURE — 99215 OFFICE O/P EST HI 40 MIN: CPT | Mod: S$GLB,,, | Performed by: STUDENT IN AN ORGANIZED HEALTH CARE EDUCATION/TRAINING PROGRAM

## 2023-06-05 PROCEDURE — 3075F SYST BP GE 130 - 139MM HG: CPT | Mod: CPTII,S$GLB,, | Performed by: STUDENT IN AN ORGANIZED HEALTH CARE EDUCATION/TRAINING PROGRAM

## 2023-06-05 PROCEDURE — 3008F BODY MASS INDEX DOCD: CPT | Mod: CPTII,S$GLB,, | Performed by: STUDENT IN AN ORGANIZED HEALTH CARE EDUCATION/TRAINING PROGRAM

## 2023-06-05 PROCEDURE — 3044F HG A1C LEVEL LT 7.0%: CPT | Mod: CPTII,S$GLB,, | Performed by: STUDENT IN AN ORGANIZED HEALTH CARE EDUCATION/TRAINING PROGRAM

## 2023-06-05 RX ORDER — ADALIMUMAB 40MG/0.4ML
40 KIT SUBCUTANEOUS
Qty: 6 PEN | Refills: 1 | Status: ACTIVE | OUTPATIENT
Start: 2023-06-05 | End: 2023-11-28 | Stop reason: SDUPTHER

## 2023-06-05 RX ORDER — ADALIMUMAB 40MG/0.4ML
40 KIT SUBCUTANEOUS
Qty: 6 PEN | Refills: 1 | Status: SHIPPED | OUTPATIENT
Start: 2023-06-05 | End: 2023-06-05

## 2023-06-05 RX ORDER — PREDNISONE 5 MG/1
5 TABLET ORAL DAILY
Qty: 90 TABLET | Refills: 1 | Status: SHIPPED | OUTPATIENT
Start: 2023-06-05 | End: 2023-12-01 | Stop reason: SDUPTHER

## 2023-06-05 ASSESSMENT — ROUTINE ASSESSMENT OF PATIENT INDEX DATA (RAPID3)
MDHAQ FUNCTION SCORE: 0.6
TOTAL RAPID3 SCORE: 1.83
FATIGUE SCORE: 1.1
PSYCHOLOGICAL DISTRESS SCORE: 0
PATIENT GLOBAL ASSESSMENT SCORE: 0
PAIN SCORE: 3.5

## 2023-06-09 ENCOUNTER — TELEPHONE (OUTPATIENT)
Dept: PHARMACY | Facility: CLINIC | Age: 73
End: 2023-06-09
Payer: MEDICARE

## 2023-06-09 NOTE — TELEPHONE ENCOUNTER
Hello, this is Tobi Mckee, clinical pharmacist with [facility] that is part of your care team.  We have begun working on your prescription that your doctor has sent us. Our next steps include:     Working with your insurance company to obtain approval for your medication  Working with you to ensure your medication is affordable     We will be calling you along the way with updates on your medication but if you have any concerns or receive information that you would like to discuss please reach us at [phone].    Welcome call outcome: Patient/caregiver reached

## 2023-06-15 ENCOUNTER — TELEPHONE (OUTPATIENT)
Dept: FAMILY MEDICINE | Facility: CLINIC | Age: 73
End: 2023-06-15

## 2023-06-15 NOTE — TELEPHONE ENCOUNTER
----- Message from Kareen Peguero sent at 6/15/2023  8:05 AM CDT -----  Pt dropped off paperwork to be scanned into his chart.  [phone]

## 2023-06-16 ENCOUNTER — SPECIALTY PHARMACY (OUTPATIENT)
Dept: PHARMACY | Facility: CLINIC | Age: 73
End: 2023-06-16
Payer: MEDICARE

## 2023-06-16 NOTE — TELEPHONE ENCOUNTER
Jama GUY approved   06/14/2023 to 06/14/2024     [facility] [employer]- $5 copay with e-voucher.   Referral complete.

## 2023-06-19 ENCOUNTER — TELEPHONE (OUTPATIENT)
Dept: RHEUMATOLOGY | Facility: CLINIC | Age: 73
End: 2023-06-19
Payer: MEDICARE

## 2023-06-19 ENCOUNTER — PATIENT MESSAGE (OUTPATIENT)
Dept: PHARMACY | Facility: CLINIC | Age: 73
End: 2023-06-19
Payer: MEDICARE

## 2023-06-19 NOTE — TELEPHONE ENCOUNTER
----- Message from Maria Antonia Cao sent at 6/14/2023  9:07 AM CDT -----  Regarding: TB labs and request needs review  Please review paperwork that was scanned on 6/14/23. In regards to the TB that the provider requested

## 2023-06-20 NOTE — TELEPHONE ENCOUNTER
TB records and clearance to start Humira have been noted.  I called Mr. Norton yesterday to set up his initial Humira fill- Just waiting on his call back.    Thanks!  Livier Combs, PharmD  [facility]  [phone]

## 2023-06-21 ENCOUNTER — SPECIALTY PHARMACY (OUTPATIENT)
Dept: PHARMACY | Facility: CLINIC | Age: 73
End: 2023-06-21
Payer: MEDICARE

## 2023-06-21 DIAGNOSIS — M06.9 RHEUMATOID ARTHRITIS, INVOLVING UNSPECIFIED SITE, UNSPECIFIED WHETHER RHEUMATOID FACTOR PRESENT: Primary | ICD-10-CM

## 2023-06-21 NOTE — TELEPHONE ENCOUNTER
Specialty Pharmacy - Initial Clinical Assessment    Specialty Medication Orders Linked to Encounter      Flowsheet Row Most Recent Value   Medication #1 adalimumab (HUMIRA,CF, PEN) 40 mg/0.4 mL PnKt (Order#[id number], Rx#[id number])          Patient Diagnosis   M06.9 - Rheumatoid arthritis    Subjective    Kyrie Norton Jr. is a 73 y.o. male, who is followed by the specialty pharmacy service for management and education.    Recent Encounters       Date Type Provider Description    06/21/2023 Specialty Pharmacy Livier Combs PharmD Initial Clinical Assessment    06/16/2023 Specialty Pharmacy Livier Combs PharmD Referral Authorization            Current Outpatient Medications   Medication Sig    adalimumab (HUMIRA,CF, PEN) 40 mg/0.4 mL PnKt Inject 0.4 mLs (40 mg total) into the skin every 14 (fourteen) days.    aspirin (ECOTRIN) 81 MG EC tablet Take 81 mg by mouth once daily.    atorvastatin (LIPITOR) 40 MG tablet Take 1 tablet (40 mg total) by mouth once daily.    blood sugar diagnostic (ACCU-CHEK CJ PLUS TEST STRP) Strp 1 strip by Subdermal route Daily.    coenzyme Q10 10 mg capsule Take 10 mg by mouth once daily.    fish oil-omega-3 fatty acids 300-1,000 mg capsule Take 2 g by mouth once daily.    folic acid (FOLVITE) 1 MG tablet Take 1 tablet (1,000 mcg total) by mouth once daily.    glipizide-metformin (METAGLIP) 5-500 mg per tablet 1 tablet in the am, 2 tablets in the pm.    lancets (ACCU-CHEK FASTCLIX) Misc 1 Device by Misc.(Non-Drug; Combo Route) route Daily.    latanoprost 0.005 % ophthalmic solution INSTILL 1 DROP INTO BOTH EYES EVERY EVENING    methotrexate 2.5 MG Tab Take 7 tablets (17.5 mg total) by mouth every 7 days.    MILK THISTLE ORAL Take 250 mg by mouth once daily.     predniSONE (DELTASONE) 5 MG tablet Take 1 tablet (5 mg total) by mouth once daily.    timolol maleate 0.5% (TIMOPTIC) 0.5 % Drop PLACE 1 DROP INTO THE LEFT EYE ONCE DAILY.    triamcinolone acetonide 0.1% (KENALOG) 0.1 % paste  SMARTSIG:Sparingly Topical 3 Times Daily   Last reviewed on 6/21/2023  2:21 PM by Livier Blancas, PharmD    Review of patient's allergies indicates:   Allergen Reactions    Parafon forte Nausea Only   Last reviewed on  6/21/2023 2:19 PM by Livier Blancas    Drug Interactions    Drug interactions evaluated: yes  Clinically relevant drug interactions identified: no  Provided the patient with educational material regarding drug interactions: not applicable         Adverse Effects    Arthralgias: Pos       Assessment Questions - Documented Responses      Flowsheet Row Most Recent Value   Assessment    Medication Reconciliation completed for patient No   During the past 4 weeks, has patient missed any activities due to condition or medication? No   During the past 4 weeks, did patient have any of the following urgent care visits? None   Goals of Therapy Status Discussed (new start)   Status of the patients ability to self-administer: Caregiver to administer   All education points have been covered with patient? Yes, supplemental printed education provided   Welcome packet contents reviewed and discussed with patient? Yes   Assesment completed? Yes   Plan Therapy being initiated   Do you need to open a clinical intervention (i-vent)? No   Do you want to schedule first shipment? Yes   Medication #1 Assessment Info    Patient status New medication, New to [facility]   Is this medication appropriate for the patient? Yes   Is this medication effective? Not yet started          Refill Questions - Documented Responses      Flowsheet Row Most Recent Value   Patient Availability and HIPAA Verification    Does patient want to proceed with activity? Yes   HIPAA/medical authority confirmed? Yes   Relationship to patient of person spoken to? Self   Refill Screening Questions    When does the patient need to receive the medication? 06/23/23   Refill Delivery Questions    How will the patient receive the medication? MEDRx   When does the patient  "need to receive the medication? 06/23/23   Shipping Address Home   Address in [facility] confirmed and updated if neccessary? Yes   Expected Copay ($) 5   Is the patient able to afford the medication copay? Yes   Payment Method CC on file   Days supply of Refill 28   Supplies needed? No supplies needed   Refill activity completed? Yes   Refill activity plan Refill scheduled   Shipment/Pickup Date: 06/22/23            Objective    He has a past medical history of Diabetes mellitus type II, Hyperlipidemia, and Hypertension.    Tried/failed medications: MTX    BP Readings from Last 4 Encounters:   06/05/23 135/72   03/10/23 124/75   01/30/23 104/66   10/24/22 (!) 149/69     Ht Readings from Last 4 Encounters:   06/05/23 5' 7" (1.702 m)   03/10/23 5' 7" (1.702 m)   01/30/23 5' 7" (1.702 m)   10/24/22 5' 7" (1.702 m)     Wt Readings from Last 4 Encounters:   06/05/23 74.8 kg (165 lb)   03/10/23 70.8 kg (156 lb)   01/30/23 71.7 kg (158 lb)   10/24/22 72.8 kg (160 lb 7.9 oz)       The goals of rheumatoid arthritis treatment include:  Relieving pain and suppressing inflammation  Achieving remission and preventing joint and organ damage  Increasing joint mobility and strength  Preventing infection and other complications of treatment  Reducing long term complications of rheumatoid arthritis  Improving or maintaining physical function and optimal well-being  Improving or maintaining quality of life  Maintaining optimal therapy adherence  Minimizing and managing side effects    Goals of Therapy Status: Discussed (new start)    Assessment/Plan  Patient plans to start therapy on 06/23/23      Indication, dosage, appropriateness, effectiveness, safety and convenience of his specialty medication(s) were reviewed today.     Patient Education   Patient received education on the following:   Expectations and possible outcomes of therapy  Proper use, timely administration, and missed dose management  Duration of therapy  Side " effects, including prevention, minimization, and management  Contraindications and safety precautions  New or changed medications, including prescribe and over the counter medications and supplements  Reviews recommended vaccinations, as appropriate  Storage, safe handling, and disposal    Tasks added this encounter   3/21/2024 - Clinical Assessment (1 year recurrence)   Tasks due within next 3 months   No tasks due.     Livier Combs, PharmD  [facility]  [address]  [facility] [address]  Phone: [phone]  Fax: [phone]

## 2023-06-23 ENCOUNTER — TELEPHONE (OUTPATIENT)
Dept: FAMILY MEDICINE | Facility: CLINIC | Age: 73
End: 2023-06-23

## 2023-06-23 DIAGNOSIS — E11.9 TYPE 2 DIABETES MELLITUS WITHOUT COMPLICATION, WITHOUT LONG-TERM CURRENT USE OF INSULIN: ICD-10-CM

## 2023-06-23 NOTE — TELEPHONE ENCOUNTER
Spoke to pt and he Is needing a refill on his Blood sugar strips.     Last office visit 1/30  Next office visit 8/01

## 2023-06-23 NOTE — TELEPHONE ENCOUNTER
----- Message from Leslie Ontiveros MA sent at 6/23/2023  9:00 AM CDT -----  Regarding: fyi  Patient called to verify his appointment and when to get labs done. Patient notified appointment is Tues 8/1/23 for 7am. Labs have already been order via [employer] and to have them done a week for his 8/1/23 appointment.       Callback: [phone]

## 2023-06-26 ENCOUNTER — SPECIALTY PHARMACY (OUTPATIENT)
Dept: PHARMACY | Facility: CLINIC | Age: 73
End: 2023-06-26
Payer: MEDICARE

## 2023-06-26 NOTE — TELEPHONE ENCOUNTER
Pt called [facility] for early refill stating he is going out of town 7/7 and returns 7/28.  Claim RTS 7/12/23.   Called [facility]- vacation override per Soham granted. Advised pt his plan only allows 1 vacation override per year.    Specialty Pharmacy - Refill Coordination    Specialty Medication Orders Linked to Encounter      Flowsheet Row Most Recent Value   Medication #1 adalimumab (HUMIRA,CF, PEN) 40 mg/0.4 mL PnKt (Order#[id number], Rx#[id number])            Refill Questions - Documented Responses      Flowsheet Row Most Recent Value   Refill Screening Questions    Changes to allergies? No   Changes to medications? No   New conditions since last clinic visit? No   Unplanned office visit, urgent care, ED, or hospital admission in the last 4 weeks? No   How does patient/caregiver feel medication is working? Too soon to tell   Financial problems or insurance changes? No   How many doses of your specialty medications were missed in the last 4 weeks? 0   Would patient like to speak to a pharmacist? No   When does the patient need to receive the medication? 06/30/23   Refill Delivery Questions    How will the patient receive the medication? MEDRx   When does the patient need to receive the medication? 06/30/23   Shipping Address Home   Address in [facility] confirmed and updated if neccessary? Yes   Expected Copay ($) 5   Is the patient able to afford the medication copay? Yes   Payment Method CC on file   Days supply of Refill 28   Supplies needed? No supplies needed   Refill activity completed? Yes   Refill activity plan Refill scheduled   Shipment/Pickup Date: 06/29/23            Current Outpatient Medications   Medication Sig    adalimumab (HUMIRA,CF, PEN) 40 mg/0.4 mL PnKt Inject 0.4 mLs (40 mg total) into the skin every 14 (fourteen) days.    aspirin (ECOTRIN) 81 MG EC tablet Take 81 mg by mouth once daily.    atorvastatin (LIPITOR) 40 MG tablet Take 1 tablet (40 mg total) by mouth once daily.    blood  sugar diagnostic (ACCU-CHEK CJ PLUS TEST STRP) Strp 1 strip by Subdermal route Daily.    coenzyme Q10 10 mg capsule Take 10 mg by mouth once daily.    fish oil-omega-3 fatty acids 300-1,000 mg capsule Take 2 g by mouth once daily.    folic acid (FOLVITE) 1 MG tablet Take 1 tablet (1,000 mcg total) by mouth once daily.    glipizide-metformin (METAGLIP) 5-500 mg per tablet 1 tablet in the am, 2 tablets in the pm.    lancets (ACCU-CHEK FASTCLIX) Misc 1 Device by Misc.(Non-Drug; Combo Route) route Daily.    latanoprost 0.005 % ophthalmic solution INSTILL 1 DROP INTO BOTH EYES EVERY EVENING    methotrexate 2.5 MG Tab Take 7 tablets (17.5 mg total) by mouth every 7 days.    MILK THISTLE ORAL Take 250 mg by mouth once daily.     predniSONE (DELTASONE) 5 MG tablet Take 1 tablet (5 mg total) by mouth once daily.    timolol maleate 0.5% (TIMOPTIC) 0.5 % Drop PLACE 1 DROP INTO THE LEFT EYE ONCE DAILY.    triamcinolone acetonide 0.1% (KENALOG) 0.1 % paste SMARTSIG:Sparingly Topical 3 Times Daily   Last reviewed on 6/21/2023  2:21 PM by Livier Combs PharmD    Review of patient's allergies indicates:   Allergen Reactions    Parafon forte Nausea Only    Last reviewed on  6/21/2023 2:19 PM by Livier Combs      Tasks added this encounter   No tasks added.   Tasks due within next 3 months   No tasks due.     Livier Combs, PharmD  [facility]  [address] [place] [address]  Phone: [phone]  Fax: [phone]

## 2023-07-18 LAB
ALBUMIN SERPL-MCNC: 4.1 G/DL (ref 3.6–5.1)
ALBUMIN/GLOB SERPL: 1.9 (CALC) (ref 1–2.5)
ALP SERPL-CCNC: 44 U/L (ref 35–144)
ALT SERPL-CCNC: 23 U/L (ref 9–46)
AST SERPL-CCNC: 18 U/L (ref 10–35)
BILIRUB SERPL-MCNC: 0.6 MG/DL (ref 0.2–1.2)
BUN SERPL-MCNC: 16 MG/DL (ref 7–25)
BUN/CREAT SERPL: NORMAL (CALC) (ref 6–22)
CALCIUM SERPL-MCNC: 9.1 MG/DL (ref 8.6–10.3)
CHLORIDE SERPL-SCNC: 104 MMOL/L (ref 98–110)
CHOLEST SERPL-MCNC: 112 MG/DL
CHOLEST/HDLC SERPL: 2.2 (CALC)
CO2 SERPL-SCNC: 30 MMOL/L (ref 20–32)
CREAT SERPL-MCNC: 0.85 MG/DL (ref 0.7–1.28)
EGFR: 92 ML/MIN/1.73M2
GLOBULIN SER CALC-MCNC: 2.2 G/DL (CALC) (ref 1.9–3.7)
GLUCOSE SERPL-MCNC: 85 MG/DL (ref 65–99)
HBA1C MFR BLD: 6 % OF TOTAL HGB
HDLC SERPL-MCNC: 50 MG/DL
LDLC SERPL CALC-MCNC: 46 MG/DL (CALC)
NONHDLC SERPL-MCNC: 62 MG/DL (CALC)
POTASSIUM SERPL-SCNC: 4.6 MMOL/L (ref 3.5–5.3)
PROT SERPL-MCNC: 6.3 G/DL (ref 6.1–8.1)
SODIUM SERPL-SCNC: 140 MMOL/L (ref 135–146)
TRIGL SERPL-MCNC: 84 MG/DL

## 2023-07-20 ENCOUNTER — SPECIALTY PHARMACY (OUTPATIENT)
Dept: PHARMACY | Facility: CLINIC | Age: 73
End: 2023-07-20
Payer: MEDICARE

## 2023-07-20 NOTE — TELEPHONE ENCOUNTER
Outgoing call - Humira refill   Pt next injection is due on 8/3. Informed pt that refill is too soon and will follow up on 7/24.

## 2023-07-24 NOTE — TELEPHONE ENCOUNTER
Specialty Pharmacy - Refill Coordination    Specialty Medication Orders Linked to Encounter      Flowsheet Row Most Recent Value   Medication #1 adalimumab (HUMIRA,CF, PEN) 40 mg/0.4 mL PnKt (Order#[id number], Rx#[id number])            Refill Questions - Documented Responses      Flowsheet Row Most Recent Value   Patient Availability and HIPAA Verification    Does patient want to proceed with activity? Yes   HIPAA/medical authority confirmed? Yes   Relationship to patient of person spoken to? Self   Refill Screening Questions    Changes to allergies? No   Changes to medications? No   New conditions since last clinic visit? No   Unplanned office visit, urgent care, ED, or hospital admission in the last 4 weeks? No   How does patient/caregiver feel medication is working? Good   Financial problems or insurance changes? No   How many doses of your specialty medications were missed in the last 4 weeks? 0   Would patient like to speak to a pharmacist? No   When does the patient need to receive the medication? 08/03/23   Refill Delivery Questions    How will the patient receive the medication? MEDRx   When does the patient need to receive the medication? 08/03/23   Shipping Address Home   Address in [facility] confirmed and updated if neccessary? Yes   Expected Copay ($) 5   Is the patient able to afford the medication copay? Yes   Payment Method CC on file   Days supply of Refill 28   Supplies needed? No supplies needed   Refill activity completed? Yes   Refill activity plan Refill scheduled   Shipment/Pickup Date: 08/02/23            Current Outpatient Medications   Medication Sig    adalimumab (HUMIRA,CF, PEN) 40 mg/0.4 mL PnKt Inject 0.4 mLs (40 mg total) into the skin every 14 (fourteen) days.    aspirin (ECOTRIN) 81 MG EC tablet Take 81 mg by mouth once daily.    atorvastatin (LIPITOR) 40 MG tablet Take 1 tablet (40 mg total) by mouth once daily.    blood sugar diagnostic (ACCU-CHEK CJ PLUS TEST STRP) Strp  1 strip by Subdermal route Daily.    coenzyme Q10 10 mg capsule Take 10 mg by mouth once daily.    fish oil-omega-3 fatty acids 300-1,000 mg capsule Take 2 g by mouth once daily.    folic acid (FOLVITE) 1 MG tablet Take 1 tablet (1,000 mcg total) by mouth once daily.    glipizide-metformin (METAGLIP) 5-500 mg per tablet 1 tablet in the am, 2 tablets in the pm.    lancets (ACCU-CHEK FASTCLIX) Misc 1 Device by Misc.(Non-Drug; Combo Route) route Daily.    latanoprost 0.005 % ophthalmic solution INSTILL 1 DROP INTO BOTH EYES EVERY EVENING    methotrexate 2.5 MG Tab Take 7 tablets (17.5 mg total) by mouth every 7 days.    MILK THISTLE ORAL Take 250 mg by mouth once daily.     predniSONE (DELTASONE) 5 MG tablet Take 1 tablet (5 mg total) by mouth once daily.    timolol maleate 0.5% (TIMOPTIC) 0.5 % Drop PLACE 1 DROP INTO THE LEFT EYE ONCE DAILY.    triamcinolone acetonide 0.1% (KENALOG) 0.1 % paste SMARTSIG:Sparingly Topical 3 Times Daily   Last reviewed on 6/21/2023  2:21 PM by Livier Blancas, PharmD    Review of patient's allergies indicates:   Allergen Reactions    Parafon forte Nausea Only    Last reviewed on  6/21/2023 2:19 PM by Livier Blancas      Tasks added this encounter   No tasks added.   Tasks due within next 3 months   No tasks due.     [facility]  [address] [place] [id number]  Phone: [phone]  Fax: [phone]

## 2023-08-01 ENCOUNTER — OFFICE VISIT (OUTPATIENT)
Dept: FAMILY MEDICINE | Facility: CLINIC | Age: 73
End: 2023-08-01
Payer: MEDICARE

## 2023-08-01 VITALS
DIASTOLIC BLOOD PRESSURE: 62 MMHG | BODY MASS INDEX: 26.53 KG/M2 | HEART RATE: 68 BPM | HEIGHT: 67 IN | WEIGHT: 169 LBS | SYSTOLIC BLOOD PRESSURE: 118 MMHG

## 2023-08-01 DIAGNOSIS — E11.9 TYPE 2 DIABETES MELLITUS WITHOUT RETINOPATHY: Primary | ICD-10-CM

## 2023-08-01 DIAGNOSIS — E78.5 HYPERLIPIDEMIA ASSOCIATED WITH TYPE 2 DIABETES MELLITUS: ICD-10-CM

## 2023-08-01 DIAGNOSIS — E11.69 HYPERLIPIDEMIA ASSOCIATED WITH TYPE 2 DIABETES MELLITUS: ICD-10-CM

## 2023-08-01 DIAGNOSIS — B35.4 TINEA CORPORIS: ICD-10-CM

## 2023-08-01 PROCEDURE — 1159F MED LIST DOCD IN RCRD: CPT | Mod: CPTII,S$GLB,, | Performed by: PHYSICIAN ASSISTANT

## 2023-08-01 PROCEDURE — 1101F PT FALLS ASSESS-DOCD LE1/YR: CPT | Mod: CPTII,S$GLB,, | Performed by: PHYSICIAN ASSISTANT

## 2023-08-01 PROCEDURE — 3044F PR MOST RECENT HEMOGLOBIN A1C LEVEL <7.0%: ICD-10-PCS | Mod: CPTII,S$GLB,, | Performed by: PHYSICIAN ASSISTANT

## 2023-08-01 PROCEDURE — 3008F PR BODY MASS INDEX (BMI) DOCUMENTED: ICD-10-PCS | Mod: CPTII,S$GLB,, | Performed by: PHYSICIAN ASSISTANT

## 2023-08-01 PROCEDURE — 3044F HG A1C LEVEL LT 7.0%: CPT | Mod: CPTII,S$GLB,, | Performed by: PHYSICIAN ASSISTANT

## 2023-08-01 PROCEDURE — 3074F SYST BP LT 130 MM HG: CPT | Mod: CPTII,S$GLB,, | Performed by: PHYSICIAN ASSISTANT

## 2023-08-01 PROCEDURE — 3288F FALL RISK ASSESSMENT DOCD: CPT | Mod: CPTII,S$GLB,, | Performed by: PHYSICIAN ASSISTANT

## 2023-08-01 PROCEDURE — 1159F PR MEDICATION LIST DOCUMENTED IN MEDICAL RECORD: ICD-10-PCS | Mod: CPTII,S$GLB,, | Performed by: PHYSICIAN ASSISTANT

## 2023-08-01 PROCEDURE — 3078F DIAST BP <80 MM HG: CPT | Mod: CPTII,S$GLB,, | Performed by: PHYSICIAN ASSISTANT

## 2023-08-01 PROCEDURE — 3008F BODY MASS INDEX DOCD: CPT | Mod: CPTII,S$GLB,, | Performed by: PHYSICIAN ASSISTANT

## 2023-08-01 PROCEDURE — 1101F PR PT FALLS ASSESS DOC 0-1 FALLS W/OUT INJ PAST YR: ICD-10-PCS | Mod: CPTII,S$GLB,, | Performed by: PHYSICIAN ASSISTANT

## 2023-08-01 PROCEDURE — 99214 PR OFFICE/OUTPT VISIT, EST, LEVL IV, 30-39 MIN: ICD-10-PCS | Mod: S$GLB,,, | Performed by: PHYSICIAN ASSISTANT

## 2023-08-01 PROCEDURE — 3074F PR MOST RECENT SYSTOLIC BLOOD PRESSURE < 130 MM HG: ICD-10-PCS | Mod: CPTII,S$GLB,, | Performed by: PHYSICIAN ASSISTANT

## 2023-08-01 PROCEDURE — 3078F PR MOST RECENT DIASTOLIC BLOOD PRESSURE < 80 MM HG: ICD-10-PCS | Mod: CPTII,S$GLB,, | Performed by: PHYSICIAN ASSISTANT

## 2023-08-01 PROCEDURE — 99214 OFFICE O/P EST MOD 30 MIN: CPT | Mod: S$GLB,,, | Performed by: PHYSICIAN ASSISTANT

## 2023-08-01 PROCEDURE — 3288F PR FALLS RISK ASSESSMENT DOCUMENTED: ICD-10-PCS | Mod: CPTII,S$GLB,, | Performed by: PHYSICIAN ASSISTANT

## 2023-08-01 RX ORDER — GLIPIZIDE AND METFORMIN HCL 5; 500 MG/1; MG/1
TABLET, FILM COATED ORAL
Qty: 270 TABLET | Refills: 1 | Status: SHIPPED | OUTPATIENT
Start: 2023-08-01 | End: 2024-01-30 | Stop reason: SDUPTHER

## 2023-08-01 RX ORDER — ATORVASTATIN CALCIUM 40 MG/1
40 TABLET, FILM COATED ORAL DAILY
Qty: 90 TABLET | Refills: 4 | Status: SHIPPED | OUTPATIENT
Start: 2023-08-01 | End: 2024-01-30 | Stop reason: SDUPTHER

## 2023-08-01 NOTE — PROGRESS NOTES
SUBJECTIVE:    Patient ID: Kyrie Norton Jr. is a 73 y.o. male.    Chief Complaint: Diabetes (Went over meds verbally, Eye exam pt will sched, Foot exam set up// SW)    This is a 73-year-old male who presents today for regular diabetic checkup.  A1c recently checked on blood work in 6%.  Well-maintained.  Other labs look stable.  He has his eye exam scheduled.  DFE to be completed today. He reports doing well. Son's wedding this weekend. Reports no new concerns at this time. Feels well. Only complaint is fungal rash to the back. A couple lesions. Crusty and irritated.         Lab Visit on 05/15/2023   Component Date Value Ref Range Status    WBC 05/15/2023 5.89  3.90 - 12.70 K/uL Final    RBC 05/15/2023 4.30 (L)  4.60 - 6.20 M/uL Final    Hemoglobin 05/15/2023 12.6 (L)  14.0 - 18.0 g/dL Final    Hematocrit 05/15/2023 37.4 (L)  40.0 - 54.0 % Final    MCV 05/15/2023 87  82 - 98 fL Final    MCH 05/15/2023 29.3  27.0 - 31.0 pg Final    MCHC 05/15/2023 33.7  32.0 - 36.0 g/dL Final    RDW 05/15/2023 13.8  11.5 - 14.5 % Final    Platelets 05/15/2023 244  150 - 450 K/uL Final    MPV 05/15/2023 10.2  9.2 - 12.9 fL Final    Immature Granulocytes 05/15/2023 0.2  0.0 - 0.5 % Final    Gran # (ANC) 05/15/2023 3.3  1.8 - 7.7 K/uL Final    Immature Grans (Abs) 05/15/2023 0.01  0.00 - 0.04 K/uL Final    Lymph # 05/15/2023 1.6  1.0 - 4.8 K/uL Final    Mono # 05/15/2023 0.5  0.3 - 1.0 K/uL Final    Eos # 05/15/2023 0.4  0.0 - 0.5 K/uL Final    Baso # 05/15/2023 0.02  0.00 - 0.20 K/uL Final    nRBC 05/15/2023 0  0 /100 WBC Final    Gran % 05/15/2023 56.6  38.0 - 73.0 % Final    Lymph % 05/15/2023 27.3  18.0 - 48.0 % Final    Mono % 05/15/2023 9.0  4.0 - 15.0 % Final    Eosinophil % 05/15/2023 6.6  0.0 - 8.0 % Final    Basophil % 05/15/2023 0.3  0.0 - 1.9 % Final    Differential Method 05/15/2023 Automated   Final    Sodium 05/15/2023 138  136 - 145 mmol/L Final    Potassium 05/15/2023 4.2  3.5 - 5.1 mmol/L Final    Chloride  05/15/2023 104  95 - 110 mmol/L Final    CO2 05/15/2023 28  23 - 29 mmol/L Final    Glucose 05/15/2023 220 (H)  70 - 110 mg/dL Final    BUN 05/15/2023 12  8 - 23 mg/dL Final    Creatinine 05/15/2023 0.9  0.5 - 1.4 mg/dL Final    Calcium 05/15/2023 9.4  8.7 - 10.5 mg/dL Final    Total Protein 05/15/2023 6.6  6.0 - 8.4 g/dL Final    Albumin 05/15/2023 3.7  3.5 - 5.2 g/dL Final    Total Bilirubin 05/15/2023 0.4  0.1 - 1.0 mg/dL Final    Alkaline Phosphatase 05/15/2023 56  55 - 135 U/L Final    AST 05/15/2023 20  10 - 40 U/L Final    ALT 05/15/2023 19  10 - 44 U/L Final    Anion Gap 05/15/2023 6 (L)  8 - 16 mmol/L Final    eGFR 05/15/2023 >60.0  >60 mL/min/1.73 m^2 Final    Sed Rate 05/15/2023 5  0 - 10 mm/Hr Final    CRP 05/15/2023 1.3  0.0 - 8.2 mg/L Final   Telephone on 04/13/2023   Component Date Value Ref Range Status    Glucose 07/17/2023 85  65 - 99 mg/dL Final    BUN 07/17/2023 16  7 - 25 mg/dL Final    Creatinine 07/17/2023 0.85  0.70 - 1.28 mg/dL Final    eGFR 07/17/2023 92  > OR = 60 mL/min/1.73m2 Final    BUN/Creatinine Ratio 07/17/2023 NOT APPLICABLE  6 - 22 (calc) Final    Sodium 07/17/2023 140  135 - 146 mmol/L Final    Potassium 07/17/2023 4.6  3.5 - 5.3 mmol/L Final    Chloride 07/17/2023 104  98 - 110 mmol/L Final    CO2 07/17/2023 30  20 - 32 mmol/L Final    Calcium 07/17/2023 9.1  8.6 - 10.3 mg/dL Final    Total Protein 07/17/2023 6.3  6.1 - 8.1 g/dL Final    Albumin 07/17/2023 4.1  3.6 - 5.1 g/dL Final    Globulin, Total 07/17/2023 2.2  1.9 - 3.7 g/dL (calc) Final    Albumin/Globulin Ratio 07/17/2023 1.9  1.0 - 2.5 (calc) Final    Total Bilirubin 07/17/2023 0.6  0.2 - 1.2 mg/dL Final    Alkaline Phosphatase 07/17/2023 44  35 - 144 U/L Final    AST 07/17/2023 18  10 - 35 U/L Final    ALT 07/17/2023 23  9 - 46 U/L Final    Cholesterol 07/17/2023 112  <200 mg/dL Final    HDL 07/17/2023 50  > OR = 40 mg/dL Final    Triglycerides 07/17/2023 84  <150 mg/dL Final    LDL Cholesterol 07/17/2023 46  mg/dL  (calc) Final    HDL/Cholesterol Ratio 07/17/2023 2.2  <5.0 (calc) Final    Non HDL Chol. (LDL+VLDL) 07/17/2023 62  <130 mg/dL (calc) Final    Hemoglobin A1C 07/17/2023 6.0 (H)  <5.7 % of total Hgb Final       Past Medical History:   Diagnosis Date    Diabetes mellitus type II     Hyperlipidemia     Hypertension      History reviewed. No pertinent surgical history.  Family History   Problem Relation Age of Onset    Blindness Neg Hx     Glaucoma Neg Hx     Macular degeneration Neg Hx        Marital Status:   Alcohol History:  reports current alcohol use.  Tobacco History:  reports that he has never smoked. He has never used smokeless tobacco.  Drug History:  reports no history of drug use.    Review of patient's allergies indicates:   Allergen Reactions    Parafon forte Nausea Only       Current Outpatient Medications:     adalimumab (HUMIRA,CF, PEN) 40 mg/0.4 mL PnKt, Inject 0.4 mLs (40 mg total) into the skin every 14 (fourteen) days., Disp: 6 pen, Rfl: 1    aspirin (ECOTRIN) 81 MG EC tablet, Take 81 mg by mouth once daily., Disp: , Rfl:     blood sugar diagnostic (ACCU-CHEK CJ PLUS TEST STRP) Strp, 1 strip by Subdermal route Daily., Disp: 100 strip, Rfl: 4    coenzyme Q10 10 mg capsule, Take 10 mg by mouth once daily., Disp: , Rfl:     fish oil-omega-3 fatty acids 300-1,000 mg capsule, Take 2 g by mouth once daily., Disp: , Rfl:     folic acid (FOLVITE) 1 MG tablet, Take 1 tablet (1,000 mcg total) by mouth once daily., Disp: 90 tablet, Rfl: 3    lancets (ACCU-CHEK FASTCLIX) Misc, 1 Device by Misc.(Non-Drug; Combo Route) route Daily., Disp: 100 each, Rfl: 4    latanoprost 0.005 % ophthalmic solution, INSTILL 1 DROP INTO BOTH EYES EVERY EVENING, Disp: 7.5 mL, Rfl: 3    methotrexate 2.5 MG Tab, Take 7 tablets (17.5 mg total) by mouth every 7 days., Disp: 84 tablet, Rfl: 1    MILK THISTLE ORAL, Take 250 mg by mouth once daily. , Disp: , Rfl:     predniSONE (DELTASONE) 5 MG tablet, Take 1 tablet (5 mg total)  "by mouth once daily., Disp: 90 tablet, Rfl: 1    timolol maleate 0.5% (TIMOPTIC) 0.5 % Drop, PLACE 1 DROP INTO THE LEFT EYE ONCE DAILY., Disp: 10 mL, Rfl: 1    triamcinolone acetonide 0.1% (KENALOG) 0.1 % paste, SMARTSIG:Sparingly Topical 3 Times Daily, Disp: , Rfl:     atorvastatin (LIPITOR) 40 MG tablet, Take 1 tablet (40 mg total) by mouth once daily., Disp: 90 tablet, Rfl: 4    glipizide-metformin (METAGLIP) 5-500 mg per tablet, 1 tablet in the am, 2 tablets in the pm., Disp: 270 tablet, Rfl: 1    Review of Systems   Constitutional:  Negative for activity change, fatigue, fever and unexpected weight change.   HENT:  Negative for congestion.    Respiratory:  Negative for apnea, cough, chest tightness and shortness of breath.    Cardiovascular:  Negative for chest pain and palpitations.   Gastrointestinal:  Negative for abdominal distention and abdominal pain.   Endocrine: Negative for polyuria.   Genitourinary:  Negative for difficulty urinating and dysuria.   Musculoskeletal:  Negative for arthralgias and back pain.   Neurological:  Negative for dizziness, weakness, light-headedness and headaches.          Objective:      Vitals:    08/01/23 0709   BP: 118/62   Pulse: 68   Weight: 76.7 kg (169 lb)   Height: 5' 7" (1.702 m)     Physical Exam  Constitutional:       General: He is not in acute distress.     Appearance: He is well-developed.   HENT:      Head: Normocephalic and atraumatic.   Eyes:      Pupils: Pupils are equal, round, and reactive to light.   Neck:      Thyroid: No thyromegaly.   Cardiovascular:      Rate and Rhythm: Normal rate and regular rhythm.      Pulses: Normal pulses.           Dorsalis pedis pulses are 2+ on the right side and 2+ on the left side.        Posterior tibial pulses are 2+ on the right side and 2+ on the left side.      Heart sounds: Normal heart sounds.   Pulmonary:      Effort: Pulmonary effort is normal.      Breath sounds: Normal breath sounds.   Abdominal:      General: " Bowel sounds are normal. There is no distension.      Palpations: Abdomen is soft.      Tenderness: There is no abdominal tenderness.   Musculoskeletal:         General: Normal range of motion.      Cervical back: Normal range of motion and neck supple.   Feet:      Right foot:      Protective Sensation: 2 sites tested.  2 sites sensed.      Skin integrity: Dry skin present. No ulcer or blister.      Left foot:      Protective Sensation: 2 sites tested.  2 sites sensed.      Skin integrity: Dry skin present. No ulcer or blister.   Skin:     General: Skin is warm and dry.      Capillary Refill: Capillary refill takes less than 2 seconds.      Findings: Rash present. No erythema.      Comments: Annular lesions. Thickened borders, some with central clearing and crusting present   Neurological:      Mental Status: He is alert and oriented to person, place, and time.      Cranial Nerves: No cranial nerve deficit.           Assessment:       1. Type 2 diabetes mellitus without retinopathy    2. Hyperlipidemia associated with type 2 diabetes mellitus    3. Tinea corporis         Plan:       Type 2 diabetes mellitus without retinopathy  Comments:  A1c is well managed at 6%. continue as is.  Orders:  -     Foot Exam Performed  -     glipizide-metformin (METAGLIP) 5-500 mg per tablet; 1 tablet in the am, 2 tablets in the pm.  Dispense: 270 tablet; Refill: 1    Hyperlipidemia associated with type 2 diabetes mellitus  Comments:  Labs reviewed and remain stable, refills today.  Orders:  -     atorvastatin (LIPITOR) 40 MG tablet; Take 1 tablet (40 mg total) by mouth once daily.  Dispense: 90 tablet; Refill: 4    Tinea corporis  Comments:  will cover with lotrisone now for the lesions. Will let me know if not seeing improvement.      Follow up in about 6 months (around 2/1/2024) for Diabetic Check-Up.        8/1/2023 Marco Antonio Loyola PA-C

## 2023-08-02 RX ORDER — CLOTRIMAZOLE AND BETAMETHASONE DIPROPIONATE 10; .64 MG/G; MG/G
CREAM TOPICAL 2 TIMES DAILY
Qty: 15 G | Refills: 1 | OUTPATIENT
Start: 2023-08-02 | End: 2023-08-02

## 2023-08-02 RX ORDER — CLOTRIMAZOLE AND BETAMETHASONE DIPROPIONATE 10; .64 MG/G; MG/G
CREAM TOPICAL 2 TIMES DAILY
Qty: 15 G | Refills: 1 | Status: SHIPPED | OUTPATIENT
Start: 2023-08-02 | End: 2023-09-01 | Stop reason: SDUPTHER

## 2023-08-02 NOTE — TELEPHONE ENCOUNTER
----- Message from Kareen Peguero sent at 8/2/2023  8:06 AM CDT -----  Pt was seen on yesterday and said that the prescription for a cream hasn't been sent to the pharmacy.  Pt would like to  hard copy of prescription. He uses [facility] on [address].  [phone]

## 2023-08-02 NOTE — TELEPHONE ENCOUNTER
Per Note, Sergio was going to send Lortisone cream for pt.     Spoke with Sergio he wanted pt to use BID.     Spoke with pt asked if it would be ok for Sergio to send in later today. Pt requesting rx printed. Can you print for Sergio. Please review.

## 2023-09-01 ENCOUNTER — LAB VISIT (OUTPATIENT)
Dept: LAB | Facility: HOSPITAL | Age: 73
End: 2023-09-01
Attending: STUDENT IN AN ORGANIZED HEALTH CARE EDUCATION/TRAINING PROGRAM
Payer: MEDICARE

## 2023-09-01 DIAGNOSIS — M05.79 RHEUMATOID ARTHRITIS INVOLVING MULTIPLE SITES WITH POSITIVE RHEUMATOID FACTOR: ICD-10-CM

## 2023-09-01 LAB
ALBUMIN SERPL BCP-MCNC: 4 G/DL (ref 3.5–5.2)
ALP SERPL-CCNC: 42 U/L (ref 55–135)
ALT SERPL W/O P-5'-P-CCNC: 19 U/L (ref 10–44)
ANION GAP SERPL CALC-SCNC: 6 MMOL/L (ref 8–16)
AST SERPL-CCNC: 19 U/L (ref 10–40)
BASOPHILS # BLD AUTO: 0.02 K/UL (ref 0–0.2)
BASOPHILS NFR BLD: 0.3 % (ref 0–1.9)
BILIRUB SERPL-MCNC: 0.8 MG/DL (ref 0.1–1)
BUN SERPL-MCNC: 9 MG/DL (ref 8–23)
CALCIUM SERPL-MCNC: 9.2 MG/DL (ref 8.7–10.5)
CHLORIDE SERPL-SCNC: 107 MMOL/L (ref 95–110)
CO2 SERPL-SCNC: 27 MMOL/L (ref 23–29)
CREAT SERPL-MCNC: 0.9 MG/DL (ref 0.5–1.4)
CRP SERPL-MCNC: <0.3 MG/L (ref 0–8.2)
DIFFERENTIAL METHOD: ABNORMAL
EOSINOPHIL # BLD AUTO: 0.2 K/UL (ref 0–0.5)
EOSINOPHIL NFR BLD: 2.5 % (ref 0–8)
ERYTHROCYTE [DISTWIDTH] IN BLOOD BY AUTOMATED COUNT: 14.6 % (ref 11.5–14.5)
ERYTHROCYTE [SEDIMENTATION RATE] IN BLOOD BY WESTERGREN METHOD: 0 MM/HR (ref 0–10)
EST. GFR  (NO RACE VARIABLE): >60 ML/MIN/1.73 M^2
GLUCOSE SERPL-MCNC: 71 MG/DL (ref 70–110)
HCT VFR BLD AUTO: 39.5 % (ref 40–54)
HGB BLD-MCNC: 12.4 G/DL (ref 14–18)
IMM GRANULOCYTES # BLD AUTO: 0.01 K/UL (ref 0–0.04)
IMM GRANULOCYTES NFR BLD AUTO: 0.2 % (ref 0–0.5)
LYMPHOCYTES # BLD AUTO: 1.4 K/UL (ref 1–4.8)
LYMPHOCYTES NFR BLD: 23.9 % (ref 18–48)
MCH RBC QN AUTO: 27.6 PG (ref 27–31)
MCHC RBC AUTO-ENTMCNC: 31.4 G/DL (ref 32–36)
MCV RBC AUTO: 88 FL (ref 82–98)
MONOCYTES # BLD AUTO: 0.3 K/UL (ref 0.3–1)
MONOCYTES NFR BLD: 5.7 % (ref 4–15)
NEUTROPHILS # BLD AUTO: 4 K/UL (ref 1.8–7.7)
NEUTROPHILS NFR BLD: 67.4 % (ref 38–73)
NRBC BLD-RTO: 0 /100 WBC
PLATELET # BLD AUTO: 259 K/UL (ref 150–450)
PMV BLD AUTO: 9.9 FL (ref 9.2–12.9)
POTASSIUM SERPL-SCNC: 4.6 MMOL/L (ref 3.5–5.1)
PROT SERPL-MCNC: 6.7 G/DL (ref 6–8.4)
RBC # BLD AUTO: 4.49 M/UL (ref 4.6–6.2)
SODIUM SERPL-SCNC: 140 MMOL/L (ref 136–145)
WBC # BLD AUTO: 5.98 K/UL (ref 3.9–12.7)

## 2023-09-01 PROCEDURE — 80053 COMPREHEN METABOLIC PANEL: CPT | Performed by: STUDENT IN AN ORGANIZED HEALTH CARE EDUCATION/TRAINING PROGRAM

## 2023-09-01 PROCEDURE — 85025 COMPLETE CBC W/AUTO DIFF WBC: CPT | Performed by: STUDENT IN AN ORGANIZED HEALTH CARE EDUCATION/TRAINING PROGRAM

## 2023-09-01 PROCEDURE — 36415 COLL VENOUS BLD VENIPUNCTURE: CPT | Mod: PO | Performed by: STUDENT IN AN ORGANIZED HEALTH CARE EDUCATION/TRAINING PROGRAM

## 2023-09-01 PROCEDURE — 85651 RBC SED RATE NONAUTOMATED: CPT | Mod: PO | Performed by: STUDENT IN AN ORGANIZED HEALTH CARE EDUCATION/TRAINING PROGRAM

## 2023-09-01 PROCEDURE — 86140 C-REACTIVE PROTEIN: CPT | Performed by: STUDENT IN AN ORGANIZED HEALTH CARE EDUCATION/TRAINING PROGRAM

## 2023-09-01 RX ORDER — CLOTRIMAZOLE AND BETAMETHASONE DIPROPIONATE 10; .64 MG/G; MG/G
CREAM TOPICAL 2 TIMES DAILY
Qty: 15 G | Refills: 1 | Status: SHIPPED | OUTPATIENT
Start: 2023-09-01 | End: 2023-09-05 | Stop reason: SDUPTHER

## 2023-09-01 NOTE — TELEPHONE ENCOUNTER
----- Message from Steffanie Jha sent at 9/1/2023  9:51 AM CDT -----  Pt stated that he was told by the pharmacy that he needs fabián's approval for a clotrimazole cream to get a refill.  [phone]

## 2023-09-05 RX ORDER — CLOTRIMAZOLE AND BETAMETHASONE DIPROPIONATE 10; .64 MG/G; MG/G
CREAM TOPICAL 2 TIMES DAILY
Qty: 15 G | Refills: 1 | Status: SHIPPED | OUTPATIENT
Start: 2023-09-05

## 2023-09-05 NOTE — TELEPHONE ENCOUNTER
----- Message from Steffanie Jha sent at 9/5/2023  4:22 PM CDT -----  Pt needs a refill for clotrimazole betamethasone dipropionate cream.  [phone]

## 2023-09-07 NOTE — PROGRESS NOTES
"Subjective:      Patient ID: Kyrie Norton Jr. is a 73 y.o. male.    Chief Complaint: Disease Management    HPI    Rheumatologic History:   - Diagnosis/es:              - seronegative rheumatoid arthritis  - Positive serologies: -  - Negative serologies: RF, CCP  - Infectious screening labs:  Hepatitis-B, C (09/2022); positive TB gold  (09/2022)  - Imaging:              - DEXA scan (8/2022) Normal BMD              - Xray arthritis survey (9/2022) no obvious erosive changes  - Previous Treatments:              - Prednisone              - HCQ 400mg daily: Ineffective  - Current Treatments:               - MTX 17.5mg weekly plus folic acid daily   - Humira biweekly (6/2023- )  - Prednisone 2.5  - Plaquenil eye exam: No toxicity (4/2022)     Interval History:  Last visit, PA was requested for Humira. He has had significant improvement in his joint pain and swelling.     Objective:   BP (!) 148/76   Pulse 67   Ht 5' 7" (1.702 m)   Wt 75 kg (165 lb 5.5 oz)   BMI 25.90 kg/m²   Physical Exam   Constitutional: normal appearance.   HENT:   Head: Normocephalic and atraumatic.   Cardiovascular: Normal rate, regular rhythm and normal heart sounds.   Pulmonary/Chest: Effort normal and breath sounds normal.   Musculoskeletal:      Comments: + Heberden's and Letha's nodes  No synovitis, dactylitis, enthesitis, effusions     Neurological: He is alert.   Skin: Skin is warm and dry. No rash noted.   No skin thickening, telangiectasias, calcinosis, psoriasiform lesions, lupoid lesions        6/5/2023 9/8/2023   Tender (JIMENEZ-28) 4 / 28  0 / 28    Swollen (JIMENZE-28) 0 / 28  0 / 28    Provider Global -- 20 mm   Patient Global 50 mm 20 mm   ESR 5 mm/hr 0 mm/hr   CRP 1.3 mg/L --   JIMENEZ-28 (ESR) 2.95 (Low disease activity) --   JIMENEZ-28 (CRP) 3.08 (Low disease activity) --   CDAI Score -- 4      Labs independently reviewed by me (09/01/2023)  CBC HGB 12.4, WBC, ANC, platelets WNL   CMP WNL  CRP <0.3  ESR 0    Assessment:     1. Rheumatoid " arthritis involving multiple sites with positive rheumatoid factor    2. Primary osteoarthritis involving multiple joints    3. Drug-induced immunodeficiency    4. High risk medication use    5. Encounter for long-term (current) drug use      This is a 73-year-old man with past medical history of HLD, T2DM, positive quantiferon with borderline T-spot but no symptoms s/p prophylactic therapy, and seronegative rheumatoid arthritis on methotrexate 17.5 mg weekly, Humira biweekly, and prednisone 2.5mg. CDAI is consistent with low disease activity. Physical examination shows degenerative changes, but no active synovitis. Continue current medication and attempt to discontinue prednisone. May try Mobic if necessary.    Plan:     Problem List Items Addressed This Visit          Immunology/Multi System    Rheumatoid arthritis - Primary    Relevant Orders    CBC Auto Differential    Comprehensive Metabolic Panel    Sedimentation rate    C-Reactive Protein     Other Visit Diagnoses       Primary osteoarthritis involving multiple joints        Drug-induced immunodeficiency        Relevant Orders    CBC Auto Differential    Comprehensive Metabolic Panel    Sedimentation rate    C-Reactive Protein    High risk medication use        Relevant Orders    CBC Auto Differential    Comprehensive Metabolic Panel    Sedimentation rate    C-Reactive Protein    Encounter for long-term (current) drug use              - Humira biweekly  - MTX 17.5mg weekly plus folic acid daily   - DC prednisone  - CBC, CMP, ESR, CRP every 12 weeks  - Pre-DMARD labs due for repeat 9/2023  - DEXA due for repeat 8/2024  - Immunizations: Zoster live (2015), flu (1/22), PCV13 (2020), PCV20 (9/2022)    Follow up in 4 months    30 minutes of total time spent on the encounter, which includes face to face time and non-face to face time preparing to see the patient (eg, review of tests), Obtaining and/or reviewing separately obtained history, Documenting clinical  information in the electronic or other health record, Independently interpreting results (not separately reported) and communicating results to the patient/family/caregiver, or Care coordination (not separately reported).       Nitza Ruiz M.D.  Rheumatology Dept  [place], [place]

## 2023-09-08 ENCOUNTER — OFFICE VISIT (OUTPATIENT)
Dept: RHEUMATOLOGY | Facility: CLINIC | Age: 73
End: 2023-09-08
Payer: MEDICARE

## 2023-09-08 VITALS
WEIGHT: 165.38 LBS | SYSTOLIC BLOOD PRESSURE: 148 MMHG | DIASTOLIC BLOOD PRESSURE: 76 MMHG | HEIGHT: 67 IN | BODY MASS INDEX: 25.96 KG/M2 | HEART RATE: 67 BPM

## 2023-09-08 DIAGNOSIS — Z79.899 HIGH RISK MEDICATION USE: ICD-10-CM

## 2023-09-08 DIAGNOSIS — M15.9 PRIMARY OSTEOARTHRITIS INVOLVING MULTIPLE JOINTS: ICD-10-CM

## 2023-09-08 DIAGNOSIS — M05.79 RHEUMATOID ARTHRITIS INVOLVING MULTIPLE SITES WITH POSITIVE RHEUMATOID FACTOR: Primary | ICD-10-CM

## 2023-09-08 DIAGNOSIS — Z79.899 DRUG-INDUCED IMMUNODEFICIENCY: ICD-10-CM

## 2023-09-08 DIAGNOSIS — D84.821 DRUG-INDUCED IMMUNODEFICIENCY: ICD-10-CM

## 2023-09-08 DIAGNOSIS — Z79.899 ENCOUNTER FOR LONG-TERM (CURRENT) DRUG USE: ICD-10-CM

## 2023-09-08 PROCEDURE — 3077F PR MOST RECENT SYSTOLIC BLOOD PRESSURE >= 140 MM HG: ICD-10-PCS | Mod: CPTII,S$GLB,, | Performed by: STUDENT IN AN ORGANIZED HEALTH CARE EDUCATION/TRAINING PROGRAM

## 2023-09-08 PROCEDURE — 3078F PR MOST RECENT DIASTOLIC BLOOD PRESSURE < 80 MM HG: ICD-10-PCS | Mod: CPTII,S$GLB,, | Performed by: STUDENT IN AN ORGANIZED HEALTH CARE EDUCATION/TRAINING PROGRAM

## 2023-09-08 PROCEDURE — 3008F PR BODY MASS INDEX (BMI) DOCUMENTED: ICD-10-PCS | Mod: CPTII,S$GLB,, | Performed by: STUDENT IN AN ORGANIZED HEALTH CARE EDUCATION/TRAINING PROGRAM

## 2023-09-08 PROCEDURE — 99215 PR OFFICE/OUTPT VISIT, EST, LEVL V, 40-54 MIN: ICD-10-PCS | Mod: S$GLB,,, | Performed by: STUDENT IN AN ORGANIZED HEALTH CARE EDUCATION/TRAINING PROGRAM

## 2023-09-08 PROCEDURE — 99215 OFFICE O/P EST HI 40 MIN: CPT | Mod: S$GLB,,, | Performed by: STUDENT IN AN ORGANIZED HEALTH CARE EDUCATION/TRAINING PROGRAM

## 2023-09-08 PROCEDURE — 3008F BODY MASS INDEX DOCD: CPT | Mod: CPTII,S$GLB,, | Performed by: STUDENT IN AN ORGANIZED HEALTH CARE EDUCATION/TRAINING PROGRAM

## 2023-09-08 PROCEDURE — 1160F PR REVIEW ALL MEDS BY PRESCRIBER/CLIN PHARMACIST DOCUMENTED: ICD-10-PCS | Mod: CPTII,S$GLB,, | Performed by: STUDENT IN AN ORGANIZED HEALTH CARE EDUCATION/TRAINING PROGRAM

## 2023-09-08 PROCEDURE — 1160F RVW MEDS BY RX/DR IN RCRD: CPT | Mod: CPTII,S$GLB,, | Performed by: STUDENT IN AN ORGANIZED HEALTH CARE EDUCATION/TRAINING PROGRAM

## 2023-09-08 PROCEDURE — 99999 PR PBB SHADOW E&M-EST. PATIENT-LVL IV: ICD-10-PCS | Mod: PBBFAC,,, | Performed by: STUDENT IN AN ORGANIZED HEALTH CARE EDUCATION/TRAINING PROGRAM

## 2023-09-08 PROCEDURE — 1159F PR MEDICATION LIST DOCUMENTED IN MEDICAL RECORD: ICD-10-PCS | Mod: CPTII,S$GLB,, | Performed by: STUDENT IN AN ORGANIZED HEALTH CARE EDUCATION/TRAINING PROGRAM

## 2023-09-08 PROCEDURE — 3078F DIAST BP <80 MM HG: CPT | Mod: CPTII,S$GLB,, | Performed by: STUDENT IN AN ORGANIZED HEALTH CARE EDUCATION/TRAINING PROGRAM

## 2023-09-08 PROCEDURE — 3044F PR MOST RECENT HEMOGLOBIN A1C LEVEL <7.0%: ICD-10-PCS | Mod: CPTII,S$GLB,, | Performed by: STUDENT IN AN ORGANIZED HEALTH CARE EDUCATION/TRAINING PROGRAM

## 2023-09-08 PROCEDURE — 3077F SYST BP >= 140 MM HG: CPT | Mod: CPTII,S$GLB,, | Performed by: STUDENT IN AN ORGANIZED HEALTH CARE EDUCATION/TRAINING PROGRAM

## 2023-09-08 PROCEDURE — 1159F MED LIST DOCD IN RCRD: CPT | Mod: CPTII,S$GLB,, | Performed by: STUDENT IN AN ORGANIZED HEALTH CARE EDUCATION/TRAINING PROGRAM

## 2023-09-08 PROCEDURE — 99999 PR PBB SHADOW E&M-EST. PATIENT-LVL IV: CPT | Mod: PBBFAC,,, | Performed by: STUDENT IN AN ORGANIZED HEALTH CARE EDUCATION/TRAINING PROGRAM

## 2023-09-08 PROCEDURE — 3044F HG A1C LEVEL LT 7.0%: CPT | Mod: CPTII,S$GLB,, | Performed by: STUDENT IN AN ORGANIZED HEALTH CARE EDUCATION/TRAINING PROGRAM

## 2023-09-08 ASSESSMENT — ROUTINE ASSESSMENT OF PATIENT INDEX DATA (RAPID3)
PATIENT GLOBAL ASSESSMENT SCORE: 0.5
MDHAQ FUNCTION SCORE: 0
PAIN SCORE: 1
TOTAL RAPID3 SCORE: 0.5
FATIGUE SCORE: 0
PSYCHOLOGICAL DISTRESS SCORE: 0

## 2023-09-12 ENCOUNTER — TELEPHONE (OUTPATIENT)
Dept: RHEUMATOLOGY | Facility: CLINIC | Age: 73
End: 2023-09-12
Payer: MEDICARE

## 2023-09-12 DIAGNOSIS — M05.79 RHEUMATOID ARTHRITIS INVOLVING MULTIPLE SITES WITH POSITIVE RHEUMATOID FACTOR: ICD-10-CM

## 2023-09-12 RX ORDER — METHOTREXATE 2.5 MG/1
17.5 TABLET ORAL
Qty: 84 TABLET | Refills: 1 | Status: SHIPPED | OUTPATIENT
Start: 2023-09-12 | End: 2024-01-26 | Stop reason: SDUPTHER

## 2023-09-12 NOTE — TELEPHONE ENCOUNTER
----- Message from Manny Dodson sent at 9/12/2023 11:33 AM CDT -----  Type: Needs Medical Advice  Who Called:  Patient    Pharmacy name and phone #:    [id number] - [place] - [address]  [address] [place] [address]  Phone: [phone] Fax: [phone]      Best Call Back Number: [phone]  Additional Information: Patient states that he would like a callback regarding is refill needing a PA:  predniSONE (DELTASONE) 5 MG tablet

## 2023-09-12 NOTE — TELEPHONE ENCOUNTER
----- Message from Manny Dodson sent at 9/12/2023 11:33 AM CDT -----  Type: Needs Medical Advice  Who Called:  Patient    Pharmacy name and phone #:    [id number] - [place] - [address]  [address] [place] [address]  Phone: [phone] Fax: [phone]      Best Call Back Number: [phone]  Additional Information: Patient states that he would like a callback regarding is refill needing a PA:  predniSONE (DELTASONE) 5 MG tablet

## 2023-10-05 ENCOUNTER — PATIENT OUTREACH (OUTPATIENT)
Dept: ADMINISTRATIVE | Facility: HOSPITAL | Age: 73
End: 2023-10-05
Payer: MEDICARE

## 2023-10-05 ENCOUNTER — TELEPHONE (OUTPATIENT)
Dept: OPTOMETRY | Facility: CLINIC | Age: 73
End: 2023-10-05
Payer: MEDICARE

## 2023-10-05 NOTE — PROGRESS NOTES
Population Health Chart Review & Patient Outreach Details:     Reason for Outreach Encounter:     []  Non-Compliant Report   [x]  Payor Report ([employer], PHN, BCBS, MSSP, MCIP, UHC, etc.)   []  Pre-Visit Chart Review     Updates Requested / Reviewed:     []  Care Everywhere    []     []  External Sources ([facility], [facility], DIS, etc.)   [x]  Care Team Updated    Patient Outreach Method:    [x]  Telephone Outreach Completed   [x] Successful   [] Left Voicemail   [] Unable to Contact (wrong number, no voicemail)  []  [facility] Outreach Sent  []  Letter Outreach Mailed  []  Fax Sent for External Records  []  External Records Upload    Health Maintenance Topics Addressed and Outreach Outcomes / Actions Taken:        []      Breast Cancer Screening []  Mammo Scheduled      []  External Records Requested     []  Added Reminder to Complete to Upcoming Primary Care Appt Notes     []  Patient Declined     []  Patient Will Call Back to Schedule     []  Patient Will Schedule with External Provider / Order Routed if Applicable             []       Cervical Cancer Screening []  Pap Scheduled      []  External Records Requested     []  Added Reminder to Complete to Upcoming Primary Care Appt Notes     []  Patient Declined     []  Patient Will Call Back to Schedule     []  Patient Will Schedule with External Provider               []          Colorectal Cancer Screening []  Colonoscopy Case Request or Referral Placed     []  External Records Requested     []  Added Reminder to Complete to Upcoming Primary Care Appt Notes     []  Patient Declined     []  Patient Will Call Back to Schedule     []  Patient Will Schedule with External Provider     []  Fit Kit Mailed (add the SmartPhrase under additional notes)     []  Reminded Patient to Complete Home Test             [x]      Diabetic Eye Exam []  Eye Camera Scheduled or Optometry Referral Placed     []  External Records Requested     []  Added Reminder to Complete to  Upcoming Primary Care Appt Notes     []  Patient Declined     [x]  Patient Will Call Back to Schedule     []  Patient Will Schedule with External Provider             []      Blood Pressure Control []  Primary Care Follow Up Visit Scheduled     []  Remote Blood Pressure Reading Captured     []  Added Reminder to Complete to Upcoming Primary Care Appt Notes     []  Patient Declined     []  Patient Will Call Back / Patient Will Send Portal Message with Reading     []  Patient Will Call Back to Schedule Provider Visit             []       HbA1c & Other Labs []  Lab Appt Scheduled for Due Labs     []  Primary Care Follow Up Visit Scheduled      []  Reminded Patient to Complete Home Test     []  Added Reminder to Complete to Upcoming Primary Care Appt Notes     []  Patient Declined     []  Patient Will Call Back to Schedule     []  Patient Will Schedule with External Provider / Order Routed if Applicable           []    Schedule Primary Care Appt []  Primary Care Appt Scheduled     []  Patient Declined     []  Patient Will Call Back to Schedule     []  Pt Established with External Provider & Updated Care Team             []      Medication Adherence []  Primary Care Appointment Scheduled     []  Added Reminder to Upcoming Primary Care Appt Notes     []  Patient Reminded to  Prescription     []  Patient Declined, Provider Notified if Needed     []  Sent Provider Message to Review and/or Add Exclusion to Problem List             []      Osteoporosis Screening []  DXA Appointment Scheduled     []  External Records Requested     []  Added Reminder to Complete to Upcoming Primary Care Appt Notes     []  Patient Declined     []  Patient Will Call Back to Schedule     []  Patient Will Schedule with External Provider / Order Routed if Applicable     Additional Care Coordinator Notes:         Further Action Needed If Patient Returns Outreach:

## 2023-10-05 NOTE — TELEPHONE ENCOUNTER
Spoke to pt and scheduled overdue hvf and exam       ----- Message from José Miguel Simpson sent at 10/5/2023  8:27 AM CDT -----  Contact: self  Type:  Sooner Appointment Request    Caller is requesting a sooner appointment.  Caller declined first available appointment listed below.  Caller will not accept being placed on the waitlist and is requesting a message be sent to doctor.    Name of Caller:  Patient  When is the first available appointment?  1/10  Symptoms:  Cataract   Best Call Back Number:  [phone]    Additional Information:  Pt states he would like to be seen sooner for an appt.Please call back and advise

## 2023-10-25 LAB
LEFT EYE DM RETINOPATHY: NEGATIVE
RIGHT EYE DM RETINOPATHY: NEGATIVE

## 2023-10-27 ENCOUNTER — PATIENT OUTREACH (OUTPATIENT)
Dept: ADMINISTRATIVE | Facility: HOSPITAL | Age: 73
End: 2023-10-27
Payer: MEDICARE

## 2023-10-27 NOTE — PROGRESS NOTES
Population Health Chart Review & Patient Outreach Details    Outreach Performed: NO    Further Action Needed If Patient Returns Outreach:            Updates Requested / Reviewed:      Care Team Updated         Health Maintenance Topics     Health Maintenance Due   Topic Date Due    Shingles Vaccine (1 of 2) 08/12/2015    Diabetes Urine Screening  06/07/2022    Influenza Vaccine (1) 09/01/2023    COVID-19 Vaccine (3 - 2023-24 season) 09/01/2023            Breast Cancer Screening   Not Addressed     N/A             Cervical Cancer Screening   Not Addressed     N/A            Colorectal Cancer Screening  Not Addressed     N/A            Eye Exam  Addressed     Diabetic Eye External Records Uploaded, Care Team & History Updated if Applicable            Blood Pressure Control  Not Addressed     N/A             HbA1c & Other Labs  Not Addressed     N/A            Primary Care Appointment  Not Addressed     N/A            Medication Adherence / Statin Use  Not Addressed     N/A            Osteoporosis Screening  Not Addressed     N/A         Additional Notes:

## 2023-11-28 DIAGNOSIS — M05.79 RHEUMATOID ARTHRITIS INVOLVING MULTIPLE SITES WITH POSITIVE RHEUMATOID FACTOR: ICD-10-CM

## 2023-11-28 RX ORDER — ADALIMUMAB 40MG/0.4ML
40 KIT SUBCUTANEOUS
Qty: 6 PEN | Refills: 1 | Status: ACTIVE | OUTPATIENT
Start: 2023-11-28 | End: 2024-01-08 | Stop reason: SDUPTHER

## 2023-12-01 DIAGNOSIS — M05.79 RHEUMATOID ARTHRITIS INVOLVING MULTIPLE SITES WITH POSITIVE RHEUMATOID FACTOR: ICD-10-CM

## 2023-12-01 RX ORDER — PREDNISONE 5 MG/1
5 TABLET ORAL DAILY
Qty: 90 TABLET | Refills: 1 | Status: SHIPPED | OUTPATIENT
Start: 2023-12-01 | End: 2024-01-26

## 2023-12-01 NOTE — TELEPHONE ENCOUNTER
Pharmacy requesting refill on Prednisone 5mg  Pt's LOV 09/08/2023  Pt's NOV 01/26/2024  Medication pending

## 2023-12-27 ENCOUNTER — TELEPHONE (OUTPATIENT)
Dept: RHEUMATOLOGY | Facility: CLINIC | Age: 73
End: 2023-12-27
Payer: MEDICARE

## 2023-12-27 NOTE — TELEPHONE ENCOUNTER
----- Message from Char Bonds sent at 12/27/2023 11:28 AM CST -----  Type: Needs Medical Advice  Who Called:  pt  Best Call Back Number: [phone]    Additional Information: pt is calling in regards to one of his adalimumab (HUMIRA,CF, PEN) 40 mg/0.4 mL PnKt  failed. On Friday, 12/22 the pen did not work so the serum did not come out. Pt did not have a reaction to the missed injection and will resume scheduled injections today. Jama will call the office to inform the DrRandolph As well. Please call back and advise. Thanks!

## 2023-12-28 ENCOUNTER — TELEPHONE (OUTPATIENT)
Dept: RHEUMATOLOGY | Facility: CLINIC | Age: 73
End: 2023-12-28
Payer: MEDICARE

## 2023-12-28 DIAGNOSIS — M05.79 RHEUMATOID ARTHRITIS INVOLVING MULTIPLE SITES WITH POSITIVE RHEUMATOID FACTOR: Primary | ICD-10-CM

## 2023-12-28 RX ORDER — ADALIMUMAB 40MG/0.4ML
40 KIT SUBCUTANEOUS ONCE
Qty: 1 PEN | Refills: 0 | Status: ACTIVE | OUTPATIENT
Start: 2023-12-28 | End: 2023-12-28

## 2023-12-28 NOTE — TELEPHONE ENCOUNTER
Notify STEPHEN Castaneda lead that the office is out of Humira samples. Rx sent to pharmacy for pen to replace pen that misfired

## 2023-12-28 NOTE — TELEPHONE ENCOUNTER
----- Message from Alicia Lora LPN sent at 12/27/2023  8:11 AM CST -----  Regarding: FW: Humira misfire  Do you want me to give this patient a sample?  ----- Message -----  From: Peter Grimes, PharmD  Sent: 12/22/2023   2:04 PM CST  To: Nitza Ruiz MD; #  Subject: Humira misfire                                   Hello,    This is Peter from [facility]. We received a call from Mr. Norton who said his Humira pen misfired. I told him to call the  to request replacement. They will need a Rx for the replacement. Can you please send us a Humira 40 mg Rx with just one pen and no refills on it and we will forward it to the correct place?     Thanks!    Peter Grimes, PharmD  Clinical Pharmacist  [facility]  [phone]

## 2023-12-28 NOTE — TELEPHONE ENCOUNTER
Staff,  Yes, ok to offer patient a sample if needed. Since patient misfired, please check with patient if he would like an appointment with me for education and teaching. Sent rx to [facility]

## 2024-01-02 ENCOUNTER — TELEPHONE (OUTPATIENT)
Dept: RHEUMATOLOGY | Facility: CLINIC | Age: 74
End: 2024-01-02
Payer: MEDICARE

## 2024-01-02 NOTE — TELEPHONE ENCOUNTER
Spoke to the pharmacy to do a product replacement Humira pen. Order has been sent and will call the patient with shipment details.  ----- Message from Cary Garcia sent at 1/2/2024  4:10 PM CST -----  Type:  Pharmacy Calling to Clarify an RX    Name of Caller:  Chika / [facility]  Pharmacy Name:    [facility]  [phone], fax [phone]      Prescription Name:   Disp Refills Start End   adalimumab (HUMIRA,CF, PEN) 40 mg/0.4 mL PnKt 6 pen 1 11/28/2023 5/26/2024   Sig - Route: Inject 0.4 mLs (40 mg total) into the skin every 14 (fourteen) days. - Subcutaneous   Sent to pharmacy as: adalimumab (HUMIRA,CF, PEN) 40 mg/0.4 mL PnKt   Class: Fill Later   No prior authorization was found for this prescription.   Found prior authorization for another prescription for the same medication: Approved       What do they need to clarify?:  Needs Rx for qty 1 pen to replace misfire pen  Best Call Back Number:  [phone], fax [phone]    Additional Information:  Can take verbal, fax, or escribe

## 2024-01-08 DIAGNOSIS — M05.79 RHEUMATOID ARTHRITIS INVOLVING MULTIPLE SITES WITH POSITIVE RHEUMATOID FACTOR: ICD-10-CM

## 2024-01-08 RX ORDER — ADALIMUMAB 40MG/0.4ML
40 KIT SUBCUTANEOUS
Qty: 6 PEN | Refills: 1 | Status: ACTIVE | OUTPATIENT
Start: 2024-01-08 | End: 2024-07-06

## 2024-01-09 ENCOUNTER — TELEPHONE (OUTPATIENT)
Dept: RHEUMATOLOGY | Facility: CLINIC | Age: 74
End: 2024-01-09
Payer: MEDICARE

## 2024-01-09 NOTE — TELEPHONE ENCOUNTER
Talked to the pharmacy about Humira replacement pen. It was requested for 2 pens, Provider sent in one replacement. Caller put in a note for one and waiting to talk to the patient about delivery.   ----- Message from Winnie Aburto sent at 1/9/2024  9:43 AM CST -----  Regarding: pharmacy solution called  Name of Who is Calling: KARIN RIOS JR. [[id number]] Mami       What is the request in detail: requesting Alicia to contact her about verbal order for patient on the medication Humira. They need additional approval for the pen. Please advise       Can the clinic reply by [facility]:       What Number to Call Back if not in [facility]: [phone]

## 2024-01-09 NOTE — TELEPHONE ENCOUNTER
Incoming call from pharmacy and stated that they were out of stock for the one pen, and needed authorization for the 2 pens. Gave authorization and will get with the patient for shipment.

## 2024-01-18 ENCOUNTER — LAB VISIT (OUTPATIENT)
Dept: LAB | Facility: HOSPITAL | Age: 74
End: 2024-01-18
Attending: STUDENT IN AN ORGANIZED HEALTH CARE EDUCATION/TRAINING PROGRAM
Payer: MEDICARE

## 2024-01-18 DIAGNOSIS — Z79.899 DRUG-INDUCED IMMUNODEFICIENCY: ICD-10-CM

## 2024-01-18 DIAGNOSIS — D84.821 DRUG-INDUCED IMMUNODEFICIENCY: ICD-10-CM

## 2024-01-18 DIAGNOSIS — M05.79 RHEUMATOID ARTHRITIS INVOLVING MULTIPLE SITES WITH POSITIVE RHEUMATOID FACTOR: ICD-10-CM

## 2024-01-18 DIAGNOSIS — Z79.899 HIGH RISK MEDICATION USE: ICD-10-CM

## 2024-01-18 LAB
ALBUMIN SERPL BCP-MCNC: 4 G/DL (ref 3.5–5.2)
ALP SERPL-CCNC: 52 U/L (ref 55–135)
ALT SERPL W/O P-5'-P-CCNC: 21 U/L (ref 10–44)
ANION GAP SERPL CALC-SCNC: 7 MMOL/L (ref 8–16)
AST SERPL-CCNC: 20 U/L (ref 10–40)
BASOPHILS # BLD AUTO: 0.02 K/UL (ref 0–0.2)
BASOPHILS NFR BLD: 0.4 % (ref 0–1.9)
BILIRUB SERPL-MCNC: 1.4 MG/DL (ref 0.1–1)
BUN SERPL-MCNC: 10 MG/DL (ref 8–23)
CALCIUM SERPL-MCNC: 9.6 MG/DL (ref 8.7–10.5)
CHLORIDE SERPL-SCNC: 106 MMOL/L (ref 95–110)
CO2 SERPL-SCNC: 28 MMOL/L (ref 23–29)
CREAT SERPL-MCNC: 0.8 MG/DL (ref 0.5–1.4)
CRP SERPL-MCNC: 0.4 MG/L (ref 0–8.2)
DIFFERENTIAL METHOD BLD: ABNORMAL
EOSINOPHIL # BLD AUTO: 0.3 K/UL (ref 0–0.5)
EOSINOPHIL NFR BLD: 6.7 % (ref 0–8)
ERYTHROCYTE [DISTWIDTH] IN BLOOD BY AUTOMATED COUNT: 14.1 % (ref 11.5–14.5)
ERYTHROCYTE [SEDIMENTATION RATE] IN BLOOD BY WESTERGREN METHOD: 2 MM/HR (ref 0–10)
EST. GFR  (NO RACE VARIABLE): >60 ML/MIN/1.73 M^2
GLUCOSE SERPL-MCNC: 90 MG/DL (ref 70–110)
HCT VFR BLD AUTO: 42 % (ref 40–54)
HGB BLD-MCNC: 13.5 G/DL (ref 14–18)
IMM GRANULOCYTES # BLD AUTO: 0.01 K/UL (ref 0–0.04)
IMM GRANULOCYTES NFR BLD AUTO: 0.2 % (ref 0–0.5)
LYMPHOCYTES # BLD AUTO: 1.7 K/UL (ref 1–4.8)
LYMPHOCYTES NFR BLD: 36.7 % (ref 18–48)
MCH RBC QN AUTO: 29.6 PG (ref 27–31)
MCHC RBC AUTO-ENTMCNC: 32.1 G/DL (ref 32–36)
MCV RBC AUTO: 92 FL (ref 82–98)
MONOCYTES # BLD AUTO: 0.3 K/UL (ref 0.3–1)
MONOCYTES NFR BLD: 7.6 % (ref 4–15)
NEUTROPHILS # BLD AUTO: 2.2 K/UL (ref 1.8–7.7)
NEUTROPHILS NFR BLD: 48.4 % (ref 38–73)
NRBC BLD-RTO: 0 /100 WBC
PLATELET # BLD AUTO: 239 K/UL (ref 150–450)
PMV BLD AUTO: 10.4 FL (ref 9.2–12.9)
POTASSIUM SERPL-SCNC: 4.2 MMOL/L (ref 3.5–5.1)
PROT SERPL-MCNC: 6.8 G/DL (ref 6–8.4)
RBC # BLD AUTO: 4.56 M/UL (ref 4.6–6.2)
SODIUM SERPL-SCNC: 141 MMOL/L (ref 136–145)
WBC # BLD AUTO: 4.5 K/UL (ref 3.9–12.7)

## 2024-01-18 PROCEDURE — 86140 C-REACTIVE PROTEIN: CPT | Performed by: STUDENT IN AN ORGANIZED HEALTH CARE EDUCATION/TRAINING PROGRAM

## 2024-01-18 PROCEDURE — 85025 COMPLETE CBC W/AUTO DIFF WBC: CPT | Performed by: STUDENT IN AN ORGANIZED HEALTH CARE EDUCATION/TRAINING PROGRAM

## 2024-01-18 PROCEDURE — 80053 COMPREHEN METABOLIC PANEL: CPT | Performed by: STUDENT IN AN ORGANIZED HEALTH CARE EDUCATION/TRAINING PROGRAM

## 2024-01-18 PROCEDURE — 36415 COLL VENOUS BLD VENIPUNCTURE: CPT | Mod: PO | Performed by: STUDENT IN AN ORGANIZED HEALTH CARE EDUCATION/TRAINING PROGRAM

## 2024-01-18 PROCEDURE — 85651 RBC SED RATE NONAUTOMATED: CPT | Mod: PO | Performed by: STUDENT IN AN ORGANIZED HEALTH CARE EDUCATION/TRAINING PROGRAM

## 2024-01-19 ENCOUNTER — CLINICAL SUPPORT (OUTPATIENT)
Dept: OPHTHALMOLOGY | Facility: CLINIC | Age: 74
End: 2024-01-19
Payer: MEDICARE

## 2024-01-19 ENCOUNTER — OFFICE VISIT (OUTPATIENT)
Dept: OPTOMETRY | Facility: CLINIC | Age: 74
End: 2024-01-19
Payer: MEDICARE

## 2024-01-19 DIAGNOSIS — E11.9 DIABETES MELLITUS TYPE 2 WITHOUT RETINOPATHY: ICD-10-CM

## 2024-01-19 DIAGNOSIS — H35.372 EPIRETINAL MEMBRANE (ERM) OF LEFT EYE: ICD-10-CM

## 2024-01-19 DIAGNOSIS — H25.13 NUCLEAR SCLEROSIS, BILATERAL: ICD-10-CM

## 2024-01-19 DIAGNOSIS — H40.1122 PRIMARY OPEN ANGLE GLAUCOMA (POAG) OF LEFT EYE, MODERATE STAGE: Primary | ICD-10-CM

## 2024-01-19 DIAGNOSIS — D31.32 CHOROIDAL NEVUS OF LEFT EYE: ICD-10-CM

## 2024-01-19 DIAGNOSIS — H40.053 BILATERAL OCULAR HYPERTENSION: ICD-10-CM

## 2024-01-19 DIAGNOSIS — H40.1111 PRIMARY OPEN ANGLE GLAUCOMA (POAG) OF RIGHT EYE, MILD STAGE: ICD-10-CM

## 2024-01-19 DIAGNOSIS — H53.031 STRABISMIC AMBLYOPIA OF RIGHT EYE: ICD-10-CM

## 2024-01-19 DIAGNOSIS — H52.7 REFRACTIVE ERROR: ICD-10-CM

## 2024-01-19 DIAGNOSIS — H26.9 CORTICAL CATARACT: ICD-10-CM

## 2024-01-19 PROCEDURE — 1101F PT FALLS ASSESS-DOCD LE1/YR: CPT | Mod: CPTII,S$GLB,, | Performed by: OPTOMETRIST

## 2024-01-19 PROCEDURE — 3288F FALL RISK ASSESSMENT DOCD: CPT | Mod: CPTII,S$GLB,, | Performed by: OPTOMETRIST

## 2024-01-19 PROCEDURE — 92083 EXTENDED VISUAL FIELD XM: CPT | Mod: S$GLB,,, | Performed by: OPTOMETRIST

## 2024-01-19 PROCEDURE — 1126F AMNT PAIN NOTED NONE PRSNT: CPT | Mod: CPTII,S$GLB,, | Performed by: OPTOMETRIST

## 2024-01-19 PROCEDURE — 92133 CPTRZD OPH DX IMG PST SGM ON: CPT | Mod: S$GLB,,, | Performed by: OPTOMETRIST

## 2024-01-19 PROCEDURE — 2023F DILAT RTA XM W/O RTNOPTHY: CPT | Mod: CPTII,S$GLB,, | Performed by: OPTOMETRIST

## 2024-01-19 PROCEDURE — 1160F RVW MEDS BY RX/DR IN RCRD: CPT | Mod: CPTII,S$GLB,, | Performed by: OPTOMETRIST

## 2024-01-19 PROCEDURE — 99214 OFFICE O/P EST MOD 30 MIN: CPT | Mod: S$GLB,,, | Performed by: OPTOMETRIST

## 2024-01-19 PROCEDURE — 1159F MED LIST DOCD IN RCRD: CPT | Mod: CPTII,S$GLB,, | Performed by: OPTOMETRIST

## 2024-01-19 PROCEDURE — 92015 DETERMINE REFRACTIVE STATE: CPT | Mod: S$GLB,,, | Performed by: OPTOMETRIST

## 2024-01-19 PROCEDURE — 99999 PR PBB SHADOW E&M-EST. PATIENT-LVL III: CPT | Mod: PBBFAC,,, | Performed by: OPTOMETRIST

## 2024-01-19 RX ORDER — LATANOPROST 50 UG/ML
SOLUTION/ DROPS OPHTHALMIC
Qty: 7.5 ML | Refills: 0 | Status: SHIPPED | OUTPATIENT
Start: 2024-01-19 | End: 2024-02-29

## 2024-01-19 RX ORDER — TIMOLOL MALEATE 5 MG/ML
1 SOLUTION/ DROPS OPHTHALMIC DAILY
Qty: 10 ML | Refills: 1 | Status: SHIPPED | OUTPATIENT
Start: 2024-01-19 | End: 2025-01-18

## 2024-01-19 NOTE — PROGRESS NOTES
HPI    Pt here today for overdue glaucoma wrkup and rev hvf.    States stopped   using gtts x 18 mos ago due to ran out and lost to follow up.    Blurred vision at distance only, near vision good.   Would like updated rx   today.    Denies any headaches, eye pain or pressure.    Hemoglobin A1C       Date                     Value               Ref Range             Status                07/17/2023               6.0 (H)             <5.7 % of tota*       Final                 01/30/2023               5.8 (H)             <5.7 % of tota*       Final                 07/18/2022               6.0 (H)             <5.7 % of tota*       Final            Pt does not check BSL regularly.    (-) allergies / dry eyes  (-) gtts  (-) floaters or light flashes      VIV ~ 1 year with Dr. Briones  Last edited by Mohan Calix, OD on 1/19/2024  9:18 AM.            Assessment /Plan     For exam results, see Encounter Report.    Primary open angle glaucoma (POAG) of left eye, moderate stage  -     timolol maleate 0.5% (TIMOPTIC) 0.5 % Drop; Place 1 drop into both eyes once daily.  Dispense: 10 mL; Refill: 1  -     Posterior Segment OCT Optic Nerve- Both eyes  -     Garner Visual Field - OU - Extended - Both Eyes    Primary open angle glaucoma (POAG) of right eye, mild stage  -     Posterior Segment OCT Optic Nerve- Both eyes  -     Garner Visual Field - OU - Extended - Both Eyes    Bilateral ocular hypertension  -     latanoprost 0.005 % ophthalmic solution; INSTILL 1 DROP INTO BOTH EYES EVERY EVENING  Dispense: 7.5 mL; Refill: 0    Nuclear sclerosis, bilateral    Cortical cataract    Strabismic amblyopia of right eye    Refractive error    Epiretinal membrane (ERM) of left eye    Choroidal nevus of left eye    Diabetes mellitus type 2 without retinopathy      1. Primary open angle glaucoma (POAG) of left eye, moderate stage  2. Primary open angle glaucoma (POAG) of right eye, mild stage  3. Bilateral ocular hypertension  TMax  21/34   / 609    Currently not on drops  IOP high today -- not at target (mid teens)    Reviewed OCT / HVF with patient today  OCT --  OD stable, no rnfl thinning  OS worsened, borderline NS/N,T, ONL TS/G/NI/TI    HVF --  OD GHT within normal limits  OS worsened, superior defect, inferior arcuate    Restart timolol qAM OU -- denies copd/chf/asthma  Restart latanoprost qPM OU    Recheck IOP in 1 month    - latanoprost 0.005 % ophthalmic solution; INSTILL 1 DROP INTO BOTH EYES EVERY EVENING  Dispense: 7.5 mL; Refill: 0  - timolol maleate 0.5% (TIMOPTIC) 0.5 % Drop; Place 1 drop into both eyes once daily.  Dispense: 10 mL; Refill: 1  - Posterior Segment OCT Optic Nerve- Both eyes  - Garner Visual Field - OU - Extended - Both Eyes    4. Nuclear sclerosis, bilateral  5. Cortical cataract  Moderate, not VS  Hx of amblyopia OD  Discussed possible ocular affects of cataracts. Acceptable BCVA OU.   Discussed treatment options. Surgery not recommended at this time. Monitor yearly.     6. Strabismic amblyopia OD  7. Refractive error  Dispensed updated spectacle Rx. Discussed various spectacle lens options. Discussed adaptation period to new specs.     8. Epiretinal membrane (ERM) of left eye  Mild, not VS  Return in 1 month for mac OCT     9. Choroidal nevus of left eye  Stable from previous  Flat, 1/4 DD   Observe     10. Diabetes mellitus type 2 without retinopathy  Discussed possible ocular affects of uncontrolled blood sugar with patient. Recommended continued strong blood sugar control and continued care with PCP. Monitor yearly.

## 2024-01-19 NOTE — PROGRESS NOTES
24-2 HVF done. OCT nerve done.         Assessment /Plan     For exam results, see Encounter Report.    There are no diagnoses linked to this encounter.

## 2024-01-22 ENCOUNTER — TELEPHONE (OUTPATIENT)
Dept: FAMILY MEDICINE | Facility: CLINIC | Age: 74
End: 2024-01-22
Payer: MEDICARE

## 2024-01-22 DIAGNOSIS — E78.5 HYPERLIPIDEMIA ASSOCIATED WITH TYPE 2 DIABETES MELLITUS: ICD-10-CM

## 2024-01-22 DIAGNOSIS — Z79.899 ENCOUNTER FOR LONG-TERM (CURRENT) USE OF OTHER MEDICATIONS: Primary | ICD-10-CM

## 2024-01-22 DIAGNOSIS — E11.9 TYPE 2 DIABETES MELLITUS WITHOUT RETINOPATHY: ICD-10-CM

## 2024-01-22 DIAGNOSIS — E11.69 HYPERLIPIDEMIA ASSOCIATED WITH TYPE 2 DIABETES MELLITUS: ICD-10-CM

## 2024-01-22 DIAGNOSIS — Z00.00 ROUTINE GENERAL MEDICAL EXAMINATION AT A HEALTH CARE FACILITY: ICD-10-CM

## 2024-01-22 NOTE — TELEPHONE ENCOUNTER
Pt here for labs.

## 2024-01-23 LAB
ALBUMIN SERPL-MCNC: 4.2 G/DL (ref 3.6–5.1)
ALBUMIN/CREAT UR: 18 MCG/MG CREAT
ALBUMIN/GLOB SERPL: 2 (CALC) (ref 1–2.5)
ALP SERPL-CCNC: 49 U/L (ref 35–144)
ALT SERPL-CCNC: 25 U/L (ref 9–46)
APPEARANCE UR: CLEAR
AST SERPL-CCNC: 19 U/L (ref 10–35)
BACTERIA #/AREA URNS HPF: NORMAL /HPF
BACTERIA UR CULT: NORMAL
BASOPHILS # BLD AUTO: 20 CELLS/UL (ref 0–200)
BASOPHILS NFR BLD AUTO: 0.4 %
BILIRUB SERPL-MCNC: 0.8 MG/DL (ref 0.2–1.2)
BILIRUB UR QL STRIP: NEGATIVE
BUN SERPL-MCNC: 14 MG/DL (ref 7–25)
BUN/CREAT SERPL: ABNORMAL (CALC) (ref 6–22)
CALCIUM SERPL-MCNC: 9.2 MG/DL (ref 8.6–10.3)
CHLORIDE SERPL-SCNC: 107 MMOL/L (ref 98–110)
CHOLEST SERPL-MCNC: 118 MG/DL
CHOLEST/HDLC SERPL: 2.9 (CALC)
CO2 SERPL-SCNC: 30 MMOL/L (ref 20–32)
COLOR UR: YELLOW
CREAT SERPL-MCNC: 0.82 MG/DL (ref 0.7–1.28)
CREAT UR-MCNC: 156 MG/DL (ref 20–320)
EGFR: 93 ML/MIN/1.73M2
EOSINOPHIL # BLD AUTO: 299 CELLS/UL (ref 15–500)
EOSINOPHIL NFR BLD AUTO: 6.1 %
ERYTHROCYTE [DISTWIDTH] IN BLOOD BY AUTOMATED COUNT: 14.4 % (ref 11–15)
GLOBULIN SER CALC-MCNC: 2.1 G/DL (CALC) (ref 1.9–3.7)
GLUCOSE SERPL-MCNC: 113 MG/DL (ref 65–99)
GLUCOSE UR QL STRIP: NEGATIVE
HBA1C MFR BLD: 6.4 % OF TOTAL HGB
HCT VFR BLD AUTO: 38.6 % (ref 38.5–50)
HDLC SERPL-MCNC: 41 MG/DL
HGB BLD-MCNC: 12.7 G/DL (ref 13.2–17.1)
HGB UR QL STRIP: NEGATIVE
HYALINE CASTS #/AREA URNS LPF: NORMAL /LPF
KETONES UR QL STRIP: NEGATIVE
LDLC SERPL CALC-MCNC: 61 MG/DL (CALC)
LEUKOCYTE ESTERASE UR QL STRIP: NEGATIVE
LYMPHOCYTES # BLD AUTO: 1671 CELLS/UL (ref 850–3900)
LYMPHOCYTES NFR BLD AUTO: 34.1 %
MCH RBC QN AUTO: 29.5 PG (ref 27–33)
MCHC RBC AUTO-ENTMCNC: 32.9 G/DL (ref 32–36)
MCV RBC AUTO: 89.8 FL (ref 80–100)
MICROALBUMIN UR-MCNC: 2.8 MG/DL
MONOCYTES # BLD AUTO: 461 CELLS/UL (ref 200–950)
MONOCYTES NFR BLD AUTO: 9.4 %
NEUTROPHILS # BLD AUTO: 2450 CELLS/UL (ref 1500–7800)
NEUTROPHILS NFR BLD AUTO: 50 %
NITRITE UR QL STRIP: NEGATIVE
NONHDLC SERPL-MCNC: 77 MG/DL (CALC)
PH UR STRIP: 5.5 [PH] (ref 5–8)
PLATELET # BLD AUTO: 229 THOUSAND/UL (ref 140–400)
PMV BLD REES-ECKER: 10.4 FL (ref 7.5–12.5)
POTASSIUM SERPL-SCNC: 5.4 MMOL/L (ref 3.5–5.3)
PROT SERPL-MCNC: 6.3 G/DL (ref 6.1–8.1)
PROT UR QL STRIP: NEGATIVE
RBC # BLD AUTO: 4.3 MILLION/UL (ref 4.2–5.8)
RBC #/AREA URNS HPF: NORMAL /HPF
SERVICE CMNT-IMP: NORMAL
SODIUM SERPL-SCNC: 142 MMOL/L (ref 135–146)
SP GR UR STRIP: 1.02 (ref 1–1.03)
SQUAMOUS #/AREA URNS HPF: NORMAL /HPF
TRIGL SERPL-MCNC: 79 MG/DL
TSH SERPL-ACNC: 1.67 MIU/L (ref 0.4–4.5)
WBC # BLD AUTO: 4.9 THOUSAND/UL (ref 3.8–10.8)
WBC #/AREA URNS HPF: NORMAL /HPF

## 2024-01-24 ENCOUNTER — TELEPHONE (OUTPATIENT)
Dept: FAMILY MEDICINE | Facility: CLINIC | Age: 74
End: 2024-01-24
Payer: MEDICARE

## 2024-01-24 NOTE — TELEPHONE ENCOUNTER
"Per Dr. Lemus, "Okay to decrease to 4 sweetener a day." Spoke with patient and wife and verbalized understanding.       "

## 2024-01-24 NOTE — TELEPHONE ENCOUNTER
----- Message from Denise Mckee LPN sent at 1/24/2024  8:26 AM CST -----  Potassium- 5.4- Dr. Lemus lab

## 2024-01-24 NOTE — TELEPHONE ENCOUNTER
Spoke with patient and states he is using a lot of sweetener about 8 packets a day in his coffee.

## 2024-01-25 NOTE — PROGRESS NOTES
"Subjective:      Patient ID: Kyrie Norton Jr. is a 73 y.o. male.    Chief Complaint: Disease Management    HPI    Rheumatologic History:   - Diagnosis/es:              - seronegative rheumatoid arthritis  - Positive serologies: -  - Negative serologies: RF, CCP  - Infectious screening labs:  Hepatitis-B, C (09/2022); positive TB gold  (09/2022)  - Imaging:              - DEXA scan (8/2022) Normal BMD              - Xray arthritis survey (9/2022) no obvious erosive changes  - Previous Treatments:              - Prednisone              - HCQ 400mg daily: Ineffective  - Current Treatments:               - MTX 17.5mg weekly plus folic acid daily   - Humira biweekly (6/2023- )  - Plaquenil eye exam: No toxicity (4/2022)     Interval History:  He is doing well and reports only mild occasional hand cramping and gelling. He denies overt pain and joint swelling. He has not had issues with his medications.     Objective:   /75   Pulse 69   Ht 5' 7" (1.702 m)   Wt 73 kg (160 lb 15 oz)   BMI 25.21 kg/m²   Physical Exam   Constitutional: normal appearance.   HENT:   Head: Normocephalic and atraumatic.   Cardiovascular: Normal rate, regular rhythm and normal heart sounds.   Pulmonary/Chest: Effort normal and breath sounds normal.   Musculoskeletal:      Comments: + Heberden's and Letha's nodes  No synovitis, dactylitis, enthesitis, effusions     Neurological: He is alert.   Skin: Skin is warm and dry. No rash noted.   No skin thickening, telangiectasias, calcinosis, psoriasiform lesions, lupoid lesions          6/5/2023 9/8/2023 1/26/2024   Tender (JIMENEZ-28) 4 / 28  0 / 28  0 / 28    Swollen (JIMENEZ-28) 0 / 28  0 / 28  0 / 28    Provider Global -- 20 mm 10 mm   Patient Global 50 mm 20 mm 10 mm   ESR 5 mm/hr 0 mm/hr 2 mm/hr   CRP 1.3 mg/L -- 0.4 mg/L   JIMENEZ-28 (ESR) 2.95 (Low disease activity) -- 0.63 (Remission)   JIMENEZ-28 (CRP) 3.08 (Low disease activity) -- 1.22 (Remission)   CDAI Score -- 4  2      Labs independently " reviewed by me (1/18/2024)  CBC HGB 12.7, WBC, PLT WNL   CMP CR LFTs WNL   ESR WNL  CRP WNL     Assessment:     1. Rheumatoid arthritis involving multiple sites with positive rheumatoid factor    2. Primary osteoarthritis involving multiple joints    3. Drug-induced immunodeficiency    4. High risk medication use      This is a 73-year-old man with past medical history of HLD, T2DM, positive quantiferon with borderline T-spot but no symptoms s/p prophylactic therapy, and seronegative rheumatoid arthritis on methotrexate 17.5 mg weekly, and Humira biweekly. He is in remission. Continue current medications.     Plan:     Problem List Items Addressed This Visit          Immunology/Multi System    Rheumatoid arthritis - Primary     Other Visit Diagnoses       Primary osteoarthritis involving multiple joints        Drug-induced immunodeficiency        High risk medication use              - Humira biweekly  - MTX 17.5mg weekly plus folic acid daily   - Robaxin PRN for cramps  - CBC, CMP, ESR, CRP every 12 weeks  - Pre-DMARD labs due for repeat   - DEXA due for repeat 8/2024  - Immunizations: Zoster live (2015), flu (1/22), PCV13 (2020), PCV20 (9/2022)    Follow up in 6 months    30 minutes of total time spent on the encounter, which includes face to face time and non-face to face time preparing to see the patient (eg, review of tests), Obtaining and/or reviewing separately obtained history, Documenting clinical information in the electronic or other health record, Independently interpreting results (not separately reported) and communicating results to the patient/family/caregiver, or Care coordination (not separately reported).     This note was prepared with [employer] voice recognition transcription software. Garbled syntax, mangled pronouns, and other bizarre constructions may be attributed to that software system       Nitza Ruiz M.D.  Rheumatology Dept  [place], [place]

## 2024-01-26 ENCOUNTER — OFFICE VISIT (OUTPATIENT)
Dept: RHEUMATOLOGY | Facility: CLINIC | Age: 74
End: 2024-01-26
Payer: MEDICARE

## 2024-01-26 VITALS
BODY MASS INDEX: 25.26 KG/M2 | HEIGHT: 67 IN | DIASTOLIC BLOOD PRESSURE: 75 MMHG | WEIGHT: 160.94 LBS | SYSTOLIC BLOOD PRESSURE: 138 MMHG | HEART RATE: 69 BPM

## 2024-01-26 DIAGNOSIS — Z79.899 HIGH RISK MEDICATION USE: ICD-10-CM

## 2024-01-26 DIAGNOSIS — M05.79 RHEUMATOID ARTHRITIS INVOLVING MULTIPLE SITES WITH POSITIVE RHEUMATOID FACTOR: Primary | ICD-10-CM

## 2024-01-26 DIAGNOSIS — M15.9 PRIMARY OSTEOARTHRITIS INVOLVING MULTIPLE JOINTS: ICD-10-CM

## 2024-01-26 DIAGNOSIS — Z79.631 METHOTREXATE, LONG TERM, CURRENT USE: ICD-10-CM

## 2024-01-26 DIAGNOSIS — D84.821 DRUG-INDUCED IMMUNODEFICIENCY: ICD-10-CM

## 2024-01-26 DIAGNOSIS — R25.2 CRAMPING OF HANDS: ICD-10-CM

## 2024-01-26 DIAGNOSIS — Z79.899 DRUG-INDUCED IMMUNODEFICIENCY: ICD-10-CM

## 2024-01-26 PROCEDURE — 1160F RVW MEDS BY RX/DR IN RCRD: CPT | Mod: CPTII,S$GLB,, | Performed by: STUDENT IN AN ORGANIZED HEALTH CARE EDUCATION/TRAINING PROGRAM

## 2024-01-26 PROCEDURE — 3044F HG A1C LEVEL LT 7.0%: CPT | Mod: CPTII,S$GLB,, | Performed by: STUDENT IN AN ORGANIZED HEALTH CARE EDUCATION/TRAINING PROGRAM

## 2024-01-26 PROCEDURE — 3075F SYST BP GE 130 - 139MM HG: CPT | Mod: CPTII,S$GLB,, | Performed by: STUDENT IN AN ORGANIZED HEALTH CARE EDUCATION/TRAINING PROGRAM

## 2024-01-26 PROCEDURE — 1101F PT FALLS ASSESS-DOCD LE1/YR: CPT | Mod: CPTII,S$GLB,, | Performed by: STUDENT IN AN ORGANIZED HEALTH CARE EDUCATION/TRAINING PROGRAM

## 2024-01-26 PROCEDURE — 3288F FALL RISK ASSESSMENT DOCD: CPT | Mod: CPTII,S$GLB,, | Performed by: STUDENT IN AN ORGANIZED HEALTH CARE EDUCATION/TRAINING PROGRAM

## 2024-01-26 PROCEDURE — 99215 OFFICE O/P EST HI 40 MIN: CPT | Mod: S$GLB,,, | Performed by: STUDENT IN AN ORGANIZED HEALTH CARE EDUCATION/TRAINING PROGRAM

## 2024-01-26 PROCEDURE — 3078F DIAST BP <80 MM HG: CPT | Mod: CPTII,S$GLB,, | Performed by: STUDENT IN AN ORGANIZED HEALTH CARE EDUCATION/TRAINING PROGRAM

## 2024-01-26 PROCEDURE — 1159F MED LIST DOCD IN RCRD: CPT | Mod: CPTII,S$GLB,, | Performed by: STUDENT IN AN ORGANIZED HEALTH CARE EDUCATION/TRAINING PROGRAM

## 2024-01-26 PROCEDURE — 3061F NEG MICROALBUMINURIA REV: CPT | Mod: CPTII,S$GLB,, | Performed by: STUDENT IN AN ORGANIZED HEALTH CARE EDUCATION/TRAINING PROGRAM

## 2024-01-26 PROCEDURE — 1125F AMNT PAIN NOTED PAIN PRSNT: CPT | Mod: CPTII,S$GLB,, | Performed by: STUDENT IN AN ORGANIZED HEALTH CARE EDUCATION/TRAINING PROGRAM

## 2024-01-26 PROCEDURE — 99999 PR PBB SHADOW E&M-EST. PATIENT-LVL IV: CPT | Mod: PBBFAC,,, | Performed by: STUDENT IN AN ORGANIZED HEALTH CARE EDUCATION/TRAINING PROGRAM

## 2024-01-26 PROCEDURE — 3008F BODY MASS INDEX DOCD: CPT | Mod: CPTII,S$GLB,, | Performed by: STUDENT IN AN ORGANIZED HEALTH CARE EDUCATION/TRAINING PROGRAM

## 2024-01-26 PROCEDURE — 3066F NEPHROPATHY DOC TX: CPT | Mod: CPTII,S$GLB,, | Performed by: STUDENT IN AN ORGANIZED HEALTH CARE EDUCATION/TRAINING PROGRAM

## 2024-01-26 RX ORDER — METHOTREXATE 2.5 MG/1
17.5 TABLET ORAL
Qty: 84 TABLET | Refills: 1 | Status: SHIPPED | OUTPATIENT
Start: 2024-01-26 | End: 2024-07-24

## 2024-01-26 RX ORDER — METHOCARBAMOL 750 MG/1
750 TABLET, FILM COATED ORAL 3 TIMES DAILY PRN
Qty: 90 TABLET | Refills: 1 | Status: SHIPPED | OUTPATIENT
Start: 2024-01-26

## 2024-01-26 RX ORDER — FOLIC ACID 1 MG/1
1000 TABLET ORAL DAILY
Qty: 90 TABLET | Refills: 3 | Status: SHIPPED | OUTPATIENT
Start: 2024-01-26 | End: 2025-01-25

## 2024-01-26 ASSESSMENT — ROUTINE ASSESSMENT OF PATIENT INDEX DATA (RAPID3)
MDHAQ FUNCTION SCORE: 0
FATIGUE SCORE: 1.1
PSYCHOLOGICAL DISTRESS SCORE: 0
PAIN SCORE: 2
TOTAL RAPID3 SCORE: 1.33
PATIENT GLOBAL ASSESSMENT SCORE: 2

## 2024-01-30 ENCOUNTER — OFFICE VISIT (OUTPATIENT)
Dept: FAMILY MEDICINE | Facility: CLINIC | Age: 74
End: 2024-01-30
Payer: MEDICARE

## 2024-01-30 VITALS
WEIGHT: 163 LBS | HEIGHT: 67 IN | BODY MASS INDEX: 25.58 KG/M2 | HEART RATE: 74 BPM | DIASTOLIC BLOOD PRESSURE: 62 MMHG | SYSTOLIC BLOOD PRESSURE: 124 MMHG

## 2024-01-30 DIAGNOSIS — E78.5 HYPERLIPIDEMIA ASSOCIATED WITH TYPE 2 DIABETES MELLITUS: ICD-10-CM

## 2024-01-30 DIAGNOSIS — E11.9 TYPE 2 DIABETES MELLITUS WITHOUT RETINOPATHY: Primary | ICD-10-CM

## 2024-01-30 DIAGNOSIS — E11.69 HYPERLIPIDEMIA ASSOCIATED WITH TYPE 2 DIABETES MELLITUS: ICD-10-CM

## 2024-01-30 DIAGNOSIS — M06.9 RHEUMATOID ARTHRITIS, INVOLVING UNSPECIFIED SITE, UNSPECIFIED WHETHER RHEUMATOID FACTOR PRESENT: ICD-10-CM

## 2024-01-30 PROCEDURE — 3044F HG A1C LEVEL LT 7.0%: CPT | Mod: CPTII,S$GLB,, | Performed by: PHYSICIAN ASSISTANT

## 2024-01-30 PROCEDURE — 1159F MED LIST DOCD IN RCRD: CPT | Mod: CPTII,S$GLB,, | Performed by: PHYSICIAN ASSISTANT

## 2024-01-30 PROCEDURE — 3066F NEPHROPATHY DOC TX: CPT | Mod: CPTII,S$GLB,, | Performed by: PHYSICIAN ASSISTANT

## 2024-01-30 PROCEDURE — 3061F NEG MICROALBUMINURIA REV: CPT | Mod: CPTII,S$GLB,, | Performed by: PHYSICIAN ASSISTANT

## 2024-01-30 PROCEDURE — 3288F FALL RISK ASSESSMENT DOCD: CPT | Mod: CPTII,S$GLB,, | Performed by: PHYSICIAN ASSISTANT

## 2024-01-30 PROCEDURE — 3074F SYST BP LT 130 MM HG: CPT | Mod: CPTII,S$GLB,, | Performed by: PHYSICIAN ASSISTANT

## 2024-01-30 PROCEDURE — 99214 OFFICE O/P EST MOD 30 MIN: CPT | Mod: S$GLB,,, | Performed by: PHYSICIAN ASSISTANT

## 2024-01-30 PROCEDURE — 3008F BODY MASS INDEX DOCD: CPT | Mod: CPTII,S$GLB,, | Performed by: PHYSICIAN ASSISTANT

## 2024-01-30 PROCEDURE — 3078F DIAST BP <80 MM HG: CPT | Mod: CPTII,S$GLB,, | Performed by: PHYSICIAN ASSISTANT

## 2024-01-30 PROCEDURE — 1101F PT FALLS ASSESS-DOCD LE1/YR: CPT | Mod: CPTII,S$GLB,, | Performed by: PHYSICIAN ASSISTANT

## 2024-01-30 RX ORDER — ATORVASTATIN CALCIUM 40 MG/1
40 TABLET, FILM COATED ORAL DAILY
Qty: 90 TABLET | Refills: 3 | Status: SHIPPED | OUTPATIENT
Start: 2024-01-30 | End: 2025-01-29

## 2024-01-30 RX ORDER — GLIPIZIDE AND METFORMIN HCL 5; 500 MG/1; MG/1
TABLET, FILM COATED ORAL
Qty: 270 TABLET | Refills: 1 | Status: SHIPPED | OUTPATIENT
Start: 2024-01-30

## 2024-01-30 NOTE — PROGRESS NOTES
SUBJECTIVE:    Patient ID: Kyrie Norton Jr. is a 73 y.o. male.    Chief Complaint: Diabetes (Review Lab-results, declined flu vaccine, no complaints, foot exam ordered, need refills,abc )    This is a 74 yo male who presents for regular checkup and refills. Well managed with regard to diabetes. A1c at 6.4%. all other labs look stable at this time. Pt report that he is doing well without complaint. Just had visit with rheumatology and doing fine. On humira biweekly.     Diabetes  Pertinent negatives for hypoglycemia include no dizziness or headaches. Pertinent negatives for diabetes include no chest pain, no fatigue, no polyuria and no weakness.       Telephone on 01/22/2024   Component Date Value Ref Range Status    WBC 01/22/2024 4.9  3.8 - 10.8 Thousand/uL Final    RBC 01/22/2024 4.30  4.20 - 5.80 Million/uL Final    Hemoglobin 01/22/2024 12.7 (L)  13.2 - 17.1 g/dL Final    Hematocrit 01/22/2024 38.6  38.5 - 50.0 % Final    MCV 01/22/2024 89.8  80.0 - 100.0 fL Final    MCH 01/22/2024 29.5  27.0 - 33.0 pg Final    MCHC 01/22/2024 32.9  32.0 - 36.0 g/dL Final    RDW 01/22/2024 14.4  11.0 - 15.0 % Final    Platelets 01/22/2024 229  140 - 400 Thousand/uL Final    MPV 01/22/2024 10.4  7.5 - 12.5 fL Final    Neutrophils, Abs 01/22/2024 2,450  1,500 - 7,800 cells/uL Final    Lymph # 01/22/2024 1,671  850 - 3,900 cells/uL Final    Mono # 01/22/2024 461  200 - 950 cells/uL Final    Eos # 01/22/2024 299  15 - 500 cells/uL Final    Baso # 01/22/2024 20  0 - 200 cells/uL Final    Neutrophils Relative 01/22/2024 50  % Final    Lymph % 01/22/2024 34.1  % Final    Mono % 01/22/2024 9.4  % Final    Eosinophil % 01/22/2024 6.1  % Final    Basophil % 01/22/2024 0.4  % Final    Glucose 01/22/2024 113 (H)  65 - 99 mg/dL Final    BUN 01/22/2024 14  7 - 25 mg/dL Final    Creatinine 01/22/2024 0.82  0.70 - 1.28 mg/dL Final    eGFR 01/22/2024 93  > OR = 60 mL/min/1.73m2 Final    BUN/Creatinine Ratio 01/22/2024 SEE NOTE:  6 - 22 (calc)  Final    Sodium 01/22/2024 142  135 - 146 mmol/L Final    Potassium 01/22/2024 5.4 (H)  3.5 - 5.3 mmol/L Final    Chloride 01/22/2024 107  98 - 110 mmol/L Final    CO2 01/22/2024 30  20 - 32 mmol/L Final    Calcium 01/22/2024 9.2  8.6 - 10.3 mg/dL Final    Total Protein 01/22/2024 6.3  6.1 - 8.1 g/dL Final    Albumin 01/22/2024 4.2  3.6 - 5.1 g/dL Final    Globulin, Total 01/22/2024 2.1  1.9 - 3.7 g/dL (calc) Final    Albumin/Globulin Ratio 01/22/2024 2.0  1.0 - 2.5 (calc) Final    Total Bilirubin 01/22/2024 0.8  0.2 - 1.2 mg/dL Final    Alkaline Phosphatase 01/22/2024 49  35 - 144 U/L Final    AST 01/22/2024 19  10 - 35 U/L Final    ALT 01/22/2024 25  9 - 46 U/L Final    Hemoglobin A1C 01/22/2024 6.4 (H)  <5.7 % of total Hgb Final    Cholesterol 01/22/2024 118  <200 mg/dL Final    HDL 01/22/2024 41  > OR = 40 mg/dL Final    Triglycerides 01/22/2024 79  <150 mg/dL Final    LDL Cholesterol 01/22/2024 61  mg/dL (calc) Final    HDL/Cholesterol Ratio 01/22/2024 2.9  <5.0 (calc) Final    Non HDL Chol. (LDL+VLDL) 01/22/2024 77  <130 mg/dL (calc) Final    Creatinine, Urine 01/22/2024 156  20 - 320 mg/dL Final    Microalb, Ur 01/22/2024 2.8  See Note: mg/dL Final    Microalb/Creat Ratio 01/22/2024 18  <30 mcg/mg creat Final    TSH w/reflex to FT4 01/22/2024 1.67  0.40 - 4.50 mIU/L Final    Color, UA 01/22/2024 YELLOW  YELLOW Final    Appearance, UA 01/22/2024 CLEAR  CLEAR Final    Specific Gravity, UA 01/22/2024 1.024  1.001 - 1.035 Final    pH, UA 01/22/2024 5.5  5.0 - 8.0 Final    Glucose, UA 01/22/2024 NEGATIVE  NEGATIVE Final    Bilirubin, UA 01/22/2024 NEGATIVE  NEGATIVE Final    Ketones, UA 01/22/2024 NEGATIVE  NEGATIVE Final    Occult Blood UA 01/22/2024 NEGATIVE  NEGATIVE Final    Protein, UA 01/22/2024 NEGATIVE  NEGATIVE Final    Nitrite, UA 01/22/2024 NEGATIVE  NEGATIVE Final    Leukocytes, UA 01/22/2024 NEGATIVE  NEGATIVE Final    WBC Casts, UA 01/22/2024 NONE SEEN  < OR = 5 /HPF Final    RBC Casts, UA 01/22/2024  NONE SEEN  < OR = 2 /HPF Final    Squam Epithel, UA 01/22/2024 NONE SEEN  < OR = 5 /HPF Final    Bacteria, UA 01/22/2024 NONE SEEN  NONE SEEN /HPF Final    Hyaline Casts, UA 01/22/2024 NONE SEEN  NONE SEEN /LPF Final    Service Cmt: 01/22/2024    Final    Reflexive Urine Culture 01/22/2024    Final   Lab Visit on 01/18/2024   Component Date Value Ref Range Status    WBC 01/18/2024 4.50  3.90 - 12.70 K/uL Final    RBC 01/18/2024 4.56 (L)  4.60 - 6.20 M/uL Final    Hemoglobin 01/18/2024 13.5 (L)  14.0 - 18.0 g/dL Final    Hematocrit 01/18/2024 42.0  40.0 - 54.0 % Final    MCV 01/18/2024 92  82 - 98 fL Final    MCH 01/18/2024 29.6  27.0 - 31.0 pg Final    MCHC 01/18/2024 32.1  32.0 - 36.0 g/dL Final    RDW 01/18/2024 14.1  11.5 - 14.5 % Final    Platelets 01/18/2024 239  150 - 450 K/uL Final    MPV 01/18/2024 10.4  9.2 - 12.9 fL Final    Immature Granulocytes 01/18/2024 0.2  0.0 - 0.5 % Final    Gran # (ANC) 01/18/2024 2.2  1.8 - 7.7 K/uL Final    Immature Grans (Abs) 01/18/2024 0.01  0.00 - 0.04 K/uL Final    Lymph # 01/18/2024 1.7  1.0 - 4.8 K/uL Final    Mono # 01/18/2024 0.3  0.3 - 1.0 K/uL Final    Eos # 01/18/2024 0.3  0.0 - 0.5 K/uL Final    Baso # 01/18/2024 0.02  0.00 - 0.20 K/uL Final    nRBC 01/18/2024 0  0 /100 WBC Final    Gran % 01/18/2024 48.4  38.0 - 73.0 % Final    Lymph % 01/18/2024 36.7  18.0 - 48.0 % Final    Mono % 01/18/2024 7.6  4.0 - 15.0 % Final    Eosinophil % 01/18/2024 6.7  0.0 - 8.0 % Final    Basophil % 01/18/2024 0.4  0.0 - 1.9 % Final    Differential Method 01/18/2024 Automated   Final    Sodium 01/18/2024 141  136 - 145 mmol/L Final    Potassium 01/18/2024 4.2  3.5 - 5.1 mmol/L Final    Chloride 01/18/2024 106  95 - 110 mmol/L Final    CO2 01/18/2024 28  23 - 29 mmol/L Final    Glucose 01/18/2024 90  70 - 110 mg/dL Final    BUN 01/18/2024 10  8 - 23 mg/dL Final    Creatinine 01/18/2024 0.8  0.5 - 1.4 mg/dL Final    Calcium 01/18/2024 9.6  8.7 - 10.5 mg/dL Final    Total Protein  01/18/2024 6.8  6.0 - 8.4 g/dL Final    Albumin 01/18/2024 4.0  3.5 - 5.2 g/dL Final    Total Bilirubin 01/18/2024 1.4 (H)  0.1 - 1.0 mg/dL Final    Alkaline Phosphatase 01/18/2024 52 (L)  55 - 135 U/L Final    AST 01/18/2024 20  10 - 40 U/L Final    ALT 01/18/2024 21  10 - 44 U/L Final    eGFR 01/18/2024 >60.0  >60 mL/min/1.73 m^2 Final    Anion Gap 01/18/2024 7 (L)  8 - 16 mmol/L Final    Sed Rate 01/18/2024 2  0 - 10 mm/Hr Final    CRP 01/18/2024 0.4  0.0 - 8.2 mg/L Final   Patient Outreach on 10/27/2023   Component Date Value Ref Range Status    Left Eye DM Retinopathy 10/25/2023 Negative   Final    Right Eye DM Retinopathy 10/25/2023 Negative   Final   Lab Visit on 09/01/2023   Component Date Value Ref Range Status    WBC 09/01/2023 5.98  3.90 - 12.70 K/uL Final    RBC 09/01/2023 4.49 (L)  4.60 - 6.20 M/uL Final    Hemoglobin 09/01/2023 12.4 (L)  14.0 - 18.0 g/dL Final    Hematocrit 09/01/2023 39.5 (L)  40.0 - 54.0 % Final    MCV 09/01/2023 88  82 - 98 fL Final    MCH 09/01/2023 27.6  27.0 - 31.0 pg Final    MCHC 09/01/2023 31.4 (L)  32.0 - 36.0 g/dL Final    RDW 09/01/2023 14.6 (H)  11.5 - 14.5 % Final    Platelets 09/01/2023 259  150 - 450 K/uL Final    MPV 09/01/2023 9.9  9.2 - 12.9 fL Final    Immature Granulocytes 09/01/2023 0.2  0.0 - 0.5 % Final    Gran # (ANC) 09/01/2023 4.0  1.8 - 7.7 K/uL Final    Immature Grans (Abs) 09/01/2023 0.01  0.00 - 0.04 K/uL Final    Lymph # 09/01/2023 1.4  1.0 - 4.8 K/uL Final    Mono # 09/01/2023 0.3  0.3 - 1.0 K/uL Final    Eos # 09/01/2023 0.2  0.0 - 0.5 K/uL Final    Baso # 09/01/2023 0.02  0.00 - 0.20 K/uL Final    nRBC 09/01/2023 0  0 /100 WBC Final    Gran % 09/01/2023 67.4  38.0 - 73.0 % Final    Lymph % 09/01/2023 23.9  18.0 - 48.0 % Final    Mono % 09/01/2023 5.7  4.0 - 15.0 % Final    Eosinophil % 09/01/2023 2.5  0.0 - 8.0 % Final    Basophil % 09/01/2023 0.3  0.0 - 1.9 % Final    Differential Method 09/01/2023 Automated   Final    Sodium 09/01/2023 140  136 -  145 mmol/L Final    Potassium 09/01/2023 4.6  3.5 - 5.1 mmol/L Final    Chloride 09/01/2023 107  95 - 110 mmol/L Final    CO2 09/01/2023 27  23 - 29 mmol/L Final    Glucose 09/01/2023 71  70 - 110 mg/dL Final    BUN 09/01/2023 9  8 - 23 mg/dL Final    Creatinine 09/01/2023 0.9  0.5 - 1.4 mg/dL Final    Calcium 09/01/2023 9.2  8.7 - 10.5 mg/dL Final    Total Protein 09/01/2023 6.7  6.0 - 8.4 g/dL Final    Albumin 09/01/2023 4.0  3.5 - 5.2 g/dL Final    Total Bilirubin 09/01/2023 0.8  0.1 - 1.0 mg/dL Final    Alkaline Phosphatase 09/01/2023 42 (L)  55 - 135 U/L Final    AST 09/01/2023 19  10 - 40 U/L Final    ALT 09/01/2023 19  10 - 44 U/L Final    eGFR 09/01/2023 >60.0  >60 mL/min/1.73 m^2 Final    Anion Gap 09/01/2023 6 (L)  8 - 16 mmol/L Final    Sed Rate 09/01/2023 0  0 - 10 mm/Hr Final    CRP 09/01/2023 <0.3  0.0 - 8.2 mg/L Final       Past Medical History:   Diagnosis Date    Diabetes mellitus type II     Hyperlipidemia     Hypertension      No past surgical history on file.  Family History   Problem Relation Age of Onset    Blindness Neg Hx     Glaucoma Neg Hx     Macular degeneration Neg Hx        Marital Status:   Alcohol History:  reports current alcohol use.  Tobacco History:  reports that he has never smoked. He has never used smokeless tobacco.  Drug History:  reports no history of drug use.    Review of patient's allergies indicates:   Allergen Reactions    Parafon forte Nausea Only       Current Outpatient Medications:     adalimumab (HUMIRA,CF, PEN) 40 mg/0.4 mL PnKt, Inject 0.4 mLs (40 mg total) into the skin every 14 (fourteen) days., Disp: 6 pen , Rfl: 1    aspirin (ECOTRIN) 81 MG EC tablet, Take 81 mg by mouth once daily., Disp: , Rfl:     blood sugar diagnostic (ACCU-CHEK CJ PLUS TEST STRP) Strp, 1 strip by Subdermal route Daily., Disp: 100 strip, Rfl: 4    clotrimazole-betamethasone 1-0.05% (LOTRISONE) cream, Apply topically 2 (two) times daily., Disp: 15 g, Rfl: 1    coenzyme Q10 10 mg  capsule, Take 10 mg by mouth once daily., Disp: , Rfl:     fish oil-omega-3 fatty acids 300-1,000 mg capsule, Take 2 g by mouth once daily., Disp: , Rfl:     folic acid (FOLVITE) 1 MG tablet, Take 1 tablet (1,000 mcg total) by mouth once daily., Disp: 90 tablet, Rfl: 3    lancets (ACCU-CHEK FASTCLIX) Misc, 1 Device by Misc.(Non-Drug; Combo Route) route Daily., Disp: 100 each, Rfl: 4    latanoprost 0.005 % ophthalmic solution, INSTILL 1 DROP INTO BOTH EYES EVERY EVENING, Disp: 7.5 mL, Rfl: 0    methocarbamoL (ROBAXIN) 750 MG Tab, Take 1 tablet (750 mg total) by mouth 3 (three) times daily as needed (Muscle cramps)., Disp: 90 tablet, Rfl: 1    methotrexate 2.5 MG Tab, Take 7 tablets (17.5 mg total) by mouth every 7 days., Disp: 84 tablet, Rfl: 1    MILK THISTLE ORAL, Take 250 mg by mouth once daily. , Disp: , Rfl:     timolol maleate 0.5% (TIMOPTIC) 0.5 % Drop, Place 1 drop into both eyes once daily., Disp: 10 mL, Rfl: 1    triamcinolone acetonide 0.1% (KENALOG) 0.1 % paste, SMARTSIG:Sparingly Topical 3 Times Daily, Disp: , Rfl:     atorvastatin (LIPITOR) 40 MG tablet, Take 1 tablet (40 mg total) by mouth once daily., Disp: 90 tablet, Rfl: 3    glipizide-metformin (METAGLIP) 5-500 mg per tablet, 1 tablet in the am, 2 tablets in the pm., Disp: 270 tablet, Rfl: 1    Review of Systems   Constitutional:  Negative for activity change, fatigue, fever and unexpected weight change.   HENT:  Negative for congestion.    Respiratory:  Negative for apnea, cough, chest tightness and shortness of breath.    Cardiovascular:  Negative for chest pain and palpitations.   Gastrointestinal:  Negative for abdominal distention and abdominal pain.   Endocrine: Negative for polyuria.   Genitourinary:  Negative for difficulty urinating and dysuria.   Musculoskeletal:  Negative for arthralgias and back pain.   Neurological:  Negative for dizziness, weakness, light-headedness and headaches.          Objective:      Vitals:    01/30/24 0707  "  BP: 124/62   Pulse: 74   Weight: 73.9 kg (163 lb)   Height: 5' 7" (1.702 m)     Physical Exam  Constitutional:       General: He is not in acute distress.     Appearance: He is well-developed.   HENT:      Head: Normocephalic and atraumatic.   Eyes:      Pupils: Pupils are equal, round, and reactive to light.   Neck:      Thyroid: No thyromegaly.   Cardiovascular:      Rate and Rhythm: Normal rate and regular rhythm.      Pulses: Normal pulses.           Dorsalis pedis pulses are 2+ on the right side and 2+ on the left side.        Posterior tibial pulses are 2+ on the right side and 2+ on the left side.      Heart sounds: Normal heart sounds.   Pulmonary:      Effort: Pulmonary effort is normal.      Breath sounds: Normal breath sounds.   Abdominal:      General: Bowel sounds are normal. There is no distension.      Palpations: Abdomen is soft.      Tenderness: There is no abdominal tenderness.   Musculoskeletal:         General: Normal range of motion.      Cervical back: Normal range of motion and neck supple.   Feet:      Right foot:      Protective Sensation: 2 sites tested.  2 sites sensed.      Skin integrity: Dry skin present. No ulcer or blister.      Left foot:      Protective Sensation: 2 sites tested.  2 sites sensed.      Skin integrity: Dry skin present. No ulcer or blister.   Skin:     General: Skin is warm and dry.      Capillary Refill: Capillary refill takes less than 2 seconds.      Findings: Rash present. No erythema.      Comments: Annular lesions. Thickened borders, some with central clearing and crusting present   Neurological:      Mental Status: He is alert and oriented to person, place, and time.      Cranial Nerves: No cranial nerve deficit.           Assessment:       1. Type 2 diabetes mellitus without retinopathy    2. Hyperlipidemia associated with type 2 diabetes mellitus    3. Rheumatoid arthritis, involving unspecified site, unspecified whether rheumatoid factor present       "   Plan:       Type 2 diabetes mellitus without retinopathy  Comments:  A1c at 6.4% and well managed at this time. will continue as is.  Orders:  -     Foot Exam Performed  -     glipizide-metformin (METAGLIP) 5-500 mg per tablet; 1 tablet in the am, 2 tablets in the pm.  Dispense: 270 tablet; Refill: 1    Hyperlipidemia associated with type 2 diabetes mellitus  Comments:  Labs reviewed and remain stable, refills today. NO changes from pcp standpoint.  Orders:  -     atorvastatin (LIPITOR) 40 MG tablet; Take 1 tablet (40 mg total) by mouth once daily.  Dispense: 90 tablet; Refill: 3    Rheumatoid arthritis, involving unspecified site, unspecified whether rheumatoid factor present  Comments:  stable and followed by rheumatology. on humira biweekly.      Follow up in about 6 months (around 7/30/2024) for Diabetic Check-Up.        1/30/2024 Marco Antonio Loyola PA-C

## 2024-02-20 ENCOUNTER — OFFICE VISIT (OUTPATIENT)
Dept: OPTOMETRY | Facility: CLINIC | Age: 74
End: 2024-02-20
Payer: MEDICARE

## 2024-02-20 DIAGNOSIS — H40.1122 PRIMARY OPEN ANGLE GLAUCOMA (POAG) OF LEFT EYE, MODERATE STAGE: Primary | ICD-10-CM

## 2024-02-20 DIAGNOSIS — H40.1111 PRIMARY OPEN ANGLE GLAUCOMA (POAG) OF RIGHT EYE, MILD STAGE: ICD-10-CM

## 2024-02-20 DIAGNOSIS — H35.372 EPIRETINAL MEMBRANE (ERM) OF LEFT EYE: ICD-10-CM

## 2024-02-20 PROCEDURE — 1101F PT FALLS ASSESS-DOCD LE1/YR: CPT | Mod: CPTII,S$GLB,, | Performed by: OPTOMETRIST

## 2024-02-20 PROCEDURE — 1159F MED LIST DOCD IN RCRD: CPT | Mod: CPTII,S$GLB,, | Performed by: OPTOMETRIST

## 2024-02-20 PROCEDURE — 99999 PR PBB SHADOW E&M-EST. PATIENT-LVL III: CPT | Mod: PBBFAC,,, | Performed by: OPTOMETRIST

## 2024-02-20 PROCEDURE — 3044F HG A1C LEVEL LT 7.0%: CPT | Mod: CPTII,S$GLB,, | Performed by: OPTOMETRIST

## 2024-02-20 PROCEDURE — 2023F DILAT RTA XM W/O RTNOPTHY: CPT | Mod: CPTII,S$GLB,, | Performed by: OPTOMETRIST

## 2024-02-20 PROCEDURE — 3066F NEPHROPATHY DOC TX: CPT | Mod: CPTII,S$GLB,, | Performed by: OPTOMETRIST

## 2024-02-20 PROCEDURE — 92134 CPTRZ OPH DX IMG PST SGM RTA: CPT | Mod: S$GLB,,, | Performed by: OPTOMETRIST

## 2024-02-20 PROCEDURE — 1126F AMNT PAIN NOTED NONE PRSNT: CPT | Mod: CPTII,S$GLB,, | Performed by: OPTOMETRIST

## 2024-02-20 PROCEDURE — 1160F RVW MEDS BY RX/DR IN RCRD: CPT | Mod: CPTII,S$GLB,, | Performed by: OPTOMETRIST

## 2024-02-20 PROCEDURE — 99214 OFFICE O/P EST MOD 30 MIN: CPT | Mod: S$GLB,,, | Performed by: OPTOMETRIST

## 2024-02-20 PROCEDURE — 3061F NEG MICROALBUMINURIA REV: CPT | Mod: CPTII,S$GLB,, | Performed by: OPTOMETRIST

## 2024-02-20 PROCEDURE — 3288F FALL RISK ASSESSMENT DOCD: CPT | Mod: CPTII,S$GLB,, | Performed by: OPTOMETRIST

## 2024-02-20 NOTE — PROGRESS NOTES
HPI    74 YO male presents today for an IOP check/OCT mac. Patient states that he   is doing well, notes no problems or complaints.     Timoptic OU QAM  Latanoprost OU QHS   Last edited by Venus Gilbert on 2/20/2024  8:50 AM.            Assessment /Plan     For exam results, see Encounter Report.    Primary open angle glaucoma (POAG) of left eye, moderate stage    Primary open angle glaucoma (POAG) of right eye, mild stage    Epiretinal membrane (ERM) of left eye  -     Posterior Segment OCT Retina-Both eyes      1. Primary open angle glaucoma (POAG) of left eye, moderate stage  2. Primary open angle glaucoma (POAG) of right eye, mild stage  TMax 21/34   / 609    OCT / HVF 01/2024  OCT --  OD stable, no rnfl thinning  OS worsened, borderline NS/N,T, ONL TS/G/NI/TI    HVF --  OD GHT within normal limits  OS worsened, superior defect, inferior arcuate    IOP much improved with drops  Target mid teens    Continue timolol qAM OU / latanoprost qPM OU    Recheck IOP in 4 months    3. Epiretinal membrane (ERM) of left eye  Not VS  VMT resolved  Reviewed mac OCT today  Observe yearly, sooner if any vision changes    - Posterior Segment OCT Retina-Both eyes

## 2024-02-26 DIAGNOSIS — H40.053 BILATERAL OCULAR HYPERTENSION: ICD-10-CM

## 2024-02-29 RX ORDER — LATANOPROST 50 UG/ML
SOLUTION/ DROPS OPHTHALMIC
Qty: 7.5 ML | Refills: 0 | Status: SHIPPED | OUTPATIENT
Start: 2024-02-29 | End: 2024-03-12 | Stop reason: SDUPTHER

## 2024-03-12 DIAGNOSIS — H40.053 BILATERAL OCULAR HYPERTENSION: ICD-10-CM

## 2024-03-12 RX ORDER — LATANOPROST 50 UG/ML
SOLUTION/ DROPS OPHTHALMIC
Qty: 7.5 ML | Refills: 4 | Status: SHIPPED | OUTPATIENT
Start: 2024-03-12

## 2024-03-25 DIAGNOSIS — M05.79 RHEUMATOID ARTHRITIS INVOLVING MULTIPLE SITES WITH POSITIVE RHEUMATOID FACTOR: Primary | ICD-10-CM

## 2024-03-25 RX ORDER — ADALIMUMAB-ADAZ 40 MG/.4ML
40 INJECTION, SOLUTION SUBCUTANEOUS
Qty: 2.4 ML | Refills: 2 | Status: SHIPPED | OUTPATIENT
Start: 2024-03-25 | End: 2024-03-25 | Stop reason: SDUPTHER

## 2024-03-25 RX ORDER — ADALIMUMAB-ADAZ 40 MG/.4ML
40 INJECTION, SOLUTION SUBCUTANEOUS
Qty: 2.4 ML | Refills: 2 | Status: ACTIVE | OUTPATIENT
Start: 2024-03-25

## 2024-04-26 ENCOUNTER — LAB VISIT (OUTPATIENT)
Dept: LAB | Facility: HOSPITAL | Age: 74
End: 2024-04-26
Attending: STUDENT IN AN ORGANIZED HEALTH CARE EDUCATION/TRAINING PROGRAM
Payer: MEDICARE

## 2024-04-26 DIAGNOSIS — D84.821 DRUG-INDUCED IMMUNODEFICIENCY: ICD-10-CM

## 2024-04-26 DIAGNOSIS — Z79.899 DRUG-INDUCED IMMUNODEFICIENCY: ICD-10-CM

## 2024-04-26 DIAGNOSIS — M15.9 PRIMARY OSTEOARTHRITIS INVOLVING MULTIPLE JOINTS: ICD-10-CM

## 2024-04-26 DIAGNOSIS — Z79.899 HIGH RISK MEDICATION USE: ICD-10-CM

## 2024-04-26 DIAGNOSIS — M05.79 RHEUMATOID ARTHRITIS INVOLVING MULTIPLE SITES WITH POSITIVE RHEUMATOID FACTOR: ICD-10-CM

## 2024-04-26 LAB
ALBUMIN SERPL BCP-MCNC: 4 G/DL (ref 3.5–5.2)
ALP SERPL-CCNC: 49 U/L (ref 55–135)
ALT SERPL W/O P-5'-P-CCNC: 25 U/L (ref 10–44)
ANION GAP SERPL CALC-SCNC: 7 MMOL/L (ref 8–16)
AST SERPL-CCNC: 23 U/L (ref 10–40)
BASOPHILS # BLD AUTO: 0.04 K/UL (ref 0–0.2)
BASOPHILS NFR BLD: 0.7 % (ref 0–1.9)
BILIRUB SERPL-MCNC: 0.5 MG/DL (ref 0.1–1)
BUN SERPL-MCNC: 16 MG/DL (ref 8–23)
CALCIUM SERPL-MCNC: 9.3 MG/DL (ref 8.7–10.5)
CHLORIDE SERPL-SCNC: 105 MMOL/L (ref 95–110)
CO2 SERPL-SCNC: 28 MMOL/L (ref 23–29)
CREAT SERPL-MCNC: 0.9 MG/DL (ref 0.5–1.4)
CRP SERPL-MCNC: <0.3 MG/L (ref 0–8.2)
DIFFERENTIAL METHOD BLD: ABNORMAL
EOSINOPHIL # BLD AUTO: 0.3 K/UL (ref 0–0.5)
EOSINOPHIL NFR BLD: 5.9 % (ref 0–8)
ERYTHROCYTE [DISTWIDTH] IN BLOOD BY AUTOMATED COUNT: 13.9 % (ref 11.5–14.5)
ERYTHROCYTE [SEDIMENTATION RATE] IN BLOOD BY WESTERGREN METHOD: 1 MM/HR (ref 0–10)
EST. GFR  (NO RACE VARIABLE): >60 ML/MIN/1.73 M^2
GLUCOSE SERPL-MCNC: 99 MG/DL (ref 70–110)
HCT VFR BLD AUTO: 42.5 % (ref 40–54)
HGB BLD-MCNC: 13.4 G/DL (ref 14–18)
IMM GRANULOCYTES # BLD AUTO: 0.01 K/UL (ref 0–0.04)
IMM GRANULOCYTES NFR BLD AUTO: 0.2 % (ref 0–0.5)
LYMPHOCYTES # BLD AUTO: 2.4 K/UL (ref 1–4.8)
LYMPHOCYTES NFR BLD: 45.2 % (ref 18–48)
MCH RBC QN AUTO: 29.7 PG (ref 27–31)
MCHC RBC AUTO-ENTMCNC: 31.5 G/DL (ref 32–36)
MCV RBC AUTO: 94 FL (ref 82–98)
MONOCYTES # BLD AUTO: 0.5 K/UL (ref 0.3–1)
MONOCYTES NFR BLD: 8.7 % (ref 4–15)
NEUTROPHILS # BLD AUTO: 2.1 K/UL (ref 1.8–7.7)
NEUTROPHILS NFR BLD: 39.3 % (ref 38–73)
NRBC BLD-RTO: 0 /100 WBC
PLATELET # BLD AUTO: 222 K/UL (ref 150–450)
PMV BLD AUTO: 10.4 FL (ref 9.2–12.9)
POTASSIUM SERPL-SCNC: 4.4 MMOL/L (ref 3.5–5.1)
PROT SERPL-MCNC: 6.9 G/DL (ref 6–8.4)
RBC # BLD AUTO: 4.51 M/UL (ref 4.6–6.2)
SODIUM SERPL-SCNC: 140 MMOL/L (ref 136–145)
WBC # BLD AUTO: 5.38 K/UL (ref 3.9–12.7)

## 2024-04-26 PROCEDURE — 80053 COMPREHEN METABOLIC PANEL: CPT | Performed by: STUDENT IN AN ORGANIZED HEALTH CARE EDUCATION/TRAINING PROGRAM

## 2024-04-26 PROCEDURE — 85651 RBC SED RATE NONAUTOMATED: CPT | Mod: PO | Performed by: STUDENT IN AN ORGANIZED HEALTH CARE EDUCATION/TRAINING PROGRAM

## 2024-04-26 PROCEDURE — 36415 COLL VENOUS BLD VENIPUNCTURE: CPT | Mod: PO | Performed by: STUDENT IN AN ORGANIZED HEALTH CARE EDUCATION/TRAINING PROGRAM

## 2024-04-26 PROCEDURE — 85025 COMPLETE CBC W/AUTO DIFF WBC: CPT | Performed by: STUDENT IN AN ORGANIZED HEALTH CARE EDUCATION/TRAINING PROGRAM

## 2024-04-26 PROCEDURE — 86140 C-REACTIVE PROTEIN: CPT | Performed by: STUDENT IN AN ORGANIZED HEALTH CARE EDUCATION/TRAINING PROGRAM

## 2024-05-29 ENCOUNTER — TELEPHONE (OUTPATIENT)
Dept: RHEUMATOLOGY | Facility: CLINIC | Age: 74
End: 2024-05-29
Payer: MEDICARE

## 2024-05-29 NOTE — TELEPHONE ENCOUNTER
Does patient need a new Hyrimoz pen or asking about alternative treatment? Not understanding message from the phone room    If pen is malfunctioning, patient needs to reach out to [facility] about getting another pen    Staff,  Please call patient to see what his concern is so office can help. Thanks!

## 2024-05-29 NOTE — TELEPHONE ENCOUNTER
----- Message from Alicia Lora LPN sent at 5/29/2024  2:51 PM CDT -----  It looks like he has been on it for about 2 months.  ----- Message -----  From: Carina Winchester  Sent: 5/29/2024  10:56 AM CDT  To: [facility] Staff    Name of Who is Calling:KARIN RIOS JR. [[id number]]        What is the request in detail:Pt called in inform Dr Durand he was unable to use adalimumab-adaz (HYRIMOZ,CF, PEN) 40 mg/0.4 mL PnIj.Pt  reports not working and would like to request another.Please advise thank you       Can the clinic reply by [facility]:NO         What Number to Call Back [facility]Telephone Information:  Mobile          [phone]

## 2024-05-30 ENCOUNTER — TELEPHONE (OUTPATIENT)
Dept: RHEUMATOLOGY | Facility: CLINIC | Age: 74
End: 2024-05-30
Payer: MEDICARE

## 2024-05-30 NOTE — TELEPHONE ENCOUNTER
Was not able to leave a message for the patient. Will send a portal message.  ----- Message from Nitza Ruiz MD sent at 5/29/2024  3:49 PM CDT -----  Needs appointment  ----- Message -----  From: Alicia Lora LPN  Sent: 5/29/2024   2:51 PM CDT  To: Nitza Ruiz MD; #    It looks like he has been on it for about 2 months.  ----- Message -----  From: Carina Winchester  Sent: 5/29/2024  10:56 AM CDT  To: [facility] Staff    Name of Who is Calling:KARIN RIOS JR. [[id number]]        What is the request in detail:Pt called in inform Dr Durand he was unable to use adalimumab-adaz (HYRIMOZ,CF, PEN) 40 mg/0.4 mL PnIj.Pt  reports not working and would like to request another.Please advise thank you       Can the clinic reply by [facility]:NO         What Number to Call Back if not in [facility]:.Telephone Information:  Mobile          [phone]

## 2024-06-25 ENCOUNTER — OFFICE VISIT (OUTPATIENT)
Dept: OPTOMETRY | Facility: CLINIC | Age: 74
End: 2024-06-25
Payer: MEDICARE

## 2024-06-25 DIAGNOSIS — H25.13 NUCLEAR SCLEROSIS, BILATERAL: ICD-10-CM

## 2024-06-25 DIAGNOSIS — H52.7 REFRACTIVE ERROR: ICD-10-CM

## 2024-06-25 DIAGNOSIS — H40.1122 PRIMARY OPEN ANGLE GLAUCOMA (POAG) OF LEFT EYE, MODERATE STAGE: Primary | ICD-10-CM

## 2024-06-25 DIAGNOSIS — H40.1111 PRIMARY OPEN ANGLE GLAUCOMA (POAG) OF RIGHT EYE, MILD STAGE: ICD-10-CM

## 2024-06-25 DIAGNOSIS — H53.031 STRABISMIC AMBLYOPIA OF RIGHT EYE: ICD-10-CM

## 2024-06-25 PROCEDURE — 3288F FALL RISK ASSESSMENT DOCD: CPT | Mod: CPTII,S$GLB,, | Performed by: OPTOMETRIST

## 2024-06-25 PROCEDURE — 3061F NEG MICROALBUMINURIA REV: CPT | Mod: CPTII,S$GLB,, | Performed by: OPTOMETRIST

## 2024-06-25 PROCEDURE — G2211 COMPLEX E/M VISIT ADD ON: HCPCS | Mod: S$GLB,,, | Performed by: OPTOMETRIST

## 2024-06-25 PROCEDURE — 3066F NEPHROPATHY DOC TX: CPT | Mod: CPTII,S$GLB,, | Performed by: OPTOMETRIST

## 2024-06-25 PROCEDURE — 1126F AMNT PAIN NOTED NONE PRSNT: CPT | Mod: CPTII,S$GLB,, | Performed by: OPTOMETRIST

## 2024-06-25 PROCEDURE — 3044F HG A1C LEVEL LT 7.0%: CPT | Mod: CPTII,S$GLB,, | Performed by: OPTOMETRIST

## 2024-06-25 PROCEDURE — 1101F PT FALLS ASSESS-DOCD LE1/YR: CPT | Mod: CPTII,S$GLB,, | Performed by: OPTOMETRIST

## 2024-06-25 PROCEDURE — 1159F MED LIST DOCD IN RCRD: CPT | Mod: CPTII,S$GLB,, | Performed by: OPTOMETRIST

## 2024-06-25 PROCEDURE — 99213 OFFICE O/P EST LOW 20 MIN: CPT | Mod: S$GLB,,, | Performed by: OPTOMETRIST

## 2024-06-25 PROCEDURE — 1160F RVW MEDS BY RX/DR IN RCRD: CPT | Mod: CPTII,S$GLB,, | Performed by: OPTOMETRIST

## 2024-06-25 PROCEDURE — 99999 PR PBB SHADOW E&M-EST. PATIENT-LVL III: CPT | Mod: PBBFAC,,, | Performed by: OPTOMETRIST

## 2024-06-25 NOTE — PROGRESS NOTES
HPI    75 YO male presents today for an IOP check. Patient states that he is   noticing trouble with reading closed captions on the TV. Patient states   that he does have glasses but does not wear them.     Timolol OU QAM   Latanoprost OU QHS  Last edited by Venus Gilbert on 6/25/2024  7:25 AM.            Assessment /Plan     For exam results, see Encounter Report.    Primary open angle glaucoma (POAG) of left eye, moderate stage    Primary open angle glaucoma (POAG) of right eye, mild stage    Nuclear sclerosis, bilateral    Strabismic amblyopia of right eye    Refractive error      1. Primary open angle glaucoma (POAG) of left eye, moderate stage  2. Primary open angle glaucoma (POAG) of right eye, mild stage  TMax 21/34   / 609    OCT / HVF 01/2024    IOP stable with drops -- target mid teens  Continue timolol qAM OU / latanoprost qPM OU    Visit today is associated with current or anticipated ongoing medical care related to this patients single serious condition/complex condition (primary open angle glaucoma of both eyes)     Recheck IOP in 4 months    3. Nuclear sclerosis, bilateral  4. Strabismic amblyopia of right eye  5. Refractive error  Moderate OU, approaching visual significance  Recheck in 4 months with specs/refraction -- refer for cataract eval if no improvement

## 2024-06-26 DIAGNOSIS — H40.1122 PRIMARY OPEN ANGLE GLAUCOMA (POAG) OF LEFT EYE, MODERATE STAGE: ICD-10-CM

## 2024-06-27 RX ORDER — TIMOLOL MALEATE 5 MG/ML
1 SOLUTION/ DROPS OPHTHALMIC DAILY
Qty: 10 ML | Refills: 1 | Status: SHIPPED | OUTPATIENT
Start: 2024-06-27 | End: 2025-06-27

## 2024-07-17 ENCOUNTER — LAB VISIT (OUTPATIENT)
Dept: LAB | Facility: HOSPITAL | Age: 74
End: 2024-07-17
Attending: STUDENT IN AN ORGANIZED HEALTH CARE EDUCATION/TRAINING PROGRAM
Payer: MEDICARE

## 2024-07-17 DIAGNOSIS — D84.821 DRUG-INDUCED IMMUNODEFICIENCY: ICD-10-CM

## 2024-07-17 DIAGNOSIS — Z79.899 HIGH RISK MEDICATION USE: ICD-10-CM

## 2024-07-17 DIAGNOSIS — M15.9 PRIMARY OSTEOARTHRITIS INVOLVING MULTIPLE JOINTS: ICD-10-CM

## 2024-07-17 DIAGNOSIS — M05.79 RHEUMATOID ARTHRITIS INVOLVING MULTIPLE SITES WITH POSITIVE RHEUMATOID FACTOR: ICD-10-CM

## 2024-07-17 DIAGNOSIS — Z79.899 DRUG-INDUCED IMMUNODEFICIENCY: ICD-10-CM

## 2024-07-17 LAB
ALBUMIN SERPL BCP-MCNC: 3.8 G/DL (ref 3.5–5.2)
ALP SERPL-CCNC: 55 U/L (ref 55–135)
ALT SERPL W/O P-5'-P-CCNC: 16 U/L (ref 10–44)
ANION GAP SERPL CALC-SCNC: 5 MMOL/L (ref 8–16)
AST SERPL-CCNC: 14 U/L (ref 10–40)
BASOPHILS # BLD AUTO: 0.02 K/UL (ref 0–0.2)
BASOPHILS NFR BLD: 0.4 % (ref 0–1.9)
BILIRUB SERPL-MCNC: 1.2 MG/DL (ref 0.1–1)
BUN SERPL-MCNC: 10 MG/DL (ref 8–23)
CALCIUM SERPL-MCNC: 9.1 MG/DL (ref 8.7–10.5)
CHLORIDE SERPL-SCNC: 106 MMOL/L (ref 95–110)
CO2 SERPL-SCNC: 27 MMOL/L (ref 23–29)
CREAT SERPL-MCNC: 0.8 MG/DL (ref 0.5–1.4)
CRP SERPL-MCNC: 0.5 MG/L (ref 0–8.2)
DIFFERENTIAL METHOD BLD: ABNORMAL
EOSINOPHIL # BLD AUTO: 0.1 K/UL (ref 0–0.5)
EOSINOPHIL NFR BLD: 2.8 % (ref 0–8)
ERYTHROCYTE [DISTWIDTH] IN BLOOD BY AUTOMATED COUNT: 13.7 % (ref 11.5–14.5)
ERYTHROCYTE [SEDIMENTATION RATE] IN BLOOD BY WESTERGREN METHOD: 4 MM/HR (ref 0–10)
EST. GFR  (NO RACE VARIABLE): >60 ML/MIN/1.73 M^2
GLUCOSE SERPL-MCNC: 127 MG/DL (ref 70–110)
HBV CORE AB SERPL QL IA: NORMAL
HBV SURFACE AG SERPL QL IA: NORMAL
HCT VFR BLD AUTO: 40.8 % (ref 40–54)
HCV AB SERPL QL IA: NORMAL
HGB BLD-MCNC: 13.5 G/DL (ref 14–18)
IMM GRANULOCYTES # BLD AUTO: 0.01 K/UL (ref 0–0.04)
IMM GRANULOCYTES NFR BLD AUTO: 0.2 % (ref 0–0.5)
LYMPHOCYTES # BLD AUTO: 1.4 K/UL (ref 1–4.8)
LYMPHOCYTES NFR BLD: 28.7 % (ref 18–48)
MCH RBC QN AUTO: 30.8 PG (ref 27–31)
MCHC RBC AUTO-ENTMCNC: 33.1 G/DL (ref 32–36)
MCV RBC AUTO: 93 FL (ref 82–98)
MONOCYTES # BLD AUTO: 0.4 K/UL (ref 0.3–1)
MONOCYTES NFR BLD: 7.6 % (ref 4–15)
NEUTROPHILS # BLD AUTO: 3 K/UL (ref 1.8–7.7)
NEUTROPHILS NFR BLD: 60.3 % (ref 38–73)
NRBC BLD-RTO: 0 /100 WBC
PLATELET # BLD AUTO: 226 K/UL (ref 150–450)
PMV BLD AUTO: 10.2 FL (ref 9.2–12.9)
POTASSIUM SERPL-SCNC: 5.1 MMOL/L (ref 3.5–5.1)
PROT SERPL-MCNC: 6.7 G/DL (ref 6–8.4)
RBC # BLD AUTO: 4.38 M/UL (ref 4.6–6.2)
SODIUM SERPL-SCNC: 138 MMOL/L (ref 136–145)
WBC # BLD AUTO: 4.98 K/UL (ref 3.9–12.7)

## 2024-07-17 PROCEDURE — 86704 HEP B CORE ANTIBODY TOTAL: CPT | Performed by: STUDENT IN AN ORGANIZED HEALTH CARE EDUCATION/TRAINING PROGRAM

## 2024-07-17 PROCEDURE — 85651 RBC SED RATE NONAUTOMATED: CPT | Mod: PO | Performed by: STUDENT IN AN ORGANIZED HEALTH CARE EDUCATION/TRAINING PROGRAM

## 2024-07-17 PROCEDURE — 80053 COMPREHEN METABOLIC PANEL: CPT | Performed by: STUDENT IN AN ORGANIZED HEALTH CARE EDUCATION/TRAINING PROGRAM

## 2024-07-17 PROCEDURE — 86140 C-REACTIVE PROTEIN: CPT | Performed by: STUDENT IN AN ORGANIZED HEALTH CARE EDUCATION/TRAINING PROGRAM

## 2024-07-17 PROCEDURE — 87340 HEPATITIS B SURFACE AG IA: CPT | Performed by: STUDENT IN AN ORGANIZED HEALTH CARE EDUCATION/TRAINING PROGRAM

## 2024-07-17 PROCEDURE — 86803 HEPATITIS C AB TEST: CPT | Performed by: STUDENT IN AN ORGANIZED HEALTH CARE EDUCATION/TRAINING PROGRAM

## 2024-07-17 PROCEDURE — 36415 COLL VENOUS BLD VENIPUNCTURE: CPT | Mod: PO | Performed by: STUDENT IN AN ORGANIZED HEALTH CARE EDUCATION/TRAINING PROGRAM

## 2024-07-17 PROCEDURE — 85025 COMPLETE CBC W/AUTO DIFF WBC: CPT | Performed by: STUDENT IN AN ORGANIZED HEALTH CARE EDUCATION/TRAINING PROGRAM

## 2024-07-22 ENCOUNTER — TELEPHONE (OUTPATIENT)
Dept: FAMILY MEDICINE | Facility: CLINIC | Age: 74
End: 2024-07-22
Payer: MEDICARE

## 2024-07-22 DIAGNOSIS — Z79.899 ENCOUNTER FOR LONG-TERM (CURRENT) USE OF OTHER MEDICATIONS: ICD-10-CM

## 2024-07-22 DIAGNOSIS — E11.9 TYPE 2 DIABETES MELLITUS WITHOUT RETINOPATHY: Primary | ICD-10-CM

## 2024-07-22 NOTE — TELEPHONE ENCOUNTER
Patient here for lab. Added A1C. Doesn't look like he needs anything else on written order guidelines.

## 2024-07-23 LAB — HBA1C MFR BLD: 6.4 % OF TOTAL HGB

## 2024-07-26 ENCOUNTER — OFFICE VISIT (OUTPATIENT)
Dept: RHEUMATOLOGY | Facility: CLINIC | Age: 74
End: 2024-07-26
Payer: MEDICARE

## 2024-07-26 VITALS
SYSTOLIC BLOOD PRESSURE: 135 MMHG | HEART RATE: 61 BPM | WEIGHT: 162.25 LBS | DIASTOLIC BLOOD PRESSURE: 71 MMHG | BODY MASS INDEX: 25.41 KG/M2

## 2024-07-26 DIAGNOSIS — M05.79 RHEUMATOID ARTHRITIS INVOLVING MULTIPLE SITES WITH POSITIVE RHEUMATOID FACTOR: Primary | ICD-10-CM

## 2024-07-26 DIAGNOSIS — H40.1122 PRIMARY OPEN ANGLE GLAUCOMA (POAG) OF LEFT EYE, MODERATE STAGE: ICD-10-CM

## 2024-07-26 DIAGNOSIS — D84.821 DRUG-INDUCED IMMUNODEFICIENCY: ICD-10-CM

## 2024-07-26 DIAGNOSIS — Z79.899 DRUG-INDUCED IMMUNODEFICIENCY: ICD-10-CM

## 2024-07-26 DIAGNOSIS — M15.9 PRIMARY OSTEOARTHRITIS INVOLVING MULTIPLE JOINTS: ICD-10-CM

## 2024-07-26 DIAGNOSIS — Z79.899 HIGH RISK MEDICATION USE: ICD-10-CM

## 2024-07-26 PROBLEM — M15.0 PRIMARY OSTEOARTHRITIS INVOLVING MULTIPLE JOINTS: Status: ACTIVE | Noted: 2024-07-26

## 2024-07-26 PROCEDURE — 99999 PR PBB SHADOW E&M-EST. PATIENT-LVL III: CPT | Mod: PBBFAC,,, | Performed by: STUDENT IN AN ORGANIZED HEALTH CARE EDUCATION/TRAINING PROGRAM

## 2024-07-26 ASSESSMENT — ROUTINE ASSESSMENT OF PATIENT INDEX DATA (RAPID3)
PATIENT GLOBAL ASSESSMENT SCORE: 1
PAIN SCORE: 2
FATIGUE SCORE: 0
MDHAQ FUNCTION SCORE: 0
PSYCHOLOGICAL DISTRESS SCORE: 0
TOTAL RAPID3 SCORE: 1

## 2024-07-26 NOTE — PROGRESS NOTES
Subjective:      Patient ID: Kyrie Norton Jr. is a 74 y.o. male.    Chief Complaint: Disease Management    HPI    Rheumatologic History:   - Diagnosis/es:              - seronegative rheumatoid arthritis  - Positive serologies: -  - Negative serologies: RF, CCP  - Infectious screening labs: positive TB gold  (09/2022) negative hepatitis B and C (7/17/24)  - Imaging:              - DEXA scan (8/2022) Normal BMD              - Xray arthritis survey (9/2022) no obvious erosive changes  - Previous Treatments:              - Prednisone              - HCQ 400mg daily: Ineffective  - Current Treatments:               - MTX 12.5mg weekly plus folic acid daily (dose decreased 7/26/24)  - Humira biweekly (6/2023- )  - Plaquenil eye exam: No toxicity (4/2022)     Interval History:  He is doing well and denies joint pain, swelling, medication adverse effects and recent infections.    Objective:   /71   Pulse 61   Wt 73.6 kg (162 lb 4.1 oz)   BMI 25.41 kg/m²   Physical Exam   Constitutional: normal appearance.   HENT:   Head: Normocephalic and atraumatic.   Cardiovascular: Normal rate, regular rhythm and normal heart sounds.   Pulmonary/Chest: Effort normal and breath sounds normal.   Musculoskeletal:      Comments: + Heberden's and Letha's nodes  No synovitis, dactylitis, enthesitis, effusions     Neurological: He is alert.   Skin: Skin is warm and dry. No rash noted.   No skin thickening, telangiectasias, calcinosis, psoriasiform lesions, lupoid lesions        6/5/2023 9/8/2023 1/26/2024   Tender (JIMENEZ-28) 4 / 28  0 / 28  0 / 28    Swollen (JIMENEZ-28) 0 / 28  0 / 28  0 / 28    Provider Global -- 20 mm 10 mm   Patient Global 50 mm 20 mm 10 mm   ESR 5 mm/hr 0 mm/hr 2 mm/hr   CRP 1.3 mg/L -- 0.4 mg/L   JIMENEZ-28 (ESR) 2.95 (Low disease activity) -- 0.63 (Remission)   JIMENEZ-28 (CRP) 3.08 (Low disease activity) -- 1.22 (Remission)   CDAI Score -- 4  2      Labs (7/17/24)  CBC HGB 13.5, WBC, PLT WNL   CMP CR, AST, ALT WNL   ESR  CRP WNL     Assessment:     1. Rheumatoid arthritis involving multiple sites with positive rheumatoid factor    2. Primary osteoarthritis involving multiple joints    3. Drug-induced immunodeficiency    4. High risk medication use        This is a 73-year-old man with past medical history of HLD, T2DM, positive quantiferon with borderline T-spot but no symptoms s/p prophylactic therapy, and seronegative rheumatoid arthritis on methotrexate 17.5 mg weekly, and Humira biweekly. He is in remission. Decrease MTX to 12.5mg weekly.     Plan:     Problem List Items Addressed This Visit          Immunology/Multi System    Rheumatoid arthritis - Primary    Drug-induced immunodeficiency       Orthopedic    Primary osteoarthritis involving multiple joints       Palliative Care    High risk medication use     1.) Seronegative rheumatoid arthritis  2.) high risk medication use  3.) Drug induced immunodeficiency  - Humira biweekly  - MTX 12.5mg weekly plus folic acid daily   - Robaxin PRN for cramps  - CBC, CMP, ESR, CRP every 12 weeks  - Pre-DMARD labs yearly  - DEXA scan at follow up  - Immunizations: Zoster live (2015), flu (1/22), PCV13 (2020), PCV20 (9/2022)     Follow up in 6 months    30 minutes of total time spent on the encounter, which includes face to face time and non-face to face time preparing to see the patient (eg, review of tests), Obtaining and/or reviewing separately obtained history, Documenting clinical information in the electronic or other health record, Independently interpreting results (not separately reported) and communicating results to the patient/family/caregiver, or Care coordination (not separately reported).     This note was prepared with [employer] voice recognition transcription software. Garbled syntax, mangled pronouns, and other bizarre constructions may be attributed to that software system       Nitza Ruiz M.D.  Rheumatology Dept  [place], [place]

## 2024-07-29 DIAGNOSIS — M05.79 RHEUMATOID ARTHRITIS INVOLVING MULTIPLE SITES WITH POSITIVE RHEUMATOID FACTOR: ICD-10-CM

## 2024-07-29 RX ORDER — METHOTREXATE 2.5 MG/1
17.5 TABLET ORAL
Qty: 84 TABLET | Refills: 1 | Status: SHIPPED | OUTPATIENT
Start: 2024-07-29 | End: 2025-01-25

## 2024-07-30 ENCOUNTER — OFFICE VISIT (OUTPATIENT)
Dept: FAMILY MEDICINE | Facility: CLINIC | Age: 74
End: 2024-07-30
Payer: MEDICARE

## 2024-07-30 VITALS
HEIGHT: 67 IN | DIASTOLIC BLOOD PRESSURE: 58 MMHG | SYSTOLIC BLOOD PRESSURE: 100 MMHG | HEART RATE: 70 BPM | RESPIRATION RATE: 18 BRPM | BODY MASS INDEX: 25.11 KG/M2 | WEIGHT: 160 LBS | OXYGEN SATURATION: 97 %

## 2024-07-30 DIAGNOSIS — E78.5 HYPERLIPIDEMIA ASSOCIATED WITH TYPE 2 DIABETES MELLITUS: ICD-10-CM

## 2024-07-30 DIAGNOSIS — M06.9 RHEUMATOID ARTHRITIS, INVOLVING UNSPECIFIED SITE, UNSPECIFIED WHETHER RHEUMATOID FACTOR PRESENT: ICD-10-CM

## 2024-07-30 DIAGNOSIS — E11.69 HYPERLIPIDEMIA ASSOCIATED WITH TYPE 2 DIABETES MELLITUS: ICD-10-CM

## 2024-07-30 DIAGNOSIS — E11.9 TYPE 2 DIABETES MELLITUS WITHOUT RETINOPATHY: Primary | ICD-10-CM

## 2024-07-30 PROCEDURE — 3008F BODY MASS INDEX DOCD: CPT | Mod: CPTII,S$GLB,, | Performed by: PHYSICIAN ASSISTANT

## 2024-07-30 PROCEDURE — 1126F AMNT PAIN NOTED NONE PRSNT: CPT | Mod: CPTII,S$GLB,, | Performed by: PHYSICIAN ASSISTANT

## 2024-07-30 PROCEDURE — 3061F NEG MICROALBUMINURIA REV: CPT | Mod: CPTII,S$GLB,, | Performed by: PHYSICIAN ASSISTANT

## 2024-07-30 PROCEDURE — 3078F DIAST BP <80 MM HG: CPT | Mod: CPTII,S$GLB,, | Performed by: PHYSICIAN ASSISTANT

## 2024-07-30 PROCEDURE — 3074F SYST BP LT 130 MM HG: CPT | Mod: CPTII,S$GLB,, | Performed by: PHYSICIAN ASSISTANT

## 2024-07-30 PROCEDURE — 99214 OFFICE O/P EST MOD 30 MIN: CPT | Mod: S$GLB,,, | Performed by: PHYSICIAN ASSISTANT

## 2024-07-30 PROCEDURE — 3044F HG A1C LEVEL LT 7.0%: CPT | Mod: CPTII,S$GLB,, | Performed by: PHYSICIAN ASSISTANT

## 2024-07-30 PROCEDURE — 1101F PT FALLS ASSESS-DOCD LE1/YR: CPT | Mod: CPTII,S$GLB,, | Performed by: PHYSICIAN ASSISTANT

## 2024-07-30 PROCEDURE — 3288F FALL RISK ASSESSMENT DOCD: CPT | Mod: CPTII,S$GLB,, | Performed by: PHYSICIAN ASSISTANT

## 2024-07-30 PROCEDURE — 3066F NEPHROPATHY DOC TX: CPT | Mod: CPTII,S$GLB,, | Performed by: PHYSICIAN ASSISTANT

## 2024-07-30 RX ORDER — ATORVASTATIN CALCIUM 40 MG/1
40 TABLET, FILM COATED ORAL DAILY
Qty: 90 TABLET | Refills: 3 | Status: SHIPPED | OUTPATIENT
Start: 2024-07-30 | End: 2025-07-30

## 2024-07-30 RX ORDER — GLIPIZIDE AND METFORMIN HCL 5; 500 MG/1; MG/1
TABLET, FILM COATED ORAL
Qty: 270 TABLET | Refills: 1 | Status: SHIPPED | OUTPATIENT
Start: 2024-07-30

## 2024-07-30 RX ORDER — TIMOLOL MALEATE 5 MG/ML
1 SOLUTION/ DROPS OPHTHALMIC DAILY
Qty: 10 ML | Refills: 1 | Status: SHIPPED | OUTPATIENT
Start: 2024-07-30 | End: 2025-07-30

## 2024-07-30 NOTE — PROGRESS NOTES
SUBJECTIVE:    Patient ID: Kyrie Norton Jr. is a 74 y.o. male.    Chief Complaint: Follow-up (No bottles// refills needed// lab results//pt states he have no complaints today )    74-year-old male presents today for regularly scheduled follow-up.  Past medical history significant for type 2 diabetes, hyperlipidemia, hypertension.  Also follows with Rheumatology Dr. Aguilera for his rheumatoid arthritis.  Reports doing well today.  No new concerns or complaints.  Recent labs completed.  A1c at 6.4% and very well controlled. He is on a new version of adalimumab. Doing fine.    Follow-up  Pertinent negatives include no abdominal pain, arthralgias, chest pain, congestion, coughing, fatigue, fever, headaches or weakness.       Telephone on 07/22/2024   Component Date Value Ref Range Status    Hemoglobin A1C 07/22/2024 6.4 (H)  <5.7 % of total Hgb Final   Lab Visit on 07/17/2024   Component Date Value Ref Range Status    WBC 07/17/2024 4.98  3.90 - 12.70 K/uL Final    RBC 07/17/2024 4.38 (L)  4.60 - 6.20 M/uL Final    Hemoglobin 07/17/2024 13.5 (L)  14.0 - 18.0 g/dL Final    Hematocrit 07/17/2024 40.8  40.0 - 54.0 % Final    MCV 07/17/2024 93  82 - 98 fL Final    MCH 07/17/2024 30.8  27.0 - 31.0 pg Final    MCHC 07/17/2024 33.1  32.0 - 36.0 g/dL Final    RDW 07/17/2024 13.7  11.5 - 14.5 % Final    Platelets 07/17/2024 226  150 - 450 K/uL Final    MPV 07/17/2024 10.2  9.2 - 12.9 fL Final    Immature Granulocytes 07/17/2024 0.2  0.0 - 0.5 % Final    Gran # (ANC) 07/17/2024 3.0  1.8 - 7.7 K/uL Final    Immature Grans (Abs) 07/17/2024 0.01  0.00 - 0.04 K/uL Final    Lymph # 07/17/2024 1.4  1.0 - 4.8 K/uL Final    Mono # 07/17/2024 0.4  0.3 - 1.0 K/uL Final    Eos # 07/17/2024 0.1  0.0 - 0.5 K/uL Final    Baso # 07/17/2024 0.02  0.00 - 0.20 K/uL Final    nRBC 07/17/2024 0  0 /100 WBC Final    Gran % 07/17/2024 60.3  38.0 - 73.0 % Final    Lymph % 07/17/2024 28.7  18.0 - 48.0 % Final    Mono % 07/17/2024  7.6  4.0 - 15.0 % Final    Eosinophil % 07/17/2024 2.8  0.0 - 8.0 % Final    Basophil % 07/17/2024 0.4  0.0 - 1.9 % Final    Differential Method 07/17/2024 Automated   Final    Sodium 07/17/2024 138  136 - 145 mmol/L Final    Potassium 07/17/2024 5.1  3.5 - 5.1 mmol/L Final    Chloride 07/17/2024 106  95 - 110 mmol/L Final    CO2 07/17/2024 27  23 - 29 mmol/L Final    Glucose 07/17/2024 127 (H)  70 - 110 mg/dL Final    BUN 07/17/2024 10  8 - 23 mg/dL Final    Creatinine 07/17/2024 0.8  0.5 - 1.4 mg/dL Final    Calcium 07/17/2024 9.1  8.7 - 10.5 mg/dL Final    Total Protein 07/17/2024 6.7  6.0 - 8.4 g/dL Final    Albumin 07/17/2024 3.8  3.5 - 5.2 g/dL Final    Total Bilirubin 07/17/2024 1.2 (H)  0.1 - 1.0 mg/dL Final    Alkaline Phosphatase 07/17/2024 55  55 - 135 U/L Final    AST 07/17/2024 14  10 - 40 U/L Final    ALT 07/17/2024 16  10 - 44 U/L Final    eGFR 07/17/2024 >60.0  >60 mL/min/1.73 m^2 Final    Anion Gap 07/17/2024 5 (L)  8 - 16 mmol/L Final    Sed Rate 07/17/2024 4  0 - 10 mm/Hr Final    CRP 07/17/2024 0.5  0.0 - 8.2 mg/L Final    Hepatitis B Surface Ag 07/17/2024 Non-reactive  Non-reactive Final    Hep B Core Total Ab 07/17/2024 Non-reactive  Non-reactive Final    Hepatitis C Ab 07/17/2024 Non-reactive  Non-reactive Final   Lab Visit on 04/26/2024   Component Date Value Ref Range Status    WBC 04/26/2024 5.38  3.90 - 12.70 K/uL Final    RBC 04/26/2024 4.51 (L)  4.60 - 6.20 M/uL Final    Hemoglobin 04/26/2024 13.4 (L)  14.0 - 18.0 g/dL Final    Hematocrit 04/26/2024 42.5  40.0 - 54.0 % Final    MCV 04/26/2024 94  82 - 98 fL Final    MCH 04/26/2024 29.7  27.0 - 31.0 pg Final    MCHC 04/26/2024 31.5 (L)  32.0 - 36.0 g/dL Final    RDW 04/26/2024 13.9  11.5 - 14.5 % Final    Platelets 04/26/2024 222  150 - 450 K/uL Final    MPV 04/26/2024 10.4  9.2 - 12.9 fL Final    Immature Granulocytes 04/26/2024 0.2  0.0 - 0.5 % Final    Gran # (ANC) 04/26/2024 2.1  1.8 - 7.7 K/uL  Final    Immature Grans (Abs) 04/26/2024 0.01  0.00 - 0.04 K/uL Final    Lymph # 04/26/2024 2.4  1.0 - 4.8 K/uL Final    Mono # 04/26/2024 0.5  0.3 - 1.0 K/uL Final    Eos # 04/26/2024 0.3  0.0 - 0.5 K/uL Final    Baso # 04/26/2024 0.04  0.00 - 0.20 K/uL Final    nRBC 04/26/2024 0  0 /100 WBC Final    Gran % 04/26/2024 39.3  38.0 - 73.0 % Final    Lymph % 04/26/2024 45.2  18.0 - 48.0 % Final    Mono % 04/26/2024 8.7  4.0 - 15.0 % Final    Eosinophil % 04/26/2024 5.9  0.0 - 8.0 % Final    Basophil % 04/26/2024 0.7  0.0 - 1.9 % Final    Differential Method 04/26/2024 Automated   Final    Sodium 04/26/2024 140  136 - 145 mmol/L Final    Potassium 04/26/2024 4.4  3.5 - 5.1 mmol/L Final    Chloride 04/26/2024 105  95 - 110 mmol/L Final    CO2 04/26/2024 28  23 - 29 mmol/L Final    Glucose 04/26/2024 99  70 - 110 mg/dL Final    BUN 04/26/2024 16  8 - 23 mg/dL Final    Creatinine 04/26/2024 0.9  0.5 - 1.4 mg/dL Final    Calcium 04/26/2024 9.3  8.7 - 10.5 mg/dL Final    Total Protein 04/26/2024 6.9  6.0 - 8.4 g/dL Final    Albumin 04/26/2024 4.0  3.5 - 5.2 g/dL Final    Total Bilirubin 04/26/2024 0.5  0.1 - 1.0 mg/dL Final    Alkaline Phosphatase 04/26/2024 49 (L)  55 - 135 U/L Final    AST 04/26/2024 23  10 - 40 U/L Final    ALT 04/26/2024 25  10 - 44 U/L Final    eGFR 04/26/2024 >60.0  >60 mL/min/1.73 m^2 Final    Anion Gap 04/26/2024 7 (L)  8 - 16 mmol/L Final    Sed Rate 04/26/2024 1  0 - 10 mm/Hr Final    CRP 04/26/2024 <0.3  0.0 - 8.2 mg/L Final       Past Medical History:   Diagnosis Date    Diabetes mellitus type II     Hyperlipidemia     Hypertension      History reviewed. No pertinent surgical history.  Family History   Problem Relation Name Age of Onset    Blindness Neg Hx      Glaucoma Neg Hx      Macular degeneration Neg Hx         Marital Status:   Alcohol History:  reports current alcohol use.  Tobacco History:  reports that he has never smoked. He has never used  smokeless tobacco.  Drug History:  reports no history of drug use.    Review of patient's allergies indicates:   Allergen Reactions    Parafon forte Nausea Only       Current Outpatient Medications:     aspirin (ECOTRIN) 81 MG EC tablet, Take 81 mg by mouth once daily., Disp: , Rfl:     coenzyme Q10 10 mg capsule, Take 10 mg by mouth once daily., Disp: , Rfl:     fish oil-omega-3 fatty acids 300-1,000 mg capsule, Take 2 g by mouth once daily., Disp: , Rfl:     folic acid (FOLVITE) 1 MG tablet, Take 1 tablet (1,000 mcg total) by mouth once daily., Disp: 90 tablet, Rfl: 3    lancets (ACCU-CHEK FASTCLIX) Misc, 1 Device by Misc.(Non-Drug; Combo Route) route Daily., Disp: 100 each, Rfl: 4    methocarbamoL (ROBAXIN) 750 MG Tab, Take 1 tablet (750 mg total) by mouth 3 (three) times daily as needed (Muscle cramps)., Disp: 90 tablet, Rfl: 1    methotrexate 2.5 MG Tab, Take 7 tablets (17.5 mg total) by mouth every 7 days., Disp: 84 tablet, Rfl: 1    MILK THISTLE ORAL, Take 250 mg by mouth once daily. , Disp: , Rfl:     timolol maleate 0.5% (TIMOPTIC) 0.5 % Drop, PLACE 1 DROP INTO BOTH EYES ONCE DAILY, Disp: 10 mL, Rfl: 1    adalimumab-adaz (HYRIMOZ,CF, PEN) 40 mg/0.4 mL PnIj, Inject 40 mg into the skin every 14 (fourteen) days. (Patient not taking: Reported on 7/30/2024), Disp: 2.4 mL, Rfl: 2    atorvastatin (LIPITOR) 40 MG tablet, Take 1 tablet (40 mg total) by mouth once daily., Disp: 90 tablet, Rfl: 3    blood sugar diagnostic (ACCU-CHEK CJ PLUS TEST STRP) Strp, 1 strip by Subdermal route Daily. (Patient not taking: Reported on 7/30/2024), Disp: 100 strip, Rfl: 4    glipizide-metformin (METAGLIP) 5-500 mg per tablet, 1 tablet in the am, 2 tablets in the pm., Disp: 270 tablet, Rfl: 1    latanoprost 0.005 % ophthalmic solution, INSTILL 1 DROP INTO BOTH EYES IN THE EVENING (Patient not taking: Reported on 7/30/2024), Disp: 7.5 mL, Rfl: 4    Review of Systems   Constitutional:  Negative for activity change,  "fatigue, fever and unexpected weight change.   HENT:  Negative for congestion.    Respiratory:  Negative for apnea, cough, chest tightness and shortness of breath.    Cardiovascular:  Negative for chest pain and palpitations.   Gastrointestinal:  Negative for abdominal distention and abdominal pain.   Endocrine: Negative for polyuria.   Genitourinary:  Negative for difficulty urinating and dysuria.   Musculoskeletal:  Negative for arthralgias and back pain.   Neurological:  Negative for dizziness, weakness, light-headedness and headaches.          Objective:      Vitals:    07/30/24 0706   BP: (!) 100/58   Pulse: 70   Resp: 18   SpO2: 97%   Weight: 72.6 kg (160 lb)   Height: 5' 7" (1.702 m)     Physical Exam  Constitutional:       General: He is not in acute distress.     Appearance: He is well-developed.   HENT:      Head: Normocephalic and atraumatic.   Eyes:      Pupils: Pupils are equal, round, and reactive to light.   Neck:      Thyroid: No thyromegaly.   Cardiovascular:      Rate and Rhythm: Normal rate and regular rhythm.      Heart sounds: Normal heart sounds.   Pulmonary:      Effort: Pulmonary effort is normal.      Breath sounds: Normal breath sounds.   Abdominal:      General: Bowel sounds are normal. There is no distension.      Palpations: Abdomen is soft.      Tenderness: There is no abdominal tenderness.   Musculoskeletal:         General: Normal range of motion.      Cervical back: Normal range of motion and neck supple.   Skin:     General: Skin is warm and dry.      Findings: No erythema or rash.   Neurological:      Mental Status: He is alert and oriented to person, place, and time.      Cranial Nerves: No cranial nerve deficit.         Assessment:       1. Type 2 diabetes mellitus without retinopathy    2. Hyperlipidemia associated with type 2 diabetes mellitus    3. Rheumatoid arthritis, involving unspecified site, unspecified whether rheumatoid factor present         Plan:       Type 2 " diabetes mellitus without retinopathy  Comments:  A1c at 6.4% and well managed at this time. will continue as is.  Orders:  -     glipizide-metformin (METAGLIP) 5-500 mg per tablet; 1 tablet in the am, 2 tablets in the pm.  Dispense: 270 tablet; Refill: 1    Hyperlipidemia associated with type 2 diabetes mellitus  Comments:  Labs reviewed and remain stable, refills today. NO changes from pcp standpoint.  Orders:  -     atorvastatin (LIPITOR) 40 MG tablet; Take 1 tablet (40 mg total) by mouth once daily.  Dispense: 90 tablet; Refill: 3    Rheumatoid arthritis, involving unspecified site, unspecified whether rheumatoid factor present  Comments:  follows closely with rheumatology. will keep followup appt as scheduled. doing well on biologic. all sxs are well controlled.      Follow up in about 6 months (around 1/30/2025) for Annual Physical.        7/30/2024 Marco Antonio Loyola PA-C

## 2024-09-30 ENCOUNTER — TELEPHONE (OUTPATIENT)
Dept: RHEUMATOLOGY | Facility: CLINIC | Age: 74
End: 2024-09-30
Payer: MEDICARE

## 2024-09-30 NOTE — TELEPHONE ENCOUNTER
----- Message from Shira sent at 9/30/2024 11:00 AM CDT -----  Regarding: advise  Contact: [facility]  Type: Needs Medical Advice  Who Called:  [facility]   Symptoms (please be specific):  dme orders are being sent  How long has patient had these symptoms:    Pharmacy name and phone #:    Best Call Back Number:   Additional Information:

## 2024-10-03 ENCOUNTER — TELEPHONE (OUTPATIENT)
Dept: RHEUMATOLOGY | Facility: CLINIC | Age: 74
End: 2024-10-03
Payer: MEDICARE

## 2024-10-03 NOTE — TELEPHONE ENCOUNTER
Order was signed, needed last office notes. Sent the office notes to the fax number requested. States understanding  ----- Message from Rosanna sent at 10/3/2024  9:42 AM CDT -----  Contact: Justin  Type:  Needs Medical Advice    Who Called: Justin from [facility]  Symptoms (please be specific): needs chart notes for pt so he can receive right shoulder brace.   Would the patient rather a call back or a response via [facility]? call  Best Call Back Number:   Additional Information: please advise and thank you.

## 2024-10-08 ENCOUNTER — OFFICE VISIT (OUTPATIENT)
Dept: OPTOMETRY | Facility: CLINIC | Age: 74
End: 2024-10-08
Payer: MEDICARE

## 2024-10-08 DIAGNOSIS — H40.1111 PRIMARY OPEN ANGLE GLAUCOMA (POAG) OF RIGHT EYE, MILD STAGE: ICD-10-CM

## 2024-10-08 DIAGNOSIS — H53.031 STRABISMIC AMBLYOPIA OF RIGHT EYE: ICD-10-CM

## 2024-10-08 DIAGNOSIS — H40.1122 PRIMARY OPEN ANGLE GLAUCOMA (POAG) OF LEFT EYE, MODERATE STAGE: Primary | ICD-10-CM

## 2024-10-08 DIAGNOSIS — H52.7 REFRACTIVE ERROR: ICD-10-CM

## 2024-10-08 DIAGNOSIS — H40.053 BILATERAL OCULAR HYPERTENSION: ICD-10-CM

## 2024-10-08 DIAGNOSIS — H25.813 COMBINED FORMS OF AGE-RELATED CATARACT OF BOTH EYES: ICD-10-CM

## 2024-10-08 PROCEDURE — 3288F FALL RISK ASSESSMENT DOCD: CPT | Mod: CPTII,S$GLB,, | Performed by: OPTOMETRIST

## 2024-10-08 PROCEDURE — 3061F NEG MICROALBUMINURIA REV: CPT | Mod: CPTII,S$GLB,, | Performed by: OPTOMETRIST

## 2024-10-08 PROCEDURE — 1160F RVW MEDS BY RX/DR IN RCRD: CPT | Mod: CPTII,S$GLB,, | Performed by: OPTOMETRIST

## 2024-10-08 PROCEDURE — 1159F MED LIST DOCD IN RCRD: CPT | Mod: CPTII,S$GLB,, | Performed by: OPTOMETRIST

## 2024-10-08 PROCEDURE — 3066F NEPHROPATHY DOC TX: CPT | Mod: CPTII,S$GLB,, | Performed by: OPTOMETRIST

## 2024-10-08 PROCEDURE — 1126F AMNT PAIN NOTED NONE PRSNT: CPT | Mod: CPTII,S$GLB,, | Performed by: OPTOMETRIST

## 2024-10-08 PROCEDURE — G2211 COMPLEX E/M VISIT ADD ON: HCPCS | Mod: S$GLB,,, | Performed by: OPTOMETRIST

## 2024-10-08 PROCEDURE — 3044F HG A1C LEVEL LT 7.0%: CPT | Mod: CPTII,S$GLB,, | Performed by: OPTOMETRIST

## 2024-10-08 PROCEDURE — 1101F PT FALLS ASSESS-DOCD LE1/YR: CPT | Mod: CPTII,S$GLB,, | Performed by: OPTOMETRIST

## 2024-10-08 PROCEDURE — 99999 PR PBB SHADOW E&M-EST. PATIENT-LVL III: CPT | Mod: PBBFAC,,, | Performed by: OPTOMETRIST

## 2024-10-08 PROCEDURE — 99214 OFFICE O/P EST MOD 30 MIN: CPT | Mod: S$GLB,,, | Performed by: OPTOMETRIST

## 2024-10-08 RX ORDER — LATANOPROST 50 UG/ML
SOLUTION/ DROPS OPHTHALMIC
Qty: 7.5 ML | Refills: 4 | Status: SHIPPED | OUTPATIENT
Start: 2024-10-08

## 2024-10-08 NOTE — PROGRESS NOTES
HPI     Glaucoma     Additional comments: DLE 6-2024           Comments    Pt here today for IOP check for POAG - OU.   States no visual changes or   complaints.   Has separate specs for near & vision but does not wear.      Happy with uncorrected vision.    Good compliance with gtts:  Timolol OU qam  Latanoprost OU qhs      Hemoglobin A1C       Date                     Value               Ref Range             Status                07/22/2024               6.4 (H)             <5.7 % of tota*       Final                 07/17/2023               6.0 (H)             <5.7 % of tota*       Final                          Last edited by Mohan Calix, OD on 10/8/2024  7:37 AM.            Assessment /Plan     For exam results, see Encounter Report.    Primary open angle glaucoma (POAG) of left eye, moderate stage    Primary open angle glaucoma (POAG) of right eye, mild stage    Combined forms of age-related cataract of both eyes  -     Ambulatory referral/consult to Ophthalmology; Future; Expected date: 11/08/2024    Strabismic amblyopia of right eye    Refractive error      1. Primary open angle glaucoma (POAG) of left eye, moderate stage (Primary)  2. Primary open angle glaucoma (POAG) of right eye, mild stage  TMax 21/34   / 609    OCT / HVF 01/2024    IOP stable with drops -- target mid teens  Denies COPD/CHF/asthma  Continue timolol qAM OU / latanoprost qPM OU  Refilled at this visit    Visit today is associated with current or anticipated ongoing medical care related to this patients single serious condition/complex condition (primary open angle glaucoma of both eyes)     RTC in 4 months for OCT RNFL / HVF 24-2 SF     3. Combined forms of age-related cataract of both eyes  Moderate, OS > OD  No significant improvement with refraction  Pt notes difficulties with specs, affecting ADLs  Discussed options  Referred to Dr. Lockhart for eval    - Ambulatory referral/consult to Ophthalmology; Future    4. Strabismic  amblyopia of right eye  5. Refractive error  Reduced BCVA OD - longstanding secondary to strabsimic amblyopia

## 2024-10-14 ENCOUNTER — LAB VISIT (OUTPATIENT)
Dept: LAB | Facility: HOSPITAL | Age: 74
End: 2024-10-14
Attending: STUDENT IN AN ORGANIZED HEALTH CARE EDUCATION/TRAINING PROGRAM
Payer: MEDICARE

## 2024-10-14 DIAGNOSIS — Z79.899 DRUG-INDUCED IMMUNODEFICIENCY: ICD-10-CM

## 2024-10-14 DIAGNOSIS — Z79.899 HIGH RISK MEDICATION USE: ICD-10-CM

## 2024-10-14 DIAGNOSIS — M05.79 RHEUMATOID ARTHRITIS INVOLVING MULTIPLE SITES WITH POSITIVE RHEUMATOID FACTOR: ICD-10-CM

## 2024-10-14 DIAGNOSIS — M15.0 PRIMARY OSTEOARTHRITIS INVOLVING MULTIPLE JOINTS: ICD-10-CM

## 2024-10-14 DIAGNOSIS — D84.821 DRUG-INDUCED IMMUNODEFICIENCY: ICD-10-CM

## 2024-10-14 LAB
ALBUMIN SERPL BCP-MCNC: 3.7 G/DL (ref 3.5–5.2)
ALP SERPL-CCNC: 50 U/L (ref 55–135)
ALT SERPL W/O P-5'-P-CCNC: 17 U/L (ref 10–44)
ANION GAP SERPL CALC-SCNC: 6 MMOL/L (ref 8–16)
AST SERPL-CCNC: 19 U/L (ref 10–40)
BASOPHILS # BLD AUTO: 0.03 K/UL (ref 0–0.2)
BASOPHILS NFR BLD: 0.7 % (ref 0–1.9)
BILIRUB SERPL-MCNC: 0.7 MG/DL (ref 0.1–1)
BUN SERPL-MCNC: 15 MG/DL (ref 8–23)
CALCIUM SERPL-MCNC: 9 MG/DL (ref 8.7–10.5)
CHLORIDE SERPL-SCNC: 107 MMOL/L (ref 95–110)
CO2 SERPL-SCNC: 25 MMOL/L (ref 23–29)
CREAT SERPL-MCNC: 0.8 MG/DL (ref 0.5–1.4)
CRP SERPL-MCNC: 0.3 MG/L (ref 0–8.2)
DIFFERENTIAL METHOD BLD: ABNORMAL
EOSINOPHIL # BLD AUTO: 0.3 K/UL (ref 0–0.5)
EOSINOPHIL NFR BLD: 8.5 % (ref 0–8)
ERYTHROCYTE [DISTWIDTH] IN BLOOD BY AUTOMATED COUNT: 14.3 % (ref 11.5–14.5)
ERYTHROCYTE [SEDIMENTATION RATE] IN BLOOD BY WESTERGREN METHOD: 9 MM/HR (ref 0–10)
EST. GFR  (NO RACE VARIABLE): >60 ML/MIN/1.73 M^2
GLUCOSE SERPL-MCNC: 101 MG/DL (ref 70–110)
HCT VFR BLD AUTO: 40.6 % (ref 40–54)
HGB BLD-MCNC: 13 G/DL (ref 14–18)
IMM GRANULOCYTES # BLD AUTO: 0.01 K/UL (ref 0–0.04)
IMM GRANULOCYTES NFR BLD AUTO: 0.2 % (ref 0–0.5)
LYMPHOCYTES # BLD AUTO: 1.5 K/UL (ref 1–4.8)
LYMPHOCYTES NFR BLD: 38.4 % (ref 18–48)
MCH RBC QN AUTO: 30.7 PG (ref 27–31)
MCHC RBC AUTO-ENTMCNC: 32 G/DL (ref 32–36)
MCV RBC AUTO: 96 FL (ref 82–98)
MONOCYTES # BLD AUTO: 0.5 K/UL (ref 0.3–1)
MONOCYTES NFR BLD: 12 % (ref 4–15)
NEUTROPHILS # BLD AUTO: 1.6 K/UL (ref 1.8–7.7)
NEUTROPHILS NFR BLD: 40.2 % (ref 38–73)
NRBC BLD-RTO: 0 /100 WBC
PLATELET # BLD AUTO: 225 K/UL (ref 150–450)
PMV BLD AUTO: 9.6 FL (ref 9.2–12.9)
POTASSIUM SERPL-SCNC: 4.4 MMOL/L (ref 3.5–5.1)
PROT SERPL-MCNC: 6.5 G/DL (ref 6–8.4)
RBC # BLD AUTO: 4.24 M/UL (ref 4.6–6.2)
SODIUM SERPL-SCNC: 138 MMOL/L (ref 136–145)
WBC # BLD AUTO: 4.01 K/UL (ref 3.9–12.7)

## 2024-10-14 PROCEDURE — 80053 COMPREHEN METABOLIC PANEL: CPT | Performed by: STUDENT IN AN ORGANIZED HEALTH CARE EDUCATION/TRAINING PROGRAM

## 2024-10-14 PROCEDURE — 85025 COMPLETE CBC W/AUTO DIFF WBC: CPT | Performed by: STUDENT IN AN ORGANIZED HEALTH CARE EDUCATION/TRAINING PROGRAM

## 2024-10-14 PROCEDURE — 36415 COLL VENOUS BLD VENIPUNCTURE: CPT | Mod: PO | Performed by: STUDENT IN AN ORGANIZED HEALTH CARE EDUCATION/TRAINING PROGRAM

## 2024-10-14 PROCEDURE — 86140 C-REACTIVE PROTEIN: CPT | Performed by: STUDENT IN AN ORGANIZED HEALTH CARE EDUCATION/TRAINING PROGRAM

## 2024-10-14 PROCEDURE — 85651 RBC SED RATE NONAUTOMATED: CPT | Mod: PO | Performed by: STUDENT IN AN ORGANIZED HEALTH CARE EDUCATION/TRAINING PROGRAM

## 2024-10-21 ENCOUNTER — TELEPHONE (OUTPATIENT)
Dept: FAMILY MEDICINE | Facility: CLINIC | Age: 74
End: 2024-10-21
Payer: MEDICARE

## 2024-10-21 NOTE — TELEPHONE ENCOUNTER
----- Message from Kareen sent at 10/21/2024  7:47 AM CDT -----  Pt is here and wants to get a copy of all of his medications.  [phone]

## 2024-11-06 LAB
LEFT EYE DM RETINOPATHY: NEGATIVE
RIGHT EYE DM RETINOPATHY: NEGATIVE

## 2024-11-12 ENCOUNTER — PATIENT OUTREACH (OUTPATIENT)
Dept: ADMINISTRATIVE | Facility: HOSPITAL | Age: 74
End: 2024-11-12
Payer: MEDICARE

## 2024-11-12 NOTE — PROGRESS NOTES
Population Health Chart Review & Patient Outreach Details      Additional [facility] Health Notes:               Updates Requested / Reviewed:      Updated Care Coordination Note and Immunizations Reconciliation Completed or Queried: [facility] Topics Overdue:      [facility] Score: 0     Patient is not due for any topics at this time.    Influenza Vaccine  Shingles/Zoster Vaccine  RSV Vaccine                  Health Maintenance Topic(s) Outreach Outcomes & Actions Taken:    Eye Exam - Outreach Outcomes & Actions Taken  : Diabetic Eye External Records Uploaded, Care Team & History Updated if Applicable

## 2024-12-13 DIAGNOSIS — M05.79 RHEUMATOID ARTHRITIS INVOLVING MULTIPLE SITES WITH POSITIVE RHEUMATOID FACTOR: ICD-10-CM

## 2024-12-13 RX ORDER — ADALIMUMAB-ADAZ 40 MG/.4ML
40 INJECTION, SOLUTION SUBCUTANEOUS
Qty: 6 PEN | Refills: 3 | Status: ACTIVE | OUTPATIENT
Start: 2024-12-13

## 2024-12-13 RX ORDER — ADALIMUMAB-ADAZ 40 MG/.4ML
40 INJECTION, SOLUTION SUBCUTANEOUS
Qty: 6 PEN | Refills: 3 | Status: ACTIVE | OUTPATIENT
Start: 2024-12-13 | End: 2024-12-13 | Stop reason: SDUPTHER

## 2024-12-13 NOTE — TELEPHONE ENCOUNTER
----- Message from Jassi sent at 12/13/2024 11:52 AM CST -----  Contact: Self  Type:  RX Refill Request    Who Called:  Patient    Refill or New Rx:  Refill  RX Name and Strength:  adalimumab-adaz (HYRIMOZ,CF, PEN) 40 mg/0.4 mL PnIj    How is the patient currently taking it? (ex. 1XDay):  As Prescribed  Is this a 30 day or 90 day RX:  NA    Preferred Pharmacy with phone number:    [facility] (Ph: [phone])    Local or Mail Order:  Mail Order  Ordering Provider:  Willy Hinojsoa Call Back Number:  [phone]    Additional Information:  Patient is requesting a refill as soon as possible as he is going out of town on the 19th. He took his last one on Wednesday.

## 2025-01-06 ENCOUNTER — TELEPHONE (OUTPATIENT)
Dept: FAMILY MEDICINE | Facility: CLINIC | Age: 75
End: 2025-01-06

## 2025-01-06 ENCOUNTER — OFFICE VISIT (OUTPATIENT)
Dept: FAMILY MEDICINE | Facility: CLINIC | Age: 75
End: 2025-01-06
Payer: MEDICARE

## 2025-01-06 VITALS
SYSTOLIC BLOOD PRESSURE: 114 MMHG | HEART RATE: 69 BPM | BODY MASS INDEX: 25.43 KG/M2 | WEIGHT: 162 LBS | HEIGHT: 67 IN | OXYGEN SATURATION: 99 % | DIASTOLIC BLOOD PRESSURE: 58 MMHG

## 2025-01-06 DIAGNOSIS — E11.69 HYPERLIPIDEMIA ASSOCIATED WITH TYPE 2 DIABETES MELLITUS: ICD-10-CM

## 2025-01-06 DIAGNOSIS — Z79.899 ENCOUNTER FOR LONG-TERM (CURRENT) USE OF MEDICATIONS: Primary | ICD-10-CM

## 2025-01-06 DIAGNOSIS — Z12.5 SPECIAL SCREENING FOR MALIGNANT NEOPLASM OF PROSTATE: ICD-10-CM

## 2025-01-06 DIAGNOSIS — E78.5 HYPERLIPIDEMIA ASSOCIATED WITH TYPE 2 DIABETES MELLITUS: ICD-10-CM

## 2025-01-06 DIAGNOSIS — E11.9 TYPE 2 DIABETES MELLITUS WITHOUT RETINOPATHY: ICD-10-CM

## 2025-01-06 DIAGNOSIS — L20.82 FLEXURAL ECZEMA: Primary | ICD-10-CM

## 2025-01-06 PROCEDURE — 96372 THER/PROPH/DIAG INJ SC/IM: CPT | Mod: S$GLB,,,

## 2025-01-06 PROCEDURE — 3074F SYST BP LT 130 MM HG: CPT | Mod: CPTII,S$GLB,,

## 2025-01-06 PROCEDURE — 1126F AMNT PAIN NOTED NONE PRSNT: CPT | Mod: CPTII,S$GLB,,

## 2025-01-06 PROCEDURE — 1159F MED LIST DOCD IN RCRD: CPT | Mod: CPTII,S$GLB,,

## 2025-01-06 PROCEDURE — 3288F FALL RISK ASSESSMENT DOCD: CPT | Mod: CPTII,S$GLB,,

## 2025-01-06 PROCEDURE — 3078F DIAST BP <80 MM HG: CPT | Mod: CPTII,S$GLB,,

## 2025-01-06 PROCEDURE — 1101F PT FALLS ASSESS-DOCD LE1/YR: CPT | Mod: CPTII,S$GLB,,

## 2025-01-06 PROCEDURE — 99213 OFFICE O/P EST LOW 20 MIN: CPT | Mod: 25,S$GLB,,

## 2025-01-06 PROCEDURE — 3008F BODY MASS INDEX DOCD: CPT | Mod: CPTII,S$GLB,,

## 2025-01-06 PROCEDURE — 1160F RVW MEDS BY RX/DR IN RCRD: CPT | Mod: CPTII,S$GLB,,

## 2025-01-06 RX ORDER — DEXAMETHASONE SODIUM PHOSPHATE 4 MG/ML
8 INJECTION, SOLUTION INTRA-ARTICULAR; INTRALESIONAL; INTRAMUSCULAR; INTRAVENOUS; SOFT TISSUE ONCE
Status: COMPLETED | OUTPATIENT
Start: 2025-01-06 | End: 2025-01-06

## 2025-01-06 RX ORDER — FLUOCINONIDE CREAM (EMULSIFIED BASE) 0.5 MG/G
CREAM TOPICAL 2 TIMES DAILY
Qty: 60 G | Refills: 0 | Status: SHIPPED | OUTPATIENT
Start: 2025-01-06 | End: 2025-01-13

## 2025-01-06 RX ADMIN — DEXAMETHASONE SODIUM PHOSPHATE 8 MG: 4 INJECTION, SOLUTION INTRA-ARTICULAR; INTRALESIONAL; INTRAMUSCULAR; INTRAVENOUS; SOFT TISSUE at 09:01

## 2025-01-06 NOTE — PATIENT INSTRUCTIONS
Thick lotion like Eucerin, Lubriderm, Vaseline Intensive Care, (does not have to be name brand), and apply at least daily, and especially after showering.

## 2025-01-06 NOTE — TELEPHONE ENCOUNTER
Pt here for lab. Orders entered. Advised patient if Adriana wants to add anything else at visit today that he may need to get stuck again. States he does not have urologist to follow PSA.

## 2025-01-06 NOTE — PROGRESS NOTES
SUBJECTIVE:    Patient ID: Kyrie Norton Jr. is a 74 y.o. male.    Chief Complaint: Rash (No bottles//Pt c/o rash on bilat arms and right knee area x 1 1-2 weeks. Pt states it is itchy. Has been using benadryl cream and liquid and other creams to site. Pt states he was doing yard work when it appeared//JL)    HPI  History of Present Illness    CHIEF COMPLAINT:  Kyrie presents today with a persistent rash.    HISTORY OF PRESENT ILLNESS:  He reports a pruritic rash that has persisted for about two weeks. He has attempted treatment with various forms of Benadryl (cream, liquid, and tablets) without relief.    MEDICATIONS:  He continues Adalimumab injection for rheumatoid arthritis.      ROS:  General: -fever, -chills, -fatigue, -weight gain, -weight loss  Eyes: -vision changes, -redness, -discharge  ENT: -ear pain, -nasal congestion, -sore throat  Cardiovascular: -chest pain, -palpitations, -lower extremity edema  Respiratory: -cough, -shortness of breath  Gastrointestinal: -abdominal pain, -nausea, -vomiting, -diarrhea, -constipation, -blood in stool  Genitourinary: -dysuria, -hematuria, -frequency  Musculoskeletal: -joint pain, -muscle pain  Skin: +rash, -lesion, +itching  Neurological: -headache, -dizziness, -numbness, -tingling  Psychiatric: -anxiety, -depression, -sleep difficulty         Patient Outreach on 11/12/2024   Component Date Value Ref Range Status    Left Eye DM Retinopathy 11/06/2024 Negative   Final    Right Eye DM Retinopathy 11/06/2024 Negative   Final   Lab Visit on 10/14/2024   Component Date Value Ref Range Status    WBC 10/14/2024 4.01  3.90 - 12.70 K/uL Final    RBC 10/14/2024 4.24 (L)  4.60 - 6.20 M/uL Final    Hemoglobin 10/14/2024 13.0 (L)  14.0 - 18.0 g/dL Final    Hematocrit 10/14/2024 40.6  40.0 - 54.0 % Final    MCV 10/14/2024 96  82 - 98 fL Final    MCH 10/14/2024 30.7  27.0 - 31.0 pg Final    MCHC 10/14/2024 32.0  32.0 - 36.0 g/dL Final    RDW 10/14/2024 14.3  11.5 - 14.5 % Final     Platelets 10/14/2024 225  150 - 450 K/uL Final    MPV 10/14/2024 9.6  9.2 - 12.9 fL Final    Immature Granulocytes 10/14/2024 0.2  0.0 - 0.5 % Final    Gran # (ANC) 10/14/2024 1.6 (L)  1.8 - 7.7 K/uL Final    Immature Grans (Abs) 10/14/2024 0.01  0.00 - 0.04 K/uL Final    Lymph # 10/14/2024 1.5  1.0 - 4.8 K/uL Final    Mono # 10/14/2024 0.5  0.3 - 1.0 K/uL Final    Eos # 10/14/2024 0.3  0.0 - 0.5 K/uL Final    Baso # 10/14/2024 0.03  0.00 - 0.20 K/uL Final    nRBC 10/14/2024 0  0 /100 WBC Final    Gran % 10/14/2024 40.2  38.0 - 73.0 % Final    Lymph % 10/14/2024 38.4  18.0 - 48.0 % Final    Mono % 10/14/2024 12.0  4.0 - 15.0 % Final    Eosinophil % 10/14/2024 8.5 (H)  0.0 - 8.0 % Final    Basophil % 10/14/2024 0.7  0.0 - 1.9 % Final    Differential Method 10/14/2024 Automated   Final    Sodium 10/14/2024 138  136 - 145 mmol/L Final    Potassium 10/14/2024 4.4  3.5 - 5.1 mmol/L Final    Chloride 10/14/2024 107  95 - 110 mmol/L Final    CO2 10/14/2024 25  23 - 29 mmol/L Final    Glucose 10/14/2024 101  70 - 110 mg/dL Final    BUN 10/14/2024 15  8 - 23 mg/dL Final    Creatinine 10/14/2024 0.8  0.5 - 1.4 mg/dL Final    Calcium 10/14/2024 9.0  8.7 - 10.5 mg/dL Final    Total Protein 10/14/2024 6.5  6.0 - 8.4 g/dL Final    Albumin 10/14/2024 3.7  3.5 - 5.2 g/dL Final    Total Bilirubin 10/14/2024 0.7  0.1 - 1.0 mg/dL Final    Alkaline Phosphatase 10/14/2024 50 (L)  55 - 135 U/L Final    AST 10/14/2024 19  10 - 40 U/L Final    ALT 10/14/2024 17  10 - 44 U/L Final    eGFR 10/14/2024 >60.0  >60 mL/min/1.73 m^2 Final    Anion Gap 10/14/2024 6 (L)  8 - 16 mmol/L Final    Sed Rate 10/14/2024 9  0 - 10 mm/Hr Final    CRP 10/14/2024 0.3  0.0 - 8.2 mg/L Final   Telephone on 07/22/2024   Component Date Value Ref Range Status    Hemoglobin A1C 07/22/2024 6.4 (H)  <5.7 % of total Hgb Final   Lab Visit on 07/17/2024   Component Date Value Ref Range Status    WBC 07/17/2024 4.98  3.90 - 12.70 K/uL Final    RBC 07/17/2024 4.38 (L)  4.60  - 6.20 M/uL Final    Hemoglobin 07/17/2024 13.5 (L)  14.0 - 18.0 g/dL Final    Hematocrit 07/17/2024 40.8  40.0 - 54.0 % Final    MCV 07/17/2024 93  82 - 98 fL Final    MCH 07/17/2024 30.8  27.0 - 31.0 pg Final    MCHC 07/17/2024 33.1  32.0 - 36.0 g/dL Final    RDW 07/17/2024 13.7  11.5 - 14.5 % Final    Platelets 07/17/2024 226  150 - 450 K/uL Final    MPV 07/17/2024 10.2  9.2 - 12.9 fL Final    Immature Granulocytes 07/17/2024 0.2  0.0 - 0.5 % Final    Gran # (ANC) 07/17/2024 3.0  1.8 - 7.7 K/uL Final    Immature Grans (Abs) 07/17/2024 0.01  0.00 - 0.04 K/uL Final    Lymph # 07/17/2024 1.4  1.0 - 4.8 K/uL Final    Mono # 07/17/2024 0.4  0.3 - 1.0 K/uL Final    Eos # 07/17/2024 0.1  0.0 - 0.5 K/uL Final    Baso # 07/17/2024 0.02  0.00 - 0.20 K/uL Final    nRBC 07/17/2024 0  0 /100 WBC Final    Gran % 07/17/2024 60.3  38.0 - 73.0 % Final    Lymph % 07/17/2024 28.7  18.0 - 48.0 % Final    Mono % 07/17/2024 7.6  4.0 - 15.0 % Final    Eosinophil % 07/17/2024 2.8  0.0 - 8.0 % Final    Basophil % 07/17/2024 0.4  0.0 - 1.9 % Final    Differential Method 07/17/2024 Automated   Final    Sodium 07/17/2024 138  136 - 145 mmol/L Final    Potassium 07/17/2024 5.1  3.5 - 5.1 mmol/L Final    Chloride 07/17/2024 106  95 - 110 mmol/L Final    CO2 07/17/2024 27  23 - 29 mmol/L Final    Glucose 07/17/2024 127 (H)  70 - 110 mg/dL Final    BUN 07/17/2024 10  8 - 23 mg/dL Final    Creatinine 07/17/2024 0.8  0.5 - 1.4 mg/dL Final    Calcium 07/17/2024 9.1  8.7 - 10.5 mg/dL Final    Total Protein 07/17/2024 6.7  6.0 - 8.4 g/dL Final    Albumin 07/17/2024 3.8  3.5 - 5.2 g/dL Final    Total Bilirubin 07/17/2024 1.2 (H)  0.1 - 1.0 mg/dL Final    Alkaline Phosphatase 07/17/2024 55  55 - 135 U/L Final    AST 07/17/2024 14  10 - 40 U/L Final    ALT 07/17/2024 16  10 - 44 U/L Final    eGFR 07/17/2024 >60.0  >60 mL/min/1.73 m^2 Final    Anion Gap 07/17/2024 5 (L)  8 - 16 mmol/L Final    Sed Rate 07/17/2024 4  0 - 10 mm/Hr Final    CRP 07/17/2024  0.5  0.0 - 8.2 mg/L Final    Hepatitis B Surface Ag 07/17/2024 Non-reactive  Non-reactive Final    Hep B Core Total Ab 07/17/2024 Non-reactive  Non-reactive Final    Hepatitis C Ab 07/17/2024 Non-reactive  Non-reactive Final       Past Medical History:   Diagnosis Date    Diabetes mellitus type II     Hyperlipidemia     Hypertension      Social History     Socioeconomic History    Marital status:    Tobacco Use    Smoking status: Never    Smokeless tobacco: Never   Substance and Sexual Activity    Alcohol use: Yes     Comment: occasional    Drug use: No     History reviewed. No pertinent surgical history.  Family History   Problem Relation Name Age of Onset    Blindness Neg Hx      Glaucoma Neg Hx      Macular degeneration Neg Hx         The 10-year CVD risk score (LINO'Paradiseino, et al., 2008) is: 22.6%    Values used to calculate the score:      Age: 74 years      Sex: Male      Diabetic: Yes      Tobacco smoker: No      Systolic Blood Pressure: 114 mmHg      Is BP treated: No      HDL Cholesterol: 41 mg/dL      Total Cholesterol: 118 mg/dL    All of your core healthy metrics are met.      Review of patient's allergies indicates:   Allergen Reactions    Parafon forte Nausea Only       Current Outpatient Medications:     adalimumab-adaz (HYRIMOZ,CF, PEN) 40 mg/0.4 mL PnIj, Inject 40 mg into the skin every 14 (fourteen) days., Disp: 6 pen , Rfl: 3    aspirin (ECOTRIN) 81 MG EC tablet, Take 81 mg by mouth once daily., Disp: , Rfl:     atorvastatin (LIPITOR) 40 MG tablet, Take 1 tablet (40 mg total) by mouth once daily., Disp: 90 tablet, Rfl: 3    blood sugar diagnostic (ACCU-CHEK CJ PLUS TEST STRP) Strp, 1 strip by Subdermal route Daily., Disp: 100 strip, Rfl: 4    coenzyme Q10 10 mg capsule, Take 10 mg by mouth once daily., Disp: , Rfl:     fish oil-omega-3 fatty acids 300-1,000 mg capsule, Take 2 g by mouth once daily., Disp: , Rfl:     folic acid (FOLVITE) 1 MG tablet, Take 1 tablet (1,000 mcg total) by  "mouth once daily., Disp: 90 tablet, Rfl: 3    glipizide-metformin (METAGLIP) 5-500 mg per tablet, 1 tablet in the am, 2 tablets in the pm., Disp: 270 tablet, Rfl: 1    lancets (ACCU-CHEK FASTCLIX) Misc, 1 Device by Misc.(Non-Drug; Combo Route) route Daily., Disp: 100 each, Rfl: 4    latanoprost 0.005 % ophthalmic solution, INSTILL 1 DROP INTO BOTH EYES IN THE EVENING, Disp: 7.5 mL, Rfl: 4    methocarbamoL (ROBAXIN) 750 MG Tab, Take 1 tablet (750 mg total) by mouth 3 (three) times daily as needed (Muscle cramps)., Disp: 90 tablet, Rfl: 1    methotrexate 2.5 MG Tab, Take 7 tablets (17.5 mg total) by mouth every 7 days., Disp: 84 tablet, Rfl: 1    MILK THISTLE ORAL, Take 250 mg by mouth once daily. , Disp: , Rfl:     timolol maleate 0.5% (TIMOPTIC) 0.5 % Drop, PLACE 1 DROP INTO BOTH EYES ONCE DAILY, Disp: 10 mL, Rfl: 1    fluocinonide-emollient (FLUOCINONIDE-E) 0.05 % Crea, Apply topically 2 (two) times daily. for 7 days, Disp: 60 g, Rfl: 0  No current facility-administered medications for this visit.    Review of Systems        Objective:      Vitals:    01/06/25 0905   BP: (!) 114/58   Pulse: 69   SpO2: 99%   Weight: 73.5 kg (162 lb)   Height: 5' 7" (1.702 m)     Physical Exam  Physical Exam    Constitutional: In no acute distress. Normal appearance. Well-developed. Not ill-appearing.  HENT: Normocephalic. Atraumatic. External ears normal bilaterally. Nose normal. Normal lips. Oropharynx clear.  Eyes: No scleral icterus. Pupils are equal, round, and reactive to light.  Cardiovascular: Normal rate   Pulmonary: Pulmonary effort is normal  Skin: Warm. Dry. Capillary refill takes less than 2 seconds. There is erythema to right and left antecubitals, and this extends to forearms. Consistent with eczema. States it is also found to the inner areas of knees bilaterally.            Assessment:       1. Flexural eczema         Plan:       Flexural eczema  -     fluocinonide-emollient (FLUOCINONIDE-E) 0.05 % Crea; Apply " topically 2 (two) times daily. for 7 days  Dispense: 60 g; Refill: 0  -     dexAMETHasone injection 8 mg      Assessment & Plan    IMPRESSION:  - Diagnosed patient's rash as eczema, likely triggered by an allergen exposure  - Considered potential link between adalimumab injection for rheumatoid arthritis and eczema flare-up, but determined no need to discontinue treatment  - Will administer steroid injection for immediate relief of itching    2 DIABETES MELLITUS WITH OTHER SPECIFIED COMPLICATION:  - Evaluated the patient and determined they are not diabetic.    ARTHRITIS, INVOLVING UNSPECIFIED SITE, UNSPECIFIED WHETHER RHEUMATOID FACTOR PRESENT:  - Continued adalimumab injection for rheumatoid arthritis.  - Noted that this treatment might be related to the patient's eczema.    ECZEMA:  - Evaluated the patient's rash, present for about 2 weeks, and diagnosed it as eczema located on the arms.  - Assessed it as an allergic reaction, possibly due to dust or other environmental factors.  - Explained eczema as an autoimmune condition triggered by allergen exposure.  - Prescribed a topical corticosteroid cream to be applied twice daily on affected areas.  - Administered a corticosteroid injection in the hip for immediate pruritus relief.  - Recommend applying emollients (e.g., Vaseline Intensive Care, Eucerin, Aquaphor, or Working Hands) to affected areas for extra hydration.  - Noted that the patient has been using diphenhydramine cream, liquid, and tablets to treat the rash.    INJECTION:  - Discussed potential side effects of steroid injection, including erythema of the cheeks for a few days, increased energy levels, feeling flushed, and possible diaphoresis (not pyrexia).  - Advised patient not to overexert despite potential energy boost from steroid.    UP:  - Advised follow-up as needed if prescribed topical corticosteroid is ineffective.         Follow up if symptoms worsen or fail to improve.        This note was  generated with the assistance of ambient listening technology. Verbal consent was obtained by the patient and accompanying visitor(s) for the recording of patient appointment to facilitate this note. I attest to having reviewed and edited the generated note for accuracy, though some syntax or spelling errors may persist. Please contact the author of this note for any clarification.      1/6/2025 Marbella Mac

## 2025-01-07 LAB
ALBUMIN/CREAT UR: 8 MG/G CREAT
APPEARANCE UR: CLEAR
BACTERIA #/AREA URNS HPF: NORMAL /HPF
BACTERIA UR CULT: NORMAL
BILIRUB UR QL STRIP: NEGATIVE
CHOLEST SERPL-MCNC: 151 MG/DL
CHOLEST/HDLC SERPL: 3.1 (CALC)
COLOR UR: YELLOW
CREAT UR-MCNC: 80 MG/DL (ref 20–320)
GLUCOSE UR QL STRIP: NEGATIVE
HBA1C MFR BLD: 6.3 % OF TOTAL HGB
HDLC SERPL-MCNC: 48 MG/DL
HGB UR QL STRIP: NEGATIVE
HYALINE CASTS #/AREA URNS LPF: NORMAL /LPF
KETONES UR QL STRIP: NEGATIVE
LDLC SERPL CALC-MCNC: 79 MG/DL (CALC)
LEUKOCYTE ESTERASE UR QL STRIP: NEGATIVE
MICROALBUMIN UR-MCNC: 0.6 MG/DL
NITRITE UR QL STRIP: NEGATIVE
NONHDLC SERPL-MCNC: 103 MG/DL (CALC)
PH UR STRIP: 6 [PH] (ref 5–8)
PROT UR QL STRIP: NEGATIVE
PSA SERPL-MCNC: 1.96 NG/ML
RBC #/AREA URNS HPF: NORMAL /HPF
SERVICE CMNT-IMP: NORMAL
SP GR UR STRIP: 1.02 (ref 1–1.03)
SQUAMOUS #/AREA URNS HPF: NORMAL /HPF
TRIGL SERPL-MCNC: 144 MG/DL
TSH SERPL-ACNC: 2.84 MIU/L (ref 0.4–4.5)
WBC #/AREA URNS HPF: NORMAL /HPF

## 2025-01-13 ENCOUNTER — LAB VISIT (OUTPATIENT)
Dept: LAB | Facility: HOSPITAL | Age: 75
End: 2025-01-13
Attending: STUDENT IN AN ORGANIZED HEALTH CARE EDUCATION/TRAINING PROGRAM
Payer: MEDICARE

## 2025-01-13 DIAGNOSIS — M15.0 PRIMARY OSTEOARTHRITIS INVOLVING MULTIPLE JOINTS: ICD-10-CM

## 2025-01-13 DIAGNOSIS — M05.79 RHEUMATOID ARTHRITIS INVOLVING MULTIPLE SITES WITH POSITIVE RHEUMATOID FACTOR: ICD-10-CM

## 2025-01-13 DIAGNOSIS — Z79.899 DRUG-INDUCED IMMUNODEFICIENCY: ICD-10-CM

## 2025-01-13 DIAGNOSIS — D84.821 DRUG-INDUCED IMMUNODEFICIENCY: ICD-10-CM

## 2025-01-13 DIAGNOSIS — Z79.899 HIGH RISK MEDICATION USE: ICD-10-CM

## 2025-01-13 LAB
ALBUMIN SERPL BCP-MCNC: 3.7 G/DL (ref 3.5–5.2)
ALP SERPL-CCNC: 53 U/L (ref 40–150)
ALT SERPL W/O P-5'-P-CCNC: 18 U/L (ref 10–44)
ANION GAP SERPL CALC-SCNC: 8 MMOL/L (ref 8–16)
AST SERPL-CCNC: 14 U/L (ref 10–40)
BASOPHILS # BLD AUTO: 0.02 K/UL (ref 0–0.2)
BASOPHILS NFR BLD: 0.4 % (ref 0–1.9)
BILIRUB SERPL-MCNC: 0.7 MG/DL (ref 0.1–1)
BUN SERPL-MCNC: 12 MG/DL (ref 8–23)
CALCIUM SERPL-MCNC: 8.8 MG/DL (ref 8.7–10.5)
CHLORIDE SERPL-SCNC: 104 MMOL/L (ref 95–110)
CO2 SERPL-SCNC: 27 MMOL/L (ref 23–29)
CREAT SERPL-MCNC: 0.8 MG/DL (ref 0.5–1.4)
CRP SERPL-MCNC: <0.3 MG/L (ref 0–8.2)
DIFFERENTIAL METHOD BLD: ABNORMAL
EOSINOPHIL # BLD AUTO: 0.4 K/UL (ref 0–0.5)
EOSINOPHIL NFR BLD: 7.9 % (ref 0–8)
ERYTHROCYTE [DISTWIDTH] IN BLOOD BY AUTOMATED COUNT: 13.9 % (ref 11.5–14.5)
ERYTHROCYTE [SEDIMENTATION RATE] IN BLOOD BY WESTERGREN METHOD: 0 MM/HR (ref 0–10)
EST. GFR  (NO RACE VARIABLE): >60 ML/MIN/1.73 M^2
GLUCOSE SERPL-MCNC: 120 MG/DL (ref 70–110)
HCT VFR BLD AUTO: 42.9 % (ref 40–54)
HGB BLD-MCNC: 13.7 G/DL (ref 14–18)
IMM GRANULOCYTES # BLD AUTO: 0.02 K/UL (ref 0–0.04)
IMM GRANULOCYTES NFR BLD AUTO: 0.4 % (ref 0–0.5)
LYMPHOCYTES # BLD AUTO: 1.6 K/UL (ref 1–4.8)
LYMPHOCYTES NFR BLD: 34 % (ref 18–48)
MCH RBC QN AUTO: 30.2 PG (ref 27–31)
MCHC RBC AUTO-ENTMCNC: 31.9 G/DL (ref 32–36)
MCV RBC AUTO: 95 FL (ref 82–98)
MONOCYTES # BLD AUTO: 0.5 K/UL (ref 0.3–1)
MONOCYTES NFR BLD: 10.2 % (ref 4–15)
NEUTROPHILS # BLD AUTO: 2.3 K/UL (ref 1.8–7.7)
NEUTROPHILS NFR BLD: 47.1 % (ref 38–73)
NRBC BLD-RTO: 0 /100 WBC
PLATELET # BLD AUTO: 226 K/UL (ref 150–450)
PMV BLD AUTO: 10 FL (ref 9.2–12.9)
POTASSIUM SERPL-SCNC: 4.1 MMOL/L (ref 3.5–5.1)
PROT SERPL-MCNC: 6.8 G/DL (ref 6–8.4)
RBC # BLD AUTO: 4.54 M/UL (ref 4.6–6.2)
SODIUM SERPL-SCNC: 139 MMOL/L (ref 136–145)
WBC # BLD AUTO: 4.8 K/UL (ref 3.9–12.7)

## 2025-01-13 PROCEDURE — 85651 RBC SED RATE NONAUTOMATED: CPT | Mod: PO | Performed by: STUDENT IN AN ORGANIZED HEALTH CARE EDUCATION/TRAINING PROGRAM

## 2025-01-13 PROCEDURE — 85025 COMPLETE CBC W/AUTO DIFF WBC: CPT | Performed by: STUDENT IN AN ORGANIZED HEALTH CARE EDUCATION/TRAINING PROGRAM

## 2025-01-13 PROCEDURE — 86140 C-REACTIVE PROTEIN: CPT | Performed by: STUDENT IN AN ORGANIZED HEALTH CARE EDUCATION/TRAINING PROGRAM

## 2025-01-13 PROCEDURE — 80053 COMPREHEN METABOLIC PANEL: CPT | Performed by: STUDENT IN AN ORGANIZED HEALTH CARE EDUCATION/TRAINING PROGRAM

## 2025-01-13 PROCEDURE — 36415 COLL VENOUS BLD VENIPUNCTURE: CPT | Mod: PO | Performed by: STUDENT IN AN ORGANIZED HEALTH CARE EDUCATION/TRAINING PROGRAM

## 2025-01-24 NOTE — PROGRESS NOTES
"Subjective:      Patient ID: Kyrie Norton Jr. is a 74 y.o. male.    Chief Complaint: Disease Management    HPI    Rheumatologic History:   - Diagnosis/es:              - seronegative rheumatoid arthritis  - Positive serologies: -  - Negative serologies: RF, CCP  - Infectious screening labs: positive TB gold  (09/2022) negative hepatitis B and C (7/17/24)  - Imaging:              - DEXA scan (8/2022) Normal BMD              - Xray arthritis survey (9/2022) no obvious erosive changes  - Previous Treatments:              - Prednisone              - HCQ 400mg daily: Ineffective  - Current Treatments:               - MTX 12.5mg weekly plus folic acid daily (dose decreased 7/26/24)  - Hyrimoz weekly (6/2023- ; frequency increased 1/27/2025)  - Plaquenil eye exam: No toxicity (4/2022)     Interval History:  He denies red, hot, swollen joints and recent infections, but has reported more stiffness in the right hand that is more noticeable when he is almost due for a dose of adalimumab.     Objective:   BP (!) 154/73 (BP Location: Right arm, Patient Position: Sitting)   Pulse 82   Ht 5' 7" (1.702 m)   Wt 74.7 kg (164 lb 10.9 oz)   BMI 25.79 kg/m²   Physical Exam   Constitutional: normal appearance.   HENT:   Head: Normocephalic and atraumatic.   Cardiovascular: Normal rate, regular rhythm and normal heart sounds.   Pulmonary/Chest: Effort normal and breath sounds normal.   Musculoskeletal:      Comments: + Heberden's and Letha's nodes  Unable to make a full fist with the right hand  No synovitis, dactylitis, enthesitis, effusions     Neurological: He is alert.   Skin: Skin is warm and dry. No rash noted.   No skin thickening, telangiectasias, calcinosis, psoriasiform lesions, lupoid lesions        6/5/2023 9/8/2023 1/26/2024   Tender (JIMENEZ-28) 4 / 28  0 / 28  0 / 28    Swollen (JIMENEZ-28) 0 / 28  0 / 28  0 / 28    Provider Global -- 20 / 100 10 / 100   Patient Global 50 / 100 20 / 100 10 / 100   ESR 5 mm/hr 0 mm/hr 2 " mm/hr   CRP 1.3 mg/L -- 0.4 mg/L   JIMENEZ-28 (ESR) 2.95 (Low disease activity) -- 0.63 (Remission)   JIMENEZ-28 (CRP) 3.08 (Low disease activity) -- 1.22 (Remission)   CDAI Score -- 4  2      Labs (1/13/2025)  CBC HGB 13.7, WBC, PLT WNL   CMP CR, AST, ALT WNL   ESR CRP WNL     Assessment:     1. Rheumatoid arthritis involving multiple sites with positive rheumatoid factor    2. Drug-induced immunodeficiency    3. High risk medication use    4. Methotrexate, long term, current use      This is a 74-year-old man with past medical history of HLD, T2DM, positive quantiferon with borderline T-spot but no symptoms s/p prophylactic therapy, and seronegative rheumatoid arthritis on methotrexate 12.5 mg weekly, and Hyrimoz biweekly. He denies red, hot, swollen joints and recent infections, but has reported more stiffness in the right hand that is more noticeable when he is almost due for a dose of adalimumab. Try increasing Hyrimoz frequency to weekly.     Plan:     Problem List Items Addressed This Visit          Immunology/Multi System    Rheumatoid arthritis - Primary    Relevant Medications    adalimumab-adaz (HYRIMOZ,CF, PEN) 40 mg/0.4 mL PnIj    methotrexate 2.5 MG Tab    Other Relevant Orders    C-Reactive Protein    CBC Auto Differential    Creatinine, Serum    ALT (SGPT)    AST (SGOT)    Sedimentation rate    Hepatitis B surface antigen    HBcAB    Hepatitis B surface antibody    Hepatitis C antibody    Quantiferon Gold TB    Drug-induced immunodeficiency       Palliative Care    High risk medication use     Other Visit Diagnoses       Methotrexate, long term, current use        Relevant Medications    folic acid (FOLVITE) 1 MG tablet    Other Relevant Orders    C-Reactive Protein    CBC Auto Differential    Creatinine, Serum    ALT (SGPT)    AST (SGOT)    Sedimentation rate    Hepatitis B surface antigen    HBcAB    Hepatitis B surface antibody    Hepatitis C antibody    Quantiferon Gold TB          1.) Seronegative  rheumatoid arthritis  2.) high risk medication use  3.) Drug induced immunodeficiency  - Humira weekly  - MTX 12.5mg weekly plus folic acid daily   - Robaxin PRN for cramps  - CBC, CMP, ESR, CRP every 12 weeks  - Pre-DMARD labs yearly  - DEXA scan due for repeat  - Immunizations: Zoster live (2015), flu (1/22), PCV13 (2020), PCV20 (9/2022)    Follow up in 4 months    30 minutes of total time spent on the encounter, which includes face to face time and non-face to face time preparing to see the patient (eg, review of tests), Obtaining and/or reviewing separately obtained history, Documenting clinical information in the electronic or other health record, Independently interpreting results (not separately reported) and communicating results to the patient/family/caregiver, or Care coordination (not separately reported).     This note was prepared with [employer] voice recognition transcription software. Garbled syntax, mangled pronouns, and other bizarre constructions may be attributed to that software system       Nitza Ruzi M.D.  Rheumatology Dept  [place], [place]

## 2025-01-27 ENCOUNTER — OFFICE VISIT (OUTPATIENT)
Dept: RHEUMATOLOGY | Facility: CLINIC | Age: 75
End: 2025-01-27
Payer: MEDICARE

## 2025-01-27 VITALS
BODY MASS INDEX: 25.85 KG/M2 | WEIGHT: 164.69 LBS | HEIGHT: 67 IN | SYSTOLIC BLOOD PRESSURE: 154 MMHG | HEART RATE: 82 BPM | DIASTOLIC BLOOD PRESSURE: 73 MMHG

## 2025-01-27 DIAGNOSIS — M05.79 RHEUMATOID ARTHRITIS INVOLVING MULTIPLE SITES WITH POSITIVE RHEUMATOID FACTOR: ICD-10-CM

## 2025-01-27 DIAGNOSIS — Z79.899 DRUG-INDUCED IMMUNODEFICIENCY: ICD-10-CM

## 2025-01-27 DIAGNOSIS — M15.0 PRIMARY OSTEOARTHRITIS INVOLVING MULTIPLE JOINTS: Primary | ICD-10-CM

## 2025-01-27 DIAGNOSIS — M85.89 OTHER SPECIFIED DISORDERS OF BONE DENSITY AND STRUCTURE, MULTIPLE SITES: ICD-10-CM

## 2025-01-27 DIAGNOSIS — M05.79 RHEUMATOID ARTHRITIS INVOLVING MULTIPLE SITES WITH POSITIVE RHEUMATOID FACTOR: Primary | ICD-10-CM

## 2025-01-27 DIAGNOSIS — Z79.899 HIGH RISK MEDICATION USE: ICD-10-CM

## 2025-01-27 DIAGNOSIS — Z79.631 METHOTREXATE, LONG TERM, CURRENT USE: ICD-10-CM

## 2025-01-27 DIAGNOSIS — D84.821 DRUG-INDUCED IMMUNODEFICIENCY: ICD-10-CM

## 2025-01-27 PROCEDURE — 3066F NEPHROPATHY DOC TX: CPT | Mod: CPTII,S$GLB,, | Performed by: STUDENT IN AN ORGANIZED HEALTH CARE EDUCATION/TRAINING PROGRAM

## 2025-01-27 PROCEDURE — 1101F PT FALLS ASSESS-DOCD LE1/YR: CPT | Mod: CPTII,S$GLB,, | Performed by: STUDENT IN AN ORGANIZED HEALTH CARE EDUCATION/TRAINING PROGRAM

## 2025-01-27 PROCEDURE — 1160F RVW MEDS BY RX/DR IN RCRD: CPT | Mod: CPTII,S$GLB,, | Performed by: STUDENT IN AN ORGANIZED HEALTH CARE EDUCATION/TRAINING PROGRAM

## 2025-01-27 PROCEDURE — 1126F AMNT PAIN NOTED NONE PRSNT: CPT | Mod: CPTII,S$GLB,, | Performed by: STUDENT IN AN ORGANIZED HEALTH CARE EDUCATION/TRAINING PROGRAM

## 2025-01-27 PROCEDURE — 3077F SYST BP >= 140 MM HG: CPT | Mod: CPTII,S$GLB,, | Performed by: STUDENT IN AN ORGANIZED HEALTH CARE EDUCATION/TRAINING PROGRAM

## 2025-01-27 PROCEDURE — 3008F BODY MASS INDEX DOCD: CPT | Mod: CPTII,S$GLB,, | Performed by: STUDENT IN AN ORGANIZED HEALTH CARE EDUCATION/TRAINING PROGRAM

## 2025-01-27 PROCEDURE — 3044F HG A1C LEVEL LT 7.0%: CPT | Mod: CPTII,S$GLB,, | Performed by: STUDENT IN AN ORGANIZED HEALTH CARE EDUCATION/TRAINING PROGRAM

## 2025-01-27 PROCEDURE — 3078F DIAST BP <80 MM HG: CPT | Mod: CPTII,S$GLB,, | Performed by: STUDENT IN AN ORGANIZED HEALTH CARE EDUCATION/TRAINING PROGRAM

## 2025-01-27 PROCEDURE — 3288F FALL RISK ASSESSMENT DOCD: CPT | Mod: CPTII,S$GLB,, | Performed by: STUDENT IN AN ORGANIZED HEALTH CARE EDUCATION/TRAINING PROGRAM

## 2025-01-27 PROCEDURE — 1159F MED LIST DOCD IN RCRD: CPT | Mod: CPTII,S$GLB,, | Performed by: STUDENT IN AN ORGANIZED HEALTH CARE EDUCATION/TRAINING PROGRAM

## 2025-01-27 PROCEDURE — 3061F NEG MICROALBUMINURIA REV: CPT | Mod: CPTII,S$GLB,, | Performed by: STUDENT IN AN ORGANIZED HEALTH CARE EDUCATION/TRAINING PROGRAM

## 2025-01-27 PROCEDURE — 99999 PR PBB SHADOW E&M-EST. PATIENT-LVL III: CPT | Mod: PBBFAC,,, | Performed by: STUDENT IN AN ORGANIZED HEALTH CARE EDUCATION/TRAINING PROGRAM

## 2025-01-27 PROCEDURE — 99215 OFFICE O/P EST HI 40 MIN: CPT | Mod: S$GLB,,, | Performed by: STUDENT IN AN ORGANIZED HEALTH CARE EDUCATION/TRAINING PROGRAM

## 2025-01-27 RX ORDER — FOLIC ACID 1 MG/1
1000 TABLET ORAL DAILY
Qty: 90 TABLET | Refills: 3 | Status: SHIPPED | OUTPATIENT
Start: 2025-01-27 | End: 2026-01-27

## 2025-01-27 RX ORDER — METHOTREXATE 2.5 MG/1
12.5 TABLET ORAL
Qty: 60 TABLET | Refills: 3 | Status: SHIPPED | OUTPATIENT
Start: 2025-01-27 | End: 2025-01-27

## 2025-01-27 RX ORDER — METHOTREXATE 2.5 MG/1
12.5 TABLET ORAL
Qty: 60 TABLET | Refills: 3 | Status: SHIPPED | OUTPATIENT
Start: 2025-01-27 | End: 2026-01-27

## 2025-01-27 RX ORDER — FOLIC ACID 1 MG/1
1000 TABLET ORAL DAILY
Qty: 90 TABLET | Refills: 3 | Status: SHIPPED | OUTPATIENT
Start: 2025-01-27 | End: 2025-01-27

## 2025-01-27 RX ORDER — ADALIMUMAB-ADAZ 40 MG/.4ML
40 INJECTION, SOLUTION SUBCUTANEOUS
Qty: 12 PEN | Refills: 3 | Status: ACTIVE | OUTPATIENT
Start: 2025-01-27 | End: 2026-01-27

## 2025-01-27 ASSESSMENT — ROUTINE ASSESSMENT OF PATIENT INDEX DATA (RAPID3)
PSYCHOLOGICAL DISTRESS SCORE: 0
MDHAQ FUNCTION SCORE: 0.1
FATIGUE SCORE: 2.2
PATIENT GLOBAL ASSESSMENT SCORE: 5
TOTAL RAPID3 SCORE: 1.78
PAIN SCORE: 0

## 2025-01-30 ENCOUNTER — OFFICE VISIT (OUTPATIENT)
Dept: FAMILY MEDICINE | Facility: CLINIC | Age: 75
End: 2025-01-30
Payer: MEDICARE

## 2025-01-30 ENCOUNTER — TELEPHONE (OUTPATIENT)
Dept: FAMILY MEDICINE | Facility: CLINIC | Age: 75
End: 2025-01-30

## 2025-01-30 VITALS
HEART RATE: 75 BPM | HEIGHT: 67 IN | RESPIRATION RATE: 18 BRPM | DIASTOLIC BLOOD PRESSURE: 60 MMHG | BODY MASS INDEX: 25.58 KG/M2 | SYSTOLIC BLOOD PRESSURE: 120 MMHG | WEIGHT: 163 LBS | OXYGEN SATURATION: 98 %

## 2025-01-30 DIAGNOSIS — E78.5 HYPERLIPIDEMIA ASSOCIATED WITH TYPE 2 DIABETES MELLITUS: ICD-10-CM

## 2025-01-30 DIAGNOSIS — Z12.11 SCREENING FOR COLON CANCER: Primary | ICD-10-CM

## 2025-01-30 DIAGNOSIS — E11.9 TYPE 2 DIABETES MELLITUS WITHOUT RETINOPATHY: ICD-10-CM

## 2025-01-30 DIAGNOSIS — E11.69 HYPERLIPIDEMIA ASSOCIATED WITH TYPE 2 DIABETES MELLITUS: ICD-10-CM

## 2025-01-30 PROCEDURE — 3074F SYST BP LT 130 MM HG: CPT | Mod: CPTII,S$GLB,, | Performed by: PHYSICIAN ASSISTANT

## 2025-01-30 PROCEDURE — 3044F HG A1C LEVEL LT 7.0%: CPT | Mod: CPTII,S$GLB,, | Performed by: PHYSICIAN ASSISTANT

## 2025-01-30 PROCEDURE — 1101F PT FALLS ASSESS-DOCD LE1/YR: CPT | Mod: CPTII,S$GLB,, | Performed by: PHYSICIAN ASSISTANT

## 2025-01-30 PROCEDURE — 3288F FALL RISK ASSESSMENT DOCD: CPT | Mod: CPTII,S$GLB,, | Performed by: PHYSICIAN ASSISTANT

## 2025-01-30 PROCEDURE — 1126F AMNT PAIN NOTED NONE PRSNT: CPT | Mod: CPTII,S$GLB,, | Performed by: PHYSICIAN ASSISTANT

## 2025-01-30 PROCEDURE — 99214 OFFICE O/P EST MOD 30 MIN: CPT | Mod: S$GLB,,, | Performed by: PHYSICIAN ASSISTANT

## 2025-01-30 PROCEDURE — 1159F MED LIST DOCD IN RCRD: CPT | Mod: CPTII,S$GLB,, | Performed by: PHYSICIAN ASSISTANT

## 2025-01-30 PROCEDURE — 3066F NEPHROPATHY DOC TX: CPT | Mod: CPTII,S$GLB,, | Performed by: PHYSICIAN ASSISTANT

## 2025-01-30 PROCEDURE — 3061F NEG MICROALBUMINURIA REV: CPT | Mod: CPTII,S$GLB,, | Performed by: PHYSICIAN ASSISTANT

## 2025-01-30 PROCEDURE — 3008F BODY MASS INDEX DOCD: CPT | Mod: CPTII,S$GLB,, | Performed by: PHYSICIAN ASSISTANT

## 2025-01-30 PROCEDURE — 3078F DIAST BP <80 MM HG: CPT | Mod: CPTII,S$GLB,, | Performed by: PHYSICIAN ASSISTANT

## 2025-01-30 RX ORDER — GLIPIZIDE AND METFORMIN HCL 5; 500 MG/1; MG/1
TABLET, FILM COATED ORAL
Qty: 270 TABLET | Refills: 1 | Status: SHIPPED | OUTPATIENT
Start: 2025-01-30

## 2025-01-30 RX ORDER — ATORVASTATIN CALCIUM 40 MG/1
40 TABLET, FILM COATED ORAL DAILY
Qty: 90 TABLET | Refills: 3 | Status: SHIPPED | OUTPATIENT
Start: 2025-01-30 | End: 2026-01-30

## 2025-01-30 NOTE — PROGRESS NOTES
SUBJECTIVE:    Patient ID: Kyrie Norton Jr. is a 74 y.o. male.    Chief Complaint: Follow-up (No bottles// refills needed// pt states he have no complaints today//pt refused flu vaccine //agrees to foot exam and colon cancer screening prefers cologuard //last A1C was 01/06=6.3)    History of Present Illness    CHIEF COMPLAINT:  Kyrie presents today for routine follow up.    OCULAR:  He requires glasses for driving and reading charts. He has been diagnosed with cataracts by an ophthalmologist and experiences glare and halos at night. He is contemplating cataract surgery.    DERMATOLOGIC:  He experienced an eczema flare in early January attributed to poison ivy exposure while weeding, which has resolved with steroid treatment. He reports dryness on feet attributed to cold weather exposure.    IMMUNIZATIONS:  He received Zostavax vaccine in 2015.    LABS:  A1C was 6.3. Cholesterol and PSA were within normal limits. Urinalysis showed no protein.      ROS:  General: -fever, -chills, -fatigue, -weight gain, -weight loss  Eyes: -vision changes, -redness, -discharge, -eye pain  ENT: -ear pain, -nasal congestion, -sore throat  Cardiovascular: -chest pain, -palpitations, -lower extremity edema  Respiratory: -cough, -shortness of breath  Gastrointestinal: -abdominal pain, -nausea, -vomiting, -diarrhea, -constipation, -blood in stool  Genitourinary: -dysuria, -hematuria, -frequency  Musculoskeletal: -joint pain, -muscle pain  Skin: +rash, -lesion, +dry skin  Neurological: -headache, -dizziness, -numbness, -tingling  Psychiatric: -anxiety, -depression, -sleep difficulty         Lab Visit on 01/13/2025   Component Date Value Ref Range Status    WBC 01/13/2025 4.80  3.90 - 12.70 K/uL Final    RBC 01/13/2025 4.54 (L)  4.60 - 6.20 M/uL Final    Hemoglobin 01/13/2025 13.7 (L)  14.0 - 18.0 g/dL Final    Hematocrit 01/13/2025 42.9  40.0 - 54.0 % Final    MCV 01/13/2025 95  82 - 98 fL Final    MCH 01/13/2025 30.2  27.0 - 31.0 pg  Final    MCHC 01/13/2025 31.9 (L)  32.0 - 36.0 g/dL Final    RDW 01/13/2025 13.9  11.5 - 14.5 % Final    Platelets 01/13/2025 226  150 - 450 K/uL Final    MPV 01/13/2025 10.0  9.2 - 12.9 fL Final    Immature Granulocytes 01/13/2025 0.4  0.0 - 0.5 % Final    Gran # (ANC) 01/13/2025 2.3  1.8 - 7.7 K/uL Final    Immature Grans (Abs) 01/13/2025 0.02  0.00 - 0.04 K/uL Final    Lymph # 01/13/2025 1.6  1.0 - 4.8 K/uL Final    Mono # 01/13/2025 0.5  0.3 - 1.0 K/uL Final    Eos # 01/13/2025 0.4  0.0 - 0.5 K/uL Final    Baso # 01/13/2025 0.02  0.00 - 0.20 K/uL Final    nRBC 01/13/2025 0  0 /100 WBC Final    Gran % 01/13/2025 47.1  38.0 - 73.0 % Final    Lymph % 01/13/2025 34.0  18.0 - 48.0 % Final    Mono % 01/13/2025 10.2  4.0 - 15.0 % Final    Eosinophil % 01/13/2025 7.9  0.0 - 8.0 % Final    Basophil % 01/13/2025 0.4  0.0 - 1.9 % Final    Differential Method 01/13/2025 Automated   Final    Sodium 01/13/2025 139  136 - 145 mmol/L Final    Potassium 01/13/2025 4.1  3.5 - 5.1 mmol/L Final    Chloride 01/13/2025 104  95 - 110 mmol/L Final    CO2 01/13/2025 27  23 - 29 mmol/L Final    Glucose 01/13/2025 120 (H)  70 - 110 mg/dL Final    BUN 01/13/2025 12  8 - 23 mg/dL Final    Creatinine 01/13/2025 0.8  0.5 - 1.4 mg/dL Final    Calcium 01/13/2025 8.8  8.7 - 10.5 mg/dL Final    Total Protein 01/13/2025 6.8  6.0 - 8.4 g/dL Final    Albumin 01/13/2025 3.7  3.5 - 5.2 g/dL Final    Total Bilirubin 01/13/2025 0.7  0.1 - 1.0 mg/dL Final    Alkaline Phosphatase 01/13/2025 53  40 - 150 U/L Final    AST 01/13/2025 14  10 - 40 U/L Final    ALT 01/13/2025 18  10 - 44 U/L Final    eGFR 01/13/2025 >60.0  >60 mL/min/1.73 m^2 Final    Anion Gap 01/13/2025 8  8 - 16 mmol/L Final    Sed Rate 01/13/2025 0  0 - 10 mm/Hr Final    CRP 01/13/2025 <0.3  0.0 - 8.2 mg/L Final   Telephone on 01/06/2025   Component Date Value Ref Range Status    Cholesterol 01/06/2025 151  <200 mg/dL Final    HDL 01/06/2025 48  > OR = 40 mg/dL Final    Triglycerides  01/06/2025 144  <150 mg/dL Final    LDL Cholesterol 01/06/2025 79  mg/dL (calc) Final    HDL/Cholesterol Ratio 01/06/2025 3.1  <5.0 (calc) Final    Non HDL Chol. (LDL+VLDL) 01/06/2025 103  <130 mg/dL (calc) Final    TSH w/reflex to FT4 01/06/2025 2.84  0.40 - 4.50 mIU/L Final    Color, UA 01/06/2025 YELLOW  YELLOW Final    Appearance, UA 01/06/2025 CLEAR  CLEAR Final    Specific Gravity, UA 01/06/2025 1.016  1.001 - 1.035 Final    pH, UA 01/06/2025 6.0  5.0 - 8.0 Final    Glucose, UA 01/06/2025 NEGATIVE  NEGATIVE Final    Bilirubin, UA 01/06/2025 NEGATIVE  NEGATIVE Final    Ketones, UA 01/06/2025 NEGATIVE  NEGATIVE Final    Occult Blood UA 01/06/2025 NEGATIVE  NEGATIVE Final    Protein, UA 01/06/2025 NEGATIVE  NEGATIVE Final    Nitrite, UA 01/06/2025 NEGATIVE  NEGATIVE Final    Leukocytes, UA 01/06/2025 NEGATIVE  NEGATIVE Final    WBC Casts, UA 01/06/2025 NONE SEEN  < OR = 5 /HPF Final    RBC Casts, UA 01/06/2025 NONE SEEN  < OR = 2 /HPF Final    Squam Epithel, UA 01/06/2025 NONE SEEN  < OR = 5 /HPF Final    Bacteria, UA 01/06/2025 NONE SEEN  NONE SEEN /HPF Final    Hyaline Casts, UA 01/06/2025 NONE SEEN  NONE SEEN /LPF Final    Service Cmt: 01/06/2025 SEE COMMENT   Final    Reflexive Urine Culture 01/06/2025 SEE COMMENT   Final    Creatinine, Urine 01/06/2025 80  20 - 320 mg/dL Final    Microalb, Ur 01/06/2025 0.6  See Note: mg/dL Final    Microalb/Creat Ratio 01/06/2025 8  <30 mg/g creat Final    Hemoglobin A1C 01/06/2025 6.3 (H)  <5.7 % of total Hgb Final    PROSTATE SPECIFIC ANTIGEN, SCR - Q* 01/06/2025 1.96  < OR = 4.00 ng/mL Final   Patient Outreach on 11/12/2024   Component Date Value Ref Range Status    Left Eye DM Retinopathy 11/06/2024 Negative   Final    Right Eye DM Retinopathy 11/06/2024 Negative   Final   Lab Visit on 10/14/2024   Component Date Value Ref Range Status    WBC 10/14/2024 4.01  3.90 - 12.70 K/uL Final    RBC 10/14/2024 4.24 (L)  4.60 - 6.20 M/uL Final    Hemoglobin 10/14/2024 13.0 (L)   14.0 - 18.0 g/dL Final    Hematocrit 10/14/2024 40.6  40.0 - 54.0 % Final    MCV 10/14/2024 96  82 - 98 fL Final    MCH 10/14/2024 30.7  27.0 - 31.0 pg Final    MCHC 10/14/2024 32.0  32.0 - 36.0 g/dL Final    RDW 10/14/2024 14.3  11.5 - 14.5 % Final    Platelets 10/14/2024 225  150 - 450 K/uL Final    MPV 10/14/2024 9.6  9.2 - 12.9 fL Final    Immature Granulocytes 10/14/2024 0.2  0.0 - 0.5 % Final    Gran # (ANC) 10/14/2024 1.6 (L)  1.8 - 7.7 K/uL Final    Immature Grans (Abs) 10/14/2024 0.01  0.00 - 0.04 K/uL Final    Lymph # 10/14/2024 1.5  1.0 - 4.8 K/uL Final    Mono # 10/14/2024 0.5  0.3 - 1.0 K/uL Final    Eos # 10/14/2024 0.3  0.0 - 0.5 K/uL Final    Baso # 10/14/2024 0.03  0.00 - 0.20 K/uL Final    nRBC 10/14/2024 0  0 /100 WBC Final    Gran % 10/14/2024 40.2  38.0 - 73.0 % Final    Lymph % 10/14/2024 38.4  18.0 - 48.0 % Final    Mono % 10/14/2024 12.0  4.0 - 15.0 % Final    Eosinophil % 10/14/2024 8.5 (H)  0.0 - 8.0 % Final    Basophil % 10/14/2024 0.7  0.0 - 1.9 % Final    Differential Method 10/14/2024 Automated   Final    Sodium 10/14/2024 138  136 - 145 mmol/L Final    Potassium 10/14/2024 4.4  3.5 - 5.1 mmol/L Final    Chloride 10/14/2024 107  95 - 110 mmol/L Final    CO2 10/14/2024 25  23 - 29 mmol/L Final    Glucose 10/14/2024 101  70 - 110 mg/dL Final    BUN 10/14/2024 15  8 - 23 mg/dL Final    Creatinine 10/14/2024 0.8  0.5 - 1.4 mg/dL Final    Calcium 10/14/2024 9.0  8.7 - 10.5 mg/dL Final    Total Protein 10/14/2024 6.5  6.0 - 8.4 g/dL Final    Albumin 10/14/2024 3.7  3.5 - 5.2 g/dL Final    Total Bilirubin 10/14/2024 0.7  0.1 - 1.0 mg/dL Final    Alkaline Phosphatase 10/14/2024 50 (L)  55 - 135 U/L Final    AST 10/14/2024 19  10 - 40 U/L Final    ALT 10/14/2024 17  10 - 44 U/L Final    eGFR 10/14/2024 >60.0  >60 mL/min/1.73 m^2 Final    Anion Gap 10/14/2024 6 (L)  8 - 16 mmol/L Final    Sed Rate 10/14/2024 9  0 - 10 mm/Hr Final    CRP 10/14/2024 0.3  0.0 - 8.2 mg/L Final       Past Medical  History:   Diagnosis Date    Diabetes mellitus type II     Hyperlipidemia     Hypertension      History reviewed. No pertinent surgical history.  Family History   Problem Relation Name Age of Onset    Blindness Neg Hx      Glaucoma Neg Hx      Macular degeneration Neg Hx         Marital Status:   Alcohol History:  reports current alcohol use.  Tobacco History:  reports that he has never smoked. He has never used smokeless tobacco.  Drug History:  reports no history of drug use.    Review of patient's allergies indicates:   Allergen Reactions    Parafon forte Nausea Only       Current Outpatient Medications:     aspirin (ECOTRIN) 81 MG EC tablet, Take 81 mg by mouth once daily., Disp: , Rfl:     blood sugar diagnostic (ACCU-CHEK CJ PLUS TEST STRP) Strp, 1 strip by Subdermal route Daily., Disp: 100 strip, Rfl: 4    coenzyme Q10 10 mg capsule, Take 10 mg by mouth once daily., Disp: , Rfl:     fish oil-omega-3 fatty acids 300-1,000 mg capsule, Take 2 g by mouth once daily., Disp: , Rfl:     fluocinonide-emollient (FLUOCINONIDE-E) 0.05 % Crea, Apply topically 2 (two) times daily. for 7 days, Disp: 60 g, Rfl: 0    folic acid (FOLVITE) 1 MG tablet, Take 1 tablet (1,000 mcg total) by mouth once daily., Disp: 90 tablet, Rfl: 3    lancets (ACCU-CHEK FASTCLIX) Misc, 1 Device by Misc.(Non-Drug; Combo Route) route Daily., Disp: 100 each, Rfl: 4    latanoprost 0.005 % ophthalmic solution, INSTILL 1 DROP INTO BOTH EYES IN THE EVENING, Disp: 7.5 mL, Rfl: 4    methocarbamoL (ROBAXIN) 750 MG Tab, Take 1 tablet (750 mg total) by mouth 3 (three) times daily as needed (Muscle cramps)., Disp: 90 tablet, Rfl: 1    methotrexate 2.5 MG Tab, Take 5 tablets (12.5 mg total) by mouth every 7 days., Disp: 60 tablet, Rfl: 3    MILK THISTLE ORAL, Take 250 mg by mouth once daily. , Disp: , Rfl:     timolol maleate 0.5% (TIMOPTIC) 0.5 % Drop, PLACE 1 DROP INTO BOTH EYES ONCE DAILY, Disp: 10 mL, Rfl: 1    adalimumab-adaz (HYRIMOZ,CF, PEN)  "40 mg/0.4 mL PnIj, Inject 0.4 mLs (40 mg total) into the skin every 7 days., Disp: 12 pen , Rfl: 3    atorvastatin (LIPITOR) 40 MG tablet, Take 1 tablet (40 mg total) by mouth once daily., Disp: 90 tablet, Rfl: 3    glipizide-metformin (METAGLIP) 5-500 mg per tablet, 1 tablet in the am, 2 tablets in the pm., Disp: 270 tablet, Rfl: 1    Objective:      Vitals:    01/30/25 0712   BP: 120/60   Pulse: 75   Resp: 18   SpO2: 98%   Weight: 73.9 kg (163 lb)   Height: 5' 7" (1.702 m)     Physical Exam    General: No acute distress. Well-developed. Well-nourished.  Eyes: EOMI. Sclerae anicteric.  HENT: Normocephalic. Atraumatic. Nares patent. Moist oral mucosa.  Ears: Bilateral TMs clear. Bilateral EACs clear.  Cardiovascular: Regular rate. Regular rhythm. No murmurs. No rubs. No gallops. Normal S1, S2.  Respiratory: Normal respiratory effort. Clear to auscultation bilaterally. No rales. No rhonchi. No wheezing.  Abdomen: Soft. Non-tender. Non-distended. Normoactive bowel sounds.  Musculoskeletal: No  obvious deformity.  Extremities: No lower extremity edema.  Neurological: Alert & oriented x3. No slurred speech. Normal gait.  Psychiatric: Normal mood. Normal affect. Good insight. Good judgment.  Skin: Warm. Dry. No rash.  Diabetic Foot: All normal.         Assessment:       Assessment & Plan    - Reviewed recent labs results: A1C 6.3, favorable cholesterol levels, normal PSA, no protein in urine  - Evaluated recent eczema-like rash, likely due to environmental irritant exposure  - Assessed need for cataract surgery based on reported vision difficulties and nighttime glare  - Evaluated foot health, noting minor dryness likely due to weather conditions  - Recommend updated shingles vaccine series due to superior efficacy compared to previous Zostavax administration in 2015    TYPE 2 DIABETES MELLITUS WITH OTHER SPECIFIED COMPLICATION:  - A1C level well-controlled at 6.3.  - Foot exam revealed good pulses and intact " sensation.  - Recent labs showed great cholesterol levels.  - Continued current diabetes medications (Glipizide and Metformin combination) and refilled Lipitor for hyperlipidemia management.  - Scheduled follow up in 6 months (July) for A1C recheck.    CATARACTS:  - Evaluated patient's visual symptoms, including need for glasses while driving, difficulty reading charts without glasses, and experience of glare and halos at night.  - Confirmed presence of cataracts through ophthalmologist's exam.  - Discussed potential benefits of cataract surgery and referred the patient to Dr. Eugene at [facility] for consultation.    SHINGLES VACCINATION:  - Reviewed patient's immunization history, noting Zostavax shingles vaccine received in 2015.  - Explained difference between old (Zostavax) and new shingles vaccines, emphasizing improved efficacy (94% vs 40%).  - Recommend new shingles vaccine, discussed administration schedule (2 shots given 2-6 months apart), and provided instructions for obtaining it.    RSV VACCINATION:  - Recommend RSV vaccine and provided instructions for obtaining it.    ECZEMA / CONTACT DERMATITIS:  - Monitored patient's history of eczema flare or possible irritant contact dermatitis from early January.  - Evaluated current skin condition and confirmed resolution, noting effectiveness of previous steroid treatment.    COLORECTAL CANCER SCREENING:  - Reviewed patient's history of previous Cologuard test 3-4 years ago.  - Ordered new Cologuard test for colorectal cancer screening.       Plan:       Screening for colon cancer  -     Cologuard Screening (Multitarget Stool DNA); Future; Expected date: 01/30/2025    Hyperlipidemia associated with type 2 diabetes mellitus  Comments:  Labs reviewed and remain stable, refills today. NO changes from pcp standpoint.  Orders:  -     atorvastatin (LIPITOR) 40 MG tablet; Take 1 tablet (40 mg total) by mouth once daily.  Dispense: 90 tablet; Refill:  3    Type 2 diabetes mellitus without retinopathy  Comments:  A1c at 6.3% and well managed at this time. will continue as is.  Orders:  -     Foot Exam Performed  -     atorvastatin (LIPITOR) 40 MG tablet; Take 1 tablet (40 mg total) by mouth once daily.  Dispense: 90 tablet; Refill: 3  -     glipizide-metformin (METAGLIP) 5-500 mg per tablet; 1 tablet in the am, 2 tablets in the pm.  Dispense: 270 tablet; Refill: 1      Follow up in about 6 months (around 7/30/2025) for Diabetic Check-Up.    This note was generated with the assistance of ambient listening technology. Verbal consent was obtained by the patient and accompanying visitor(s) for the recording of patient appointment to facilitate this note. I attest to having reviewed and edited the generated note for accuracy, though some syntax or spelling errors may persist. Please contact the author of this note for any clarification.          1/30/2025 Marco Antonio Loyola PA-C

## 2025-01-30 NOTE — TELEPHONE ENCOUNTER
----- Message from Kareen sent at 1/30/2025  7:38 AM CST -----  Pt said can you call him when he needs to do labs before his next appt.  [phone]

## 2025-02-18 ENCOUNTER — OFFICE VISIT (OUTPATIENT)
Dept: OPHTHALMOLOGY | Facility: CLINIC | Age: 75
End: 2025-02-18
Payer: MEDICARE

## 2025-02-18 DIAGNOSIS — H40.1122 PRIMARY OPEN-ANGLE GLAUCOMA, LEFT EYE, MODERATE STAGE: ICD-10-CM

## 2025-02-18 DIAGNOSIS — H40.1111 PRIMARY OPEN-ANGLE GLAUCOMA, RIGHT EYE, MILD STAGE: ICD-10-CM

## 2025-02-18 DIAGNOSIS — H25.813 COMBINED FORMS OF AGE-RELATED CATARACT OF BOTH EYES: Primary | ICD-10-CM

## 2025-02-18 DIAGNOSIS — H53.001 AMBLYOPIA, RIGHT EYE: ICD-10-CM

## 2025-02-18 DIAGNOSIS — E11.9 DIABETES MELLITUS TYPE 2 WITHOUT RETINOPATHY: ICD-10-CM

## 2025-02-18 NOTE — PROGRESS NOTES
HPI     Blurred Vision     Additional comments: Ref by Dr Calix for CE augustoal. Denies eye pain today.   States his dist vision has been on decline over the past yr.  DX: Glaucoma 1-2 yrs ago. No known fam h/o Glauc. Using Latanoprost OU HS   and Timolol OU every am.  Lab Results       Component                Value               Date                       HGBA1C                   6.3 (H)             01/06/2025                 HGBA1C                   6.4 (H)             07/22/2024                 HGBA1C                   6.4 (H)             01/22/2024                Last edited by Elizabeth Claros on 2/18/2025  9:03 AM.            Assessment /Plan     For exam results, see Encounter Report.    Combined forms of age-related cataract of both eyes  -     Ambulatory referral/consult to Ophthalmology    Primary open-angle glaucoma, left eye, moderate stage    Primary open-angle glaucoma, right eye, mild stage    Diabetes mellitus type 2 without retinopathy    Amblyopia, right eye      1. Combined forms of age-related cataract of both eyes (Primary)  OS>OD  Patient with visually significant cataract impacting abiliity ADLs (reading, driving, PM driving, glare).  Discussed options, R & B, expectations, patient voices good understanding and wishes to proceed with procedure. Patient will likely benefit from sx.    Schedule CEIOL OS then OD  Monofocal dist IOL  BCVA will be limited OD due to amblyopia    RTC preop    2. Primary open-angle glaucoma, left eye, moderate stage  3. Primary open-angle glaucoma, right eye, mild stage  Reviewed imaging  IOP well controlled    Continue  Latan qhs OU  Devon qam OU    4. Diabetes mellitus type 2 without retinopathy  Diabetes without retinopathy, discussed with patient importance of glucose control and follow up.  Patient voices understanding.    5. Amblyopia, right eye  Long-standing hx

## 2025-02-21 DIAGNOSIS — M05.79 RHEUMATOID ARTHRITIS INVOLVING MULTIPLE SITES WITH POSITIVE RHEUMATOID FACTOR: ICD-10-CM

## 2025-02-21 RX ORDER — METHOTREXATE 2.5 MG/1
12.5 TABLET ORAL
Qty: 60 TABLET | Refills: 4 | Status: SHIPPED | OUTPATIENT
Start: 2025-02-21 | End: 2026-02-21

## 2025-02-24 ENCOUNTER — RESULTS FOLLOW-UP (OUTPATIENT)
Dept: FAMILY MEDICINE | Facility: CLINIC | Age: 75
End: 2025-02-24

## 2025-03-21 ENCOUNTER — OFFICE VISIT (OUTPATIENT)
Dept: OPTOMETRY | Facility: CLINIC | Age: 75
End: 2025-03-21
Payer: MEDICARE

## 2025-03-21 ENCOUNTER — CLINICAL SUPPORT (OUTPATIENT)
Dept: OPHTHALMOLOGY | Facility: CLINIC | Age: 75
End: 2025-03-21
Payer: MEDICARE

## 2025-03-21 DIAGNOSIS — H40.1122 PRIMARY OPEN ANGLE GLAUCOMA (POAG) OF LEFT EYE, MODERATE STAGE: ICD-10-CM

## 2025-03-21 DIAGNOSIS — H40.1111 PRIMARY OPEN ANGLE GLAUCOMA (POAG) OF RIGHT EYE, MILD STAGE: ICD-10-CM

## 2025-03-21 DIAGNOSIS — H25.813 COMBINED FORMS OF AGE-RELATED CATARACT OF BOTH EYES: ICD-10-CM

## 2025-03-21 DIAGNOSIS — H40.1111 PRIMARY OPEN ANGLE GLAUCOMA (POAG) OF RIGHT EYE, MILD STAGE: Primary | ICD-10-CM

## 2025-03-21 PROCEDURE — 99999 PR PBB SHADOW E&M-EST. PATIENT-LVL III: CPT | Mod: PBBFAC,,, | Performed by: OPTOMETRIST

## 2025-03-21 NOTE — PROGRESS NOTES
HPI     Glaucoma     Additional comments: DLE              Comments    Pt here today for IOP check with rev hvf for POAG - OU.    Good compliance with gtts:    Timolol OU qam  Latanoprost OU qhs    States blurred vision at distance.   Has cataract surgery scheduled with   Dr. Lockhart in May.          Last edited by Shila Arceo on 3/21/2025  9:48 AM.            Assessment /Plan     For exam results, see Encounter Report.    Primary open angle glaucoma (POAG) of right eye, mild stage    Combined forms of age-related cataract of both eyes      1. Primary open angle glaucoma (POAG) of right eye, mild stage (Primary)  TMax 21/34   / 609    Reviewed OCT / HVF today  Some progression with OCT RNFL  Overall stable defects HVF    IOP stable with drops -- target mid teens  Continue   Timolol qAM OU   Latanoprost qPM OU    Will repeat in 6 months s/p cataract extraction, consider adding meds if worsening     Visit today is associated with current or anticipated ongoing medical care related to this patients single serious condition/complex condition (primary open angle glaucoma of both eyes)     RTC in 6 months for OCT RNFL / HVF 24-2 SF     3. Combined forms of age-related cataract of both eyes  Moderate, OS > OD  Scheduled for cataract sx 05/2025

## 2025-03-25 DIAGNOSIS — H40.053 BILATERAL OCULAR HYPERTENSION: ICD-10-CM

## 2025-03-25 RX ORDER — LATANOPROST 50 UG/ML
SOLUTION/ DROPS OPHTHALMIC
Qty: 7.5 ML | Refills: 4 | Status: SHIPPED | OUTPATIENT
Start: 2025-03-25

## 2025-04-19 DIAGNOSIS — H40.1122 PRIMARY OPEN ANGLE GLAUCOMA (POAG) OF LEFT EYE, MODERATE STAGE: ICD-10-CM

## 2025-04-22 RX ORDER — TIMOLOL MALEATE 5 MG/ML
1 SOLUTION/ DROPS OPHTHALMIC DAILY
Qty: 10 ML | Refills: 1 | Status: SHIPPED | OUTPATIENT
Start: 2025-04-22 | End: 2026-04-22

## 2025-04-24 ENCOUNTER — TELEPHONE (OUTPATIENT)
Dept: FAMILY MEDICINE | Facility: CLINIC | Age: 75
End: 2025-04-24

## 2025-04-24 ENCOUNTER — OFFICE VISIT (OUTPATIENT)
Dept: FAMILY MEDICINE | Facility: CLINIC | Age: 75
End: 2025-04-24
Payer: MEDICARE

## 2025-04-24 VITALS
SYSTOLIC BLOOD PRESSURE: 112 MMHG | BODY MASS INDEX: 25.11 KG/M2 | HEIGHT: 67 IN | DIASTOLIC BLOOD PRESSURE: 70 MMHG | WEIGHT: 160 LBS

## 2025-04-24 DIAGNOSIS — S61.211D LACERATION OF LEFT INDEX FINGER WITHOUT FOREIGN BODY WITHOUT DAMAGE TO NAIL, SUBSEQUENT ENCOUNTER: Primary | ICD-10-CM

## 2025-04-24 PROCEDURE — 1159F MED LIST DOCD IN RCRD: CPT | Mod: CPTII,S$GLB,, | Performed by: PHYSICIAN ASSISTANT

## 2025-04-24 PROCEDURE — 3008F BODY MASS INDEX DOCD: CPT | Mod: CPTII,S$GLB,, | Performed by: PHYSICIAN ASSISTANT

## 2025-04-24 PROCEDURE — 3044F HG A1C LEVEL LT 7.0%: CPT | Mod: CPTII,S$GLB,, | Performed by: PHYSICIAN ASSISTANT

## 2025-04-24 PROCEDURE — 3078F DIAST BP <80 MM HG: CPT | Mod: CPTII,S$GLB,, | Performed by: PHYSICIAN ASSISTANT

## 2025-04-24 PROCEDURE — 3061F NEG MICROALBUMINURIA REV: CPT | Mod: CPTII,S$GLB,, | Performed by: PHYSICIAN ASSISTANT

## 2025-04-24 PROCEDURE — 3074F SYST BP LT 130 MM HG: CPT | Mod: CPTII,S$GLB,, | Performed by: PHYSICIAN ASSISTANT

## 2025-04-24 PROCEDURE — 99214 OFFICE O/P EST MOD 30 MIN: CPT | Mod: S$GLB,,, | Performed by: PHYSICIAN ASSISTANT

## 2025-04-24 PROCEDURE — 3066F NEPHROPATHY DOC TX: CPT | Mod: CPTII,S$GLB,, | Performed by: PHYSICIAN ASSISTANT

## 2025-04-24 RX ORDER — CEPHALEXIN 500 MG/1
500 CAPSULE ORAL 3 TIMES DAILY
COMMUNITY
Start: 2025-04-16

## 2025-04-24 RX ORDER — MUPIROCIN 20 MG/G
OINTMENT TOPICAL 2 TIMES DAILY
COMMUNITY
Start: 2025-04-16

## 2025-04-24 NOTE — TELEPHONE ENCOUNTER
----- Message from Natali sent at 4/24/2025  7:49 AM CDT -----  Regarding: PT IS HERE  PT IS HERE says he needs stitches taken out   can someone speak with him and let us know whether to put him on the schedule and where thanks mara

## 2025-04-24 NOTE — PROGRESS NOTES
SUBJECTIVE:    Patient ID: Kyrie Norton Jr. is a 75 y.o. male.    Chief Complaint: Suture / Staple Removal (Suture removal left index finger, in @ urgent care 4/16/25-updated Tdap,  records scanned in. Had to stop cephalexin, made nauseous but still using mupirocin)    History of Present Illness    CHIEF COMPLAINT:  Kyrie presents today for follow up of finger laceration    HISTORY OF PRESENT ILLNESS:  He sustained a finger laceration from a saw injury and was initially treated at an urgent care facility in [place] where three sutures were placed. He reports the area is tender and red around the wound site.    MEDICATIONS:  He discontinued Keflex due to GI upset. He is currently taking aspirin for anticoagulation.      ROS:  General: -fever, -chills, -fatigue, -weight gain, -weight loss  Eyes: -vision changes, -redness, -discharge  ENT: -ear pain, -nasal congestion, -sore throat  Cardiovascular: -chest pain, -palpitations, -lower extremity edema  Respiratory: -cough, -shortness of breath  Gastrointestinal: -abdominal pain, -nausea, -vomiting, -diarrhea, -constipation, -blood in stool, +indigestion  Genitourinary: -dysuria, -hematuria, -frequency  Musculoskeletal: -joint pain, -muscle pain  Skin: -rash, -lesion, +wound, +skin redness  Neurological: -headache, -dizziness, -numbness, -tingling  Psychiatric: -anxiety, -depression, -sleep difficulty         Office Visit on 01/30/2025   Component Date Value Ref Range Status    Cologuard Result 02/19/2025 Negative  Negative Final   Lab Visit on 01/13/2025   Component Date Value Ref Range Status    WBC 01/13/2025 4.80  3.90 - 12.70 K/uL Final    RBC 01/13/2025 4.54 (L)  4.60 - 6.20 M/uL Final    Hemoglobin 01/13/2025 13.7 (L)  14.0 - 18.0 g/dL Final    Hematocrit 01/13/2025 42.9  40.0 - 54.0 % Final    MCV 01/13/2025 95  82 - 98 fL Final    MCH 01/13/2025 30.2  27.0 - 31.0 pg Final    MCHC 01/13/2025 31.9 (L)  32.0 - 36.0 g/dL Final    RDW 01/13/2025 13.9  11.5 - 14.5  % Final    Platelets 01/13/2025 226  150 - 450 K/uL Final    MPV 01/13/2025 10.0  9.2 - 12.9 fL Final    Immature Granulocytes 01/13/2025 0.4  0.0 - 0.5 % Final    Gran # (ANC) 01/13/2025 2.3  1.8 - 7.7 K/uL Final    Immature Grans (Abs) 01/13/2025 0.02  0.00 - 0.04 K/uL Final    Lymph # 01/13/2025 1.6  1.0 - 4.8 K/uL Final    Mono # 01/13/2025 0.5  0.3 - 1.0 K/uL Final    Eos # 01/13/2025 0.4  0.0 - 0.5 K/uL Final    Baso # 01/13/2025 0.02  0.00 - 0.20 K/uL Final    nRBC 01/13/2025 0  0 /100 WBC Final    Gran % 01/13/2025 47.1  38.0 - 73.0 % Final    Lymph % 01/13/2025 34.0  18.0 - 48.0 % Final    Mono % 01/13/2025 10.2  4.0 - 15.0 % Final    Eosinophil % 01/13/2025 7.9  0.0 - 8.0 % Final    Basophil % 01/13/2025 0.4  0.0 - 1.9 % Final    Differential Method 01/13/2025 Automated   Final    Sodium 01/13/2025 139  136 - 145 mmol/L Final    Potassium 01/13/2025 4.1  3.5 - 5.1 mmol/L Final    Chloride 01/13/2025 104  95 - 110 mmol/L Final    CO2 01/13/2025 27  23 - 29 mmol/L Final    Glucose 01/13/2025 120 (H)  70 - 110 mg/dL Final    BUN 01/13/2025 12  8 - 23 mg/dL Final    Creatinine 01/13/2025 0.8  0.5 - 1.4 mg/dL Final    Calcium 01/13/2025 8.8  8.7 - 10.5 mg/dL Final    Total Protein 01/13/2025 6.8  6.0 - 8.4 g/dL Final    Albumin 01/13/2025 3.7  3.5 - 5.2 g/dL Final    Total Bilirubin 01/13/2025 0.7  0.1 - 1.0 mg/dL Final    Alkaline Phosphatase 01/13/2025 53  40 - 150 U/L Final    AST 01/13/2025 14  10 - 40 U/L Final    ALT 01/13/2025 18  10 - 44 U/L Final    eGFR 01/13/2025 >60.0  >60 mL/min/1.73 m^2 Final    Anion Gap 01/13/2025 8  8 - 16 mmol/L Final    Sed Rate 01/13/2025 0  0 - 10 mm/Hr Final    CRP 01/13/2025 <0.3  0.0 - 8.2 mg/L Final   Telephone on 01/06/2025   Component Date Value Ref Range Status    Cholesterol 01/06/2025 151  <200 mg/dL Final    HDL 01/06/2025 48  > OR = 40 mg/dL Final    Triglycerides 01/06/2025 144  <150 mg/dL Final    LDL Cholesterol 01/06/2025 79  mg/dL (calc) Final     HDL/Cholesterol Ratio 01/06/2025 3.1  <5.0 (calc) Final    Non HDL Chol. (LDL+VLDL) 01/06/2025 103  <130 mg/dL (calc) Final    TSH w/reflex to FT4 01/06/2025 2.84  0.40 - 4.50 mIU/L Final    Color, UA 01/06/2025 YELLOW  YELLOW Final    Appearance, UA 01/06/2025 CLEAR  CLEAR Final    Specific Gravity, UA 01/06/2025 1.016  1.001 - 1.035 Final    pH, UA 01/06/2025 6.0  5.0 - 8.0 Final    Glucose, UA 01/06/2025 NEGATIVE  NEGATIVE Final    Bilirubin, UA 01/06/2025 NEGATIVE  NEGATIVE Final    Ketones, UA 01/06/2025 NEGATIVE  NEGATIVE Final    Occult Blood UA 01/06/2025 NEGATIVE  NEGATIVE Final    Protein, UA 01/06/2025 NEGATIVE  NEGATIVE Final    Nitrite, UA 01/06/2025 NEGATIVE  NEGATIVE Final    Leukocytes, UA 01/06/2025 NEGATIVE  NEGATIVE Final    WBC Casts, UA 01/06/2025 NONE SEEN  < OR = 5 /HPF Final    RBC Casts, UA 01/06/2025 NONE SEEN  < OR = 2 /HPF Final    Squam Epithel, UA 01/06/2025 NONE SEEN  < OR = 5 /HPF Final    Bacteria, UA 01/06/2025 NONE SEEN  NONE SEEN /HPF Final    Hyaline Casts, UA 01/06/2025 NONE SEEN  NONE SEEN /LPF Final    Service Cmt: 01/06/2025 SEE COMMENT   Final    Reflexive Urine Culture 01/06/2025 SEE COMMENT   Final    Creatinine, Urine 01/06/2025 80  20 - 320 mg/dL Final    Microalb, Ur 01/06/2025 0.6  See Note: mg/dL Final    Microalb/Creat Ratio 01/06/2025 8  <30 mg/g creat Final    Hemoglobin A1C 01/06/2025 6.3 (H)  <5.7 % of total Hgb Final    PROSTATE SPECIFIC ANTIGEN, SCR - Q* 01/06/2025 1.96  < OR = 4.00 ng/mL Final   Patient Outreach on 11/12/2024   Component Date Value Ref Range Status    Left Eye DM Retinopathy 11/06/2024 Negative   Final    Right Eye DM Retinopathy 11/06/2024 Negative   Final       Past Medical History:   Diagnosis Date    Diabetes mellitus type II     Hyperlipidemia     Hypertension      No past surgical history on file.  Family History   Problem Relation Name Age of Onset    Blindness Neg Hx      Glaucoma Neg Hx      Macular degeneration Neg Hx         Marital  "Status:   Alcohol History:  reports current alcohol use.  Tobacco History:  reports that he has never smoked. He has never used smokeless tobacco.  Drug History:  reports no history of drug use.    Review of patient's allergies indicates:   Allergen Reactions    Parafon forte Nausea Only     Current Medications[1]    Objective:      Vitals:    04/24/25 1307   BP: 112/70   Weight: 72.6 kg (160 lb)   Height: 5' 7" (1.702 m)     Physical Exam    General: No acute distress. Well-developed. Well-nourished.  Eyes: EOMI. Sclerae anicteric.  HENT: Normocephalic. Atraumatic. Nares patent. Moist oral mucosa.  Ears: Bilateral TMs clear. Bilateral EACs clear.  Cardiovascular: Regular rate. Regular rhythm. No murmurs. No rubs. No gallops. Normal S1, S2.  Respiratory: Normal respiratory effort. Clear to auscultation bilaterally. No rales. No rhonchi. No wheezing.  Abdomen: Soft. Non-tender. Non-distended. Normoactive bowel sounds.  Musculoskeletal: No  obvious deformity. Full range of motion.  Extremities: No lower extremity edema.  Neurological: Alert & oriented x3. No slurred speech. Normal gait. Sensation intact. Neurovascularly intact.  Psychiatric: Normal mood. Normal affect. Good insight. Good judgment.  Skin: Warm. Dry. No rash. Laceration on finger with mild erythema.         Assessment:       Assessment & Plan    - Assessed laceration on finger, likely from a saw injury with distal digital nerve likely partially severed during injury, causing sensitivity.  - Evaluated wound for signs of infection and neurovascular integrity.  - Removed 3 stitches from finger laceration.  - Considered continuing oral antibiotic therapy, but opted to monitor redness and potentially switch to Bactrim or doxycycline if condition worsens.    LACERATION OF RIGHT INDEX FINGER:  - Examined the laceration on the patient's finger from a saw accident, noting 3 stitches and some redness around the wound.  - Assessed full range of motion and " intact neurovascular status.  - Acknowledged sensitivity at the end of the finger, likely due to partial severing of the distal digital nerve.  - Removed the stitches.  - Instructed the patient to wash the wound with soap and water, then dry it.  - Advised to apply hydrogen peroxide to the wound and dry it again after application.  - Recommend continuing mupirocin (Neosporin) ointment application to the wound.  - Recommend water rinse and no precautions as the wound is closed.  - Noted that the patient cut their finger with a saw.    CONTACT WITH [employer] TOOL:  - Noted that the patient cut their finger with a saw.    TOXIC ERYTHEMA / REDNESS:  - Observed redness around the wound during exam.  - Instructed the patient to monitor the redness around the wound, ensuring it is not spreading.  - Advised to contact the office if the redness around the wound worsens.  - Recommend continuing with ointment application.  - May prescribe oral antibiotics if redness worsens.    ADVERSE EFFECT OF ANTIBIOTICS:  - Noted that the patient stopped taking Keflex due to adverse effects.  - Will consider alternative antibiotics if needed.    LONG TERM USE OF ASPIRIN:  - Confirmed the patient's current use of aspirin as a blood thinner.    FOLLOW-UP:  - Follow up on July 31st as scheduled.       Plan:       Laceration of left index finger without foreign body without damage to nail, subsequent encounter      No follow-ups on file.    This note was generated with the assistance of ambient listening technology. Verbal consent was obtained by the patient and accompanying visitor(s) for the recording of patient appointment to facilitate this note. I attest to having reviewed and edited the generated note for accuracy, though some syntax or spelling errors may persist. Please contact the author of this note for any clarification.          4/24/2025 Marco Antonio Loyola PA-C           [1]   Current Outpatient Medications:     adalimumab-adaz  (HYRIMOZ,CF, PEN) 40 mg/0.4 mL PnIj, Inject 0.4 mLs (40 mg total) into the skin every 7 days., Disp: 12 pen , Rfl: 3    aspirin (ECOTRIN) 81 MG EC tablet, Take 81 mg by mouth once daily., Disp: , Rfl:     atorvastatin (LIPITOR) 40 MG tablet, Take 1 tablet (40 mg total) by mouth once daily., Disp: 90 tablet, Rfl: 3    blood sugar diagnostic (ACCU-CHEK CJ PLUS TEST STRP) Strp, 1 strip by Subdermal route Daily., Disp: 100 strip, Rfl: 4    coenzyme Q10 10 mg capsule, Take 10 mg by mouth once daily., Disp: , Rfl:     fish oil-omega-3 fatty acids 300-1,000 mg capsule, Take 2 g by mouth once daily., Disp: , Rfl:     folic acid (FOLVITE) 1 MG tablet, Take 1 tablet (1,000 mcg total) by mouth once daily., Disp: 90 tablet, Rfl: 3    glipizide-metformin (METAGLIP) 5-500 mg per tablet, 1 tablet in the am, 2 tablets in the pm., Disp: 270 tablet, Rfl: 1    lancets (ACCU-CHEK FASTCLIX) Misc, 1 Device by Misc.(Non-Drug; Combo Route) route Daily., Disp: 100 each, Rfl: 4    latanoprost 0.005 % ophthalmic solution, INSTILL 1 DROP INTO BOTH EYES IN THE EVENING, Disp: 7.5 mL, Rfl: 4    methocarbamoL (ROBAXIN) 750 MG Tab, Take 1 tablet (750 mg total) by mouth 3 (three) times daily as needed (Muscle cramps)., Disp: 90 tablet, Rfl: 1    methotrexate 2.5 MG Tab, TAKE 5 TABLETS (12.5 MG TOTAL) BY MOUTH EVERY 7 DAYS., Disp: 60 tablet, Rfl: 4    MILK THISTLE ORAL, Take 250 mg by mouth once daily. , Disp: , Rfl:     mupirocin (BACTROBAN) 2 % ointment, Apply topically 2 (two) times daily., Disp: , Rfl:     timolol maleate 0.5% (TIMOPTIC) 0.5 % Drop, PLACE 1 DROP INTO BOTH EYES ONCE DAILY, Disp: 10 mL, Rfl: 1    cephALEXin (KEFLEX) 500 MG capsule, Take 500 mg by mouth 3 (three) times daily. (Patient not taking: Reported on 4/24/2025), Disp: , Rfl:     fluocinonide-emollient (FLUOCINONIDE-E) 0.05 % Crea, Apply topically 2 (two) times daily. for 7 days, Disp: 60 g, Rfl: 0

## 2025-04-24 NOTE — TELEPHONE ENCOUNTER
Booked to come back @ 1:00 for suture removal and scanned records to chart, updated Tdap vaccine record

## 2025-05-08 ENCOUNTER — OFFICE VISIT (OUTPATIENT)
Dept: OPHTHALMOLOGY | Facility: CLINIC | Age: 75
End: 2025-05-08
Payer: MEDICARE

## 2025-05-08 DIAGNOSIS — H25.813 COMBINED FORMS OF AGE-RELATED CATARACT OF BOTH EYES: Primary | ICD-10-CM

## 2025-05-08 PROCEDURE — 3044F HG A1C LEVEL LT 7.0%: CPT | Mod: CPTII,S$GLB,, | Performed by: OPHTHALMOLOGY

## 2025-05-08 PROCEDURE — 1159F MED LIST DOCD IN RCRD: CPT | Mod: CPTII,S$GLB,, | Performed by: OPHTHALMOLOGY

## 2025-05-08 PROCEDURE — 99999 PR PBB SHADOW E&M-EST. PATIENT-LVL III: CPT | Mod: PBBFAC,,, | Performed by: OPHTHALMOLOGY

## 2025-05-08 PROCEDURE — 3288F FALL RISK ASSESSMENT DOCD: CPT | Mod: CPTII,S$GLB,, | Performed by: OPHTHALMOLOGY

## 2025-05-08 PROCEDURE — 3066F NEPHROPATHY DOC TX: CPT | Mod: CPTII,S$GLB,, | Performed by: OPHTHALMOLOGY

## 2025-05-08 PROCEDURE — 1126F AMNT PAIN NOTED NONE PRSNT: CPT | Mod: CPTII,S$GLB,, | Performed by: OPHTHALMOLOGY

## 2025-05-08 PROCEDURE — 99214 OFFICE O/P EST MOD 30 MIN: CPT | Mod: S$GLB,,, | Performed by: OPHTHALMOLOGY

## 2025-05-08 PROCEDURE — 3061F NEG MICROALBUMINURIA REV: CPT | Mod: CPTII,S$GLB,, | Performed by: OPHTHALMOLOGY

## 2025-05-08 PROCEDURE — 1101F PT FALLS ASSESS-DOCD LE1/YR: CPT | Mod: CPTII,S$GLB,, | Performed by: OPHTHALMOLOGY

## 2025-05-08 PROCEDURE — 92136 OPHTHALMIC BIOMETRY: CPT | Mod: LT,S$GLB,, | Performed by: OPHTHALMOLOGY

## 2025-05-08 RX ORDER — PREDNISOLONE ACETATE 10 MG/ML
1 SUSPENSION/ DROPS OPHTHALMIC 4 TIMES DAILY
Qty: 5 ML | Refills: 2 | Status: SHIPPED | OUTPATIENT
Start: 2025-05-08

## 2025-05-08 RX ORDER — KETOROLAC TROMETHAMINE 5 MG/ML
1 SOLUTION OPHTHALMIC 4 TIMES DAILY
Qty: 5 ML | Refills: 2 | Status: SHIPPED | OUTPATIENT
Start: 2025-05-08

## 2025-05-08 RX ORDER — CYCLOP/TROP/PROPA/PHEN/KET/WAT 1-1-0.1%
1 DROPS (EA) OPHTHALMIC (EYE)
OUTPATIENT
Start: 2025-05-08 | End: 2025-05-08

## 2025-05-08 RX ORDER — MOXIFLOXACIN 5 MG/ML
1 SOLUTION/ DROPS OPHTHALMIC 4 TIMES DAILY
Qty: 3 ML | Refills: 1 | Status: SHIPPED | OUTPATIENT
Start: 2025-05-08

## 2025-05-08 RX ORDER — SODIUM CHLORIDE 9 MG/ML
INJECTION, SOLUTION INTRAVENOUS CONTINUOUS
OUTPATIENT
Start: 2025-05-08

## 2025-05-08 NOTE — PROGRESS NOTES
HPI    Pre op cat sx OS scheduled for 5/14     Pt c/o blurry vision and glare.     Denies pain/ FOL/ floaters.     Last edited by Rosetta Nelson on 5/8/2025 11:25 AM.            Assessment /Plan     For exam results, see Encounter Report.    Combined forms of age-related cataract of both eyes      Patient with visually significant cataract impacting abiliity ADLs (reading, driving, PM driving, glare).  Discussed options, R & B, expectations, patient voices good understanding and wishes to proceed with procedure. Patient will likely benefit from sx and signed consent.    Proceed with CEIOL OS  Monofocal dist IOL  Pt declines toric

## 2025-05-13 ENCOUNTER — ANESTHESIA EVENT (OUTPATIENT)
Dept: SURGERY | Facility: HOSPITAL | Age: 75
End: 2025-05-13
Payer: MEDICARE

## 2025-05-13 ENCOUNTER — TELEPHONE (OUTPATIENT)
Dept: OPHTHALMOLOGY | Facility: CLINIC | Age: 75
End: 2025-05-13
Payer: MEDICARE

## 2025-05-14 ENCOUNTER — ANESTHESIA (OUTPATIENT)
Dept: SURGERY | Facility: HOSPITAL | Age: 75
End: 2025-05-14
Payer: MEDICARE

## 2025-05-14 ENCOUNTER — HOSPITAL ENCOUNTER (OUTPATIENT)
Facility: HOSPITAL | Age: 75
Discharge: HOME | End: 2025-05-14
Attending: OPHTHALMOLOGY | Admitting: OPHTHALMOLOGY
Payer: MEDICARE

## 2025-05-14 VITALS
WEIGHT: 160.06 LBS | OXYGEN SATURATION: 97 % | RESPIRATION RATE: 17 BRPM | SYSTOLIC BLOOD PRESSURE: 144 MMHG | BODY MASS INDEX: 25.07 KG/M2 | DIASTOLIC BLOOD PRESSURE: 69 MMHG | TEMPERATURE: 98 F | HEART RATE: 64 BPM

## 2025-05-14 DIAGNOSIS — H25.813 COMBINED FORMS OF AGE-RELATED CATARACT OF BOTH EYES: ICD-10-CM

## 2025-05-14 LAB — POCT GLUCOSE: 113 MG/DL (ref 70–110)

## 2025-05-14 PROCEDURE — 25000003 PHARM REV CODE 250: Performed by: OPHTHALMOLOGY

## 2025-05-14 PROCEDURE — 37000008 HC ANESTHESIA 1ST 15 MINUTES: Performed by: OPHTHALMOLOGY

## 2025-05-14 PROCEDURE — 66984 XCAPSL CTRC RMVL W/O ECP: CPT | Mod: LT,,, | Performed by: OPHTHALMOLOGY

## 2025-05-14 PROCEDURE — 63600175 PHARM REV CODE 636 W HCPCS: Performed by: OPHTHALMOLOGY

## 2025-05-14 PROCEDURE — 37000009 HC ANESTHESIA EA ADD 15 MINS: Performed by: OPHTHALMOLOGY

## 2025-05-14 PROCEDURE — 36000706: Performed by: OPHTHALMOLOGY

## 2025-05-14 PROCEDURE — 63600175 PHARM REV CODE 636 W HCPCS: Performed by: NURSE ANESTHETIST, CERTIFIED REGISTERED

## 2025-05-14 PROCEDURE — 36000707: Performed by: OPHTHALMOLOGY

## 2025-05-14 PROCEDURE — 66999 UNLISTED PX ANT SEGMENT EYE: CPT | Mod: CSM,,, | Performed by: OPHTHALMOLOGY

## 2025-05-14 PROCEDURE — V2787 ASTIGMATISM-CORRECT FUNCTION: HCPCS | Performed by: OPHTHALMOLOGY

## 2025-05-14 PROCEDURE — 71000033 HC RECOVERY, INTIAL HOUR: Performed by: OPHTHALMOLOGY

## 2025-05-14 PROCEDURE — 63600175 PHARM REV CODE 636 W HCPCS: Performed by: ANESTHESIOLOGY

## 2025-05-14 DEVICE — CLAREON TORIC UVA
Type: IMPLANTABLE DEVICE | Site: EYE | Status: FUNCTIONAL
Brand: CLAREON

## 2025-05-14 RX ORDER — ONDANSETRON HYDROCHLORIDE 2 MG/ML
INJECTION, SOLUTION INTRAVENOUS
Status: DISCONTINUED | OUTPATIENT
Start: 2025-05-14 | End: 2025-05-14

## 2025-05-14 RX ORDER — MOXIFLOXACIN 5 MG/ML
SOLUTION/ DROPS OPHTHALMIC
Status: DISCONTINUED | OUTPATIENT
Start: 2025-05-14 | End: 2025-05-14 | Stop reason: HOSPADM

## 2025-05-14 RX ORDER — SODIUM CHLORIDE 9 MG/ML
INJECTION, SOLUTION INTRAVENOUS CONTINUOUS
Status: DISCONTINUED | OUTPATIENT
Start: 2025-05-14 | End: 2025-05-14

## 2025-05-14 RX ORDER — LIDOCAINE HYDROCHLORIDE 40 MG/ML
INJECTION, SOLUTION RETROBULBAR
Status: DISCONTINUED | OUTPATIENT
Start: 2025-05-14 | End: 2025-05-14 | Stop reason: HOSPADM

## 2025-05-14 RX ORDER — CYCLOP/TROP/PROPA/PHEN/KET/WAT 1-1-0.1%
1 DROPS (EA) OPHTHALMIC (EYE)
Status: ACTIVE | OUTPATIENT
Start: 2025-05-14 | End: 2025-05-14

## 2025-05-14 RX ORDER — EPINEPHRINE 1 MG/ML
INJECTION, SOLUTION, CONCENTRATE INTRAVENOUS
Status: DISCONTINUED | OUTPATIENT
Start: 2025-05-14 | End: 2025-05-14 | Stop reason: HOSPADM

## 2025-05-14 RX ORDER — TETRACAINE HYDROCHLORIDE 5 MG/ML
SOLUTION OPHTHALMIC
Status: DISCONTINUED | OUTPATIENT
Start: 2025-05-14 | End: 2025-05-14 | Stop reason: HOSPADM

## 2025-05-14 RX ORDER — MIDAZOLAM HYDROCHLORIDE 1 MG/ML
INJECTION INTRAMUSCULAR; INTRAVENOUS
Status: DISCONTINUED | OUTPATIENT
Start: 2025-05-14 | End: 2025-05-14

## 2025-05-14 RX ORDER — LIDOCAINE HYDROCHLORIDE 10 MG/ML
1 INJECTION, SOLUTION EPIDURAL; INFILTRATION; INTRACAUDAL; PERINEURAL ONCE
Status: COMPLETED | OUTPATIENT
Start: 2025-05-14 | End: 2025-05-14

## 2025-05-14 RX ADMIN — MIDAZOLAM HYDROCHLORIDE 1 MG: 1 INJECTION, SOLUTION INTRAMUSCULAR; INTRAVENOUS at 10:05

## 2025-05-14 RX ADMIN — ONDANSETRON 4 MG: 2 INJECTION, SOLUTION INTRAMUSCULAR; INTRAVENOUS at 10:05

## 2025-05-14 RX ADMIN — Medication 1 DROP: at 09:05

## 2025-05-14 RX ADMIN — LIDOCAINE HYDROCHLORIDE 10 MG: 10 INJECTION, SOLUTION EPIDURAL; INFILTRATION; INTRACAUDAL at 09:05

## 2025-05-14 RX ADMIN — MIDAZOLAM HYDROCHLORIDE 1 MG: 1 INJECTION, SOLUTION INTRAMUSCULAR; INTRAVENOUS at 09:05

## 2025-05-14 NOTE — DISCHARGE SUMMARY
[facility] - Periop Services  Discharge Note  Short Stay    Procedure(s) (LRB):  CEIOL OS  Toric (Left)      OUTCOME: Patient tolerated treatment/procedure well without complication and is now ready for discharge.    DISPOSITION: Home or Self Care    FINAL DIAGNOSIS:  cataract    FOLLOWUP: In clinic    DISCHARGE INSTRUCTIONS:  No discharge procedures on file.     TIME SPENT ON DISCHARGE: 5 minutes

## 2025-05-14 NOTE — TRANSFER OF CARE
Anesthesia Transfer of Care Note    Patient: Kyrie Norton Jr.    Procedure(s) Performed: Procedure(s) (LRB):  CEIOL OS  Toric (Left)    Patient location: PACU    Anesthesia Type: MAC    Transport from OR: Transported from OR on room air with adequate spontaneous ventilation    Post pain: adequate analgesia    Post assessment: no apparent anesthetic complications    Post vital signs: stable    Level of consciousness: awake    Nausea/Vomiting: no nausea/vomiting    Complications: none    Transfer of care protocol was followed      Last vitals: Visit Vitals  BP (!) 173/79 (BP Location: Left arm, Patient Position: Lying)   Pulse 61   Temp 36.6 °C (97.9 °F) (Skin)   Resp 16   Wt 72.6 kg (160 lb 0.9 oz)   SpO2 99%   BMI 25.07 kg/m²

## 2025-05-14 NOTE — PLAN OF CARE
Discharge instructions given to pt/wife, verbalized understanding.  Tolerating PO fluids.  IV removed,.  Denies pain.  Sunglasses on.  Eye drops in pt's possession.  Ambulating out to wife  per RN in no distress.

## 2025-05-14 NOTE — ANESTHESIA PREPROCEDURE EVALUATION
05/14/2025  Kyrie Norton Jr. is a 75 y.o., male.      Pre-op Assessment    I have reviewed the Patient Summary Reports.    I have reviewed the NPO Status.   I have reviewed the Medications.     Review of Systems  Cardiovascular:     Hypertension           hyperlipidemia                         Hypertension         Pulmonary:  Pulmonary Normal                       Musculoskeletal:  Arthritis        Arthritis          Neurological:  Neurology Normal              Arthritis                           Endocrine:  Diabetes, type 2    Diabetes                          Physical Exam  General: Well nourished    Airway:  Mallampati: II   Mouth Opening: Normal  TM Distance: Normal  Neck ROM: Normal ROM        Anesthesia Plan  Type of Anesthesia, risks & benefits discussed:    Anesthesia Type: MAC  Intra-op Monitoring Plan: Standard ASA Monitors  Induction:  IV  Informed Consent: Informed consent signed with the Patient and all parties understand the risks and agree with anesthesia plan.  All questions answered.   ASA Score: 2    Ready For Surgery From Anesthesia Perspective.     .

## 2025-05-14 NOTE — H&P
History    Chief complaint:  Painless progressive vision loss left Eye    Present Ilness/Diagnosis: Visually significant cataract, left Eye    ROS: +Eyes, otherwise no significant changes    Past Medical History: refer to chart    Family History/Social History: refer to chart    Allergies:   Review of patient's allergies indicates:   Allergen Reactions    Parafon forte Nausea Only       Current Medications: see medcard      Physical Exam    BP: Vital signs stable  General: No apparent distress  HEENT: cataract  Lungs: adequate respirations  Heart: + pulses  Abdomen: soft  Rectal/pelvic: deferred    Labs: Labs Reviewed    Lab Results   Component Value Date    WBC 4.80 01/13/2025    HGB 13.7 (L) 01/13/2025    HCT 42.9 01/13/2025    MCV 95 01/13/2025     01/13/2025           CMP  Sodium   Date Value Ref Range Status   01/13/2025 139 136 - 145 mmol/L Final   04/24/2019 139 134 - 144 mmol/L      Potassium   Date Value Ref Range Status   01/13/2025 4.1 3.5 - 5.1 mmol/L Final     Chloride   Date Value Ref Range Status   01/13/2025 104 95 - 110 mmol/L Final   04/24/2019 102 98 - 110 mmol/L      CO2   Date Value Ref Range Status   01/13/2025 27 23 - 29 mmol/L Final     Glucose   Date Value Ref Range Status   01/13/2025 120 (H) 70 - 110 mg/dL Final   04/24/2019 85 70 - 99 mg/dL      BUN   Date Value Ref Range Status   01/13/2025 12 8 - 23 mg/dL Final     Creatinine   Date Value Ref Range Status   01/13/2025 0.8 0.5 - 1.4 mg/dL Final   04/24/2019 0.82 0.60 - 1.40 mg/dL      Calcium   Date Value Ref Range Status   01/13/2025 8.8 8.7 - 10.5 mg/dL Final     Total Protein   Date Value Ref Range Status   01/13/2025 6.8 6.0 - 8.4 g/dL Final     Albumin   Date Value Ref Range Status   01/13/2025 3.7 3.5 - 5.2 g/dL Final   04/24/2019 4.1 3.1 - 4.7 g/dL      Total Bilirubin   Date Value Ref Range Status   01/13/2025 0.7 0.1 - 1.0 mg/dL Final     Comment:     For infants and newborns, interpretation of results should be based  on  gestational age, weight and in agreement with clinical  observations.    Premature Infant recommended reference ranges:  Up to 24 hours.............<8.0 mg/dL  Up to 48 hours............<12.0 mg/dL  3-5 days..................<15.0 mg/dL  6-29 days.................<15.0 mg/dL       Alkaline Phosphatase   Date Value Ref Range Status   01/13/2025 53 40 - 150 U/L Final     AST   Date Value Ref Range Status   01/13/2025 14 10 - 40 U/L Final     ALT   Date Value Ref Range Status   01/13/2025 18 10 - 44 U/L Final     Anion Gap   Date Value Ref Range Status   01/13/2025 8 8 - 16 mmol/L Final     eGFR   Date Value Ref Range Status   01/13/2025 >60.0 >60 mL/min/1.73 m^2 Final   01/22/2024 93 > OR = 60 mL/min/1.73m2 Final       The patient has been cleared for surgery in an ambulatory surgery facility.     Impression: Visually significant Cataract left Eye    Plan: Phacoemulsification with implantation of Intraocular lens left Eye

## 2025-05-14 NOTE — OP NOTE
Operative Date:  05/14/2025    Discharge Date:  05/14/2025    Report Title: Operative Note    SURGEON: Allan Lockhart MD    ASSISTANT: None    PREOPERATIVE DIAGNOSIS: Age-related nuclear cataract, Left Eye    POSTOPERATIVE DIAGNOSIS: same    PROCEDURE PERFORMED: Phacoemulsification of the cataract with toric posterior chamber intraocular lens Left Eye    IMPLANTS: [id number] 22.5 @ 170 degrees    ANESTHESIA:  Topical with MAC    COMPLICATIONS: None    ESTIMATED BLOOD LOSS: Minimal    PROCEDURE: In the preoperative area, the eye was marked at 0 and 180 degrees while the patient was sitting upright. The patient was brought to the operating room, time out was performed and implant checked.  The patient was given light sedation, and topical anesthesia was instilled in the left eye.  The left eye was prepped and draped in the usual fashion for eye surgery and lid speculum used to retract the eyelid. The eyelashes were secluded within the drape.  The correct axis of the toric lens was marked on the cornea using atkinson ring and toric marker. A paracentesis was made inferiorly with a sideport blade. Epishugarcaine was injected in the anterior chamber and dispersive viscoelastic was injected into the anterior chamber. A temporal corneal incision was made with a steel keratome. A cystitome was used to initiate a continuous curvilinear capsulorrhexis and completed using the capsulorrhexis forceps. Hydrodissection of the lens nucleus was performed using balanced salt solution (BSS) on hydrodissection cannula. The lens nucleus was removed using phacoemulsification in the modified stop and chop technique. The lens cortex was removed using the irrigation/aspiration handpiece. The capsular bag was filled with viscoelastic, and the intraocular lens was injected into the capsular bag under direct visualization.  The lens was rotated into the correct position as noted above.  Viscoelastic was removed using the irrigation/aspiration  handpiece. The wounds were hydrated until watertight.  Lens position was again verified.       The wounds were rechecked and no leakage was noted.  The speculum was removed. Topical antibiotic was applied to the eye and shield was placed over the eye. The patient tolerated the procedure well and left the operating room in good condition.

## 2025-05-15 ENCOUNTER — OFFICE VISIT (OUTPATIENT)
Dept: OPHTHALMOLOGY | Facility: CLINIC | Age: 75
End: 2025-05-15
Payer: MEDICARE

## 2025-05-15 DIAGNOSIS — Z98.42 STATUS POST CATARACT SURGERY, LEFT: Primary | ICD-10-CM

## 2025-05-15 PROBLEM — H25.813 COMBINED FORMS OF AGE-RELATED CATARACT OF BOTH EYES: Status: RESOLVED | Noted: 2025-05-14 | Resolved: 2025-05-15

## 2025-05-15 PROCEDURE — 3288F FALL RISK ASSESSMENT DOCD: CPT | Mod: CPTII,S$GLB,, | Performed by: OPHTHALMOLOGY

## 2025-05-15 PROCEDURE — 1159F MED LIST DOCD IN RCRD: CPT | Mod: CPTII,S$GLB,, | Performed by: OPHTHALMOLOGY

## 2025-05-15 PROCEDURE — 99024 POSTOP FOLLOW-UP VISIT: CPT | Mod: S$GLB,,, | Performed by: OPHTHALMOLOGY

## 2025-05-15 PROCEDURE — 3061F NEG MICROALBUMINURIA REV: CPT | Mod: CPTII,S$GLB,, | Performed by: OPHTHALMOLOGY

## 2025-05-15 PROCEDURE — 1101F PT FALLS ASSESS-DOCD LE1/YR: CPT | Mod: CPTII,S$GLB,, | Performed by: OPHTHALMOLOGY

## 2025-05-15 PROCEDURE — 99999 PR PBB SHADOW E&M-EST. PATIENT-LVL III: CPT | Mod: PBBFAC,,, | Performed by: OPHTHALMOLOGY

## 2025-05-15 PROCEDURE — 1160F RVW MEDS BY RX/DR IN RCRD: CPT | Mod: CPTII,S$GLB,, | Performed by: OPHTHALMOLOGY

## 2025-05-15 PROCEDURE — 1126F AMNT PAIN NOTED NONE PRSNT: CPT | Mod: CPTII,S$GLB,, | Performed by: OPHTHALMOLOGY

## 2025-05-15 PROCEDURE — 3044F HG A1C LEVEL LT 7.0%: CPT | Mod: CPTII,S$GLB,, | Performed by: OPHTHALMOLOGY

## 2025-05-15 PROCEDURE — 3066F NEPHROPATHY DOC TX: CPT | Mod: CPTII,S$GLB,, | Performed by: OPHTHALMOLOGY

## 2025-05-15 NOTE — ANESTHESIA POSTPROCEDURE EVALUATION
Anesthesia Post Evaluation    Patient: Kyrie Norton Jr.    Procedure(s) Performed: Procedure(s) (LRB):  CEIOL OS  Toric (Left)    Final Anesthesia Type: MAC      Patient location during evaluation: PACU  Patient participation: Yes- Able to Participate  Level of consciousness: awake and alert  Post-procedure vital signs: reviewed and stable  Pain management: adequate  Airway patency: patent    PONV status at discharge: No PONV  Anesthetic complications: no      Cardiovascular status: blood pressure returned to baseline  Respiratory status: unassisted  Hydration status: euvolemic  Follow-up not needed.              Vitals Value Taken Time   /69 05/14/25 11:10   Temp  05/15/25 09:56   Pulse 64 05/14/25 11:10   Resp 17 05/14/25 11:10   SpO2 97 % 05/14/25 11:10         Event Time   Out of Recovery 11:12:00         Pain/Marnia Score: Marina Score: 10 (5/14/2025 11:05 AM)

## 2025-05-15 NOTE — PROGRESS NOTES
HPI    1 day s/p phaco IOL OS done on 5/14/2025    Pt states OS feels well. Denies pain/ FOL/ floaters.     Compliant with post op gtts. Using pred, moxi, and keto as directed.     Last edited by Rosetta Nelson on 5/15/2025  8:11 AM.            Assessment /Plan     For exam results, see Encounter Report.    Status post cataract surgery, left      POD1 CEIOL OS toric  Doing well  Post op precautions and instructions reviewed, sheet given  moxi QID  PF QID  Keto qdaily    Continue glaucoma drops unchanged    F/u 1 week, brief refract OS, consider schedule OD

## 2025-05-19 ENCOUNTER — RESULTS FOLLOW-UP (OUTPATIENT)
Dept: RHEUMATOLOGY | Facility: CLINIC | Age: 75
End: 2025-05-19

## 2025-05-19 ENCOUNTER — HOSPITAL ENCOUNTER (OUTPATIENT)
Dept: RADIOLOGY | Facility: CLINIC | Age: 75
Discharge: HOME | End: 2025-05-19
Attending: STUDENT IN AN ORGANIZED HEALTH CARE EDUCATION/TRAINING PROGRAM
Payer: MEDICARE

## 2025-05-19 DIAGNOSIS — M15.0 PRIMARY OSTEOARTHRITIS INVOLVING MULTIPLE JOINTS: ICD-10-CM

## 2025-05-19 DIAGNOSIS — M85.89 OTHER SPECIFIED DISORDERS OF BONE DENSITY AND STRUCTURE, MULTIPLE SITES: ICD-10-CM

## 2025-05-19 DIAGNOSIS — M05.79 RHEUMATOID ARTHRITIS INVOLVING MULTIPLE SITES WITH POSITIVE RHEUMATOID FACTOR: ICD-10-CM

## 2025-05-19 PROCEDURE — 82565 ASSAY OF CREATININE: CPT | Performed by: STUDENT IN AN ORGANIZED HEALTH CARE EDUCATION/TRAINING PROGRAM

## 2025-05-19 PROCEDURE — 84460 ALANINE AMINO (ALT) (SGPT): CPT | Performed by: STUDENT IN AN ORGANIZED HEALTH CARE EDUCATION/TRAINING PROGRAM

## 2025-05-19 PROCEDURE — 77080 DXA BONE DENSITY AXIAL: CPT | Mod: TC,PO

## 2025-05-19 PROCEDURE — 86140 C-REACTIVE PROTEIN: CPT | Performed by: STUDENT IN AN ORGANIZED HEALTH CARE EDUCATION/TRAINING PROGRAM

## 2025-05-19 PROCEDURE — 84450 TRANSFERASE (AST) (SGOT): CPT | Performed by: STUDENT IN AN ORGANIZED HEALTH CARE EDUCATION/TRAINING PROGRAM

## 2025-05-19 PROCEDURE — 77080 DXA BONE DENSITY AXIAL: CPT | Mod: 26,,, | Performed by: RADIOLOGY

## 2025-05-22 ENCOUNTER — OFFICE VISIT (OUTPATIENT)
Dept: OPHTHALMOLOGY | Facility: CLINIC | Age: 75
End: 2025-05-22
Payer: MEDICARE

## 2025-05-22 DIAGNOSIS — Z98.42 STATUS POST CATARACT SURGERY, LEFT: Primary | ICD-10-CM

## 2025-05-22 PROCEDURE — 1160F RVW MEDS BY RX/DR IN RCRD: CPT | Mod: CPTII,S$GLB,, | Performed by: OPHTHALMOLOGY

## 2025-05-22 PROCEDURE — 1159F MED LIST DOCD IN RCRD: CPT | Mod: CPTII,S$GLB,, | Performed by: OPHTHALMOLOGY

## 2025-05-22 PROCEDURE — 3066F NEPHROPATHY DOC TX: CPT | Mod: CPTII,S$GLB,, | Performed by: OPHTHALMOLOGY

## 2025-05-22 PROCEDURE — 3044F HG A1C LEVEL LT 7.0%: CPT | Mod: CPTII,S$GLB,, | Performed by: OPHTHALMOLOGY

## 2025-05-22 PROCEDURE — 3061F NEG MICROALBUMINURIA REV: CPT | Mod: CPTII,S$GLB,, | Performed by: OPHTHALMOLOGY

## 2025-05-22 PROCEDURE — 1101F PT FALLS ASSESS-DOCD LE1/YR: CPT | Mod: CPTII,S$GLB,, | Performed by: OPHTHALMOLOGY

## 2025-05-22 PROCEDURE — 3288F FALL RISK ASSESSMENT DOCD: CPT | Mod: CPTII,S$GLB,, | Performed by: OPHTHALMOLOGY

## 2025-05-22 PROCEDURE — 99024 POSTOP FOLLOW-UP VISIT: CPT | Mod: S$GLB,,, | Performed by: OPHTHALMOLOGY

## 2025-05-22 PROCEDURE — 1126F AMNT PAIN NOTED NONE PRSNT: CPT | Mod: CPTII,S$GLB,, | Performed by: OPHTHALMOLOGY

## 2025-05-22 PROCEDURE — 99999 PR PBB SHADOW E&M-EST. PATIENT-LVL III: CPT | Mod: PBBFAC,,, | Performed by: OPHTHALMOLOGY

## 2025-05-22 NOTE — PROGRESS NOTES
HPI    1 week s/p phaco IOL OS done on 5/14/2025  Pt states will like to wait for OD surgery until after July. New date   given.     Pt states OS feels well. Denies pain/ FOL/ floaters.     Compliant with post op gtts. Using pred, moxi, and keto as directed.        Devon OU QAM   Latanoprost OU QPM   Last edited by Rosetta Nelson on 5/22/2025  8:39 AM.            Assessment /Plan     For exam results, see Encounter Report.    Status post cataract surgery, left      POW1 CEIOL  Doing well  D/c moxi  Continue PF QID with taper  Continue keto qdaily  Post op instructions reviewed    F/u 1 month, PW for OD    Pt wants to delay OD sx until July

## 2025-05-26 NOTE — PROGRESS NOTES
"Subjective:      Patient ID: Kyrie Norton Jr. is a 75 y.o. male.    Chief Complaint: Disease Management    HPI    Rheumatologic History:   - Diagnosis/es:              - seronegative rheumatoid arthritis  - Positive serologies: -  - Negative serologies: RF, CCP  - Infectious screening labs: positive quantiferon s/p prophylactic therapy (5/2025) negative hepatitis B and C (5/2025)  - Imaging:              - DEXA scan (5/2025) Normal BMD              - Xray arthritis survey (9/2022) no obvious erosive changes  - Previous Treatments:              - Prednisone              - HCQ 400mg daily: Ineffective  - Current Treatments:               - MTX 12.5mg weekly plus folic acid daily (dose decreased 7/26/24)  - Hyrimoz weekly (6/2023- ; frequency increased 1/27/2025)  - Plaquenil eye exam: No toxicity (4/2022)     Interval History:  He reports stiffness in the right hand but denies overt pain, swelling, and medication adverse effects.     Objective:   BP (!) 150/73   Pulse (!) 59   Ht 5' 7" (1.702 m)   Wt 75.2 kg (165 lb 12.6 oz)   BMI 25.97 kg/m²   Physical Exam   Constitutional: normal appearance.   HENT:   Head: Normocephalic and atraumatic.   Cardiovascular: Normal rate, regular rhythm and normal heart sounds.   Pulmonary/Chest: Effort normal and breath sounds normal.   Musculoskeletal:      Comments: + Heberden's and Letha's nodes  Unable to make a full fist with the right hand  No synovitis, dactylitis, enthesitis, effusions     Neurological: He is alert.   Skin: Skin is warm and dry. No rash noted.   No skin thickening, telangiectasias, calcinosis, psoriasiform lesions, lupoid lesions        6/5/2023 9/8/2023 1/26/2024   Tender (JIMENEZ-28) 4 / 28  0 / 28  0 / 28    Swollen (JIMENEZ-28) 0 / 28  0 / 28  0 / 28    Provider Global -- 20 / 100 10 / 100   Patient Global 50 / 100 20 / 100 10 / 100   ESR 5 mm/hr 0 mm/hr 2 mm/hr   CRP 1.3 mg/L -- 0.4 mg/L   JIMENEZ-28 (ESR) 2.95 (Low disease activity) -- 0.63 (Remission) "   JIMENEZ-28 (CRP) 3.08 (Low disease activity) -- 1.22 (Remission)   CDAI Score -- 4  2      Labs (5/19/25)  CBC HGB 13.5, WBC, PLT WNL   CR, AST, ALT WNL  ESR CRP WNL    Assessment:     1. Rheumatoid arthritis involving multiple sites with positive rheumatoid factor    2. Drug-induced immunodeficiency    3. High risk medication use    4. Primary osteoarthritis involving multiple joints      This is a 75-year-old man with past medical history of HLD, T2DM, positive quantiferon with borderline T-spot but no symptoms s/p prophylactic therapy, and seronegative rheumatoid arthritis on methotrexate 12.5 mg weekly, and Hyrimoz weekly. He reports stiffness in the right hand but denies overt pain, swelling, and medication adverse effects. He has difficulty making a full fist with the right hand but does not have synovitis on exam. Refer for OT. Trial of prednisone 5mg daily PRN.    Plan:     Problem List Items Addressed This Visit          Immunology/Multi System    Rheumatoid arthritis - Primary    Relevant Medications    predniSONE (DELTASONE) 5 MG tablet    Other Relevant Orders    CBC Auto Differential    Sedimentation rate    C-Reactive Protein    Creatinine, Serum    AST (SGOT)    ALT (SGPT)    Ambulatory Referral/Consult to Occupational Therapy    Drug-induced immunodeficiency    Relevant Orders    CBC Auto Differential    Sedimentation rate    C-Reactive Protein    Creatinine, Serum    AST (SGOT)    ALT (SGPT)       Orthopedic    Primary osteoarthritis involving multiple joints    Relevant Orders    Ambulatory Referral/Consult to Occupational Therapy       Palliative Care    High risk medication use    Relevant Orders    CBC Auto Differential    Sedimentation rate    C-Reactive Protein    Creatinine, Serum    AST (SGOT)    ALT (SGPT)     1.) Seronegative rheumatoid arthritis  2.) high risk medication use  3.) Drug induced immunodeficiency  - Humira weekly  - MTX 12.5mg weekly plus folic acid daily   - prednisone 5mg  daily PRN  - Robaxin PRN for cramps  - CBC, CMP, ESR, CRP every 12 weeks  - Pre-DMARD labs yearly  - Immunizations: Zoster live (2015), flu (1/22), PCV13 (2020), PCV20 (9/2022)  - OT    Follow up IN 6 MONTHS    40 minutes of total time spent on the encounter, which includes face to face time and non-face to face time preparing to see the patient (eg, review of tests), Obtaining and/or reviewing separately obtained history, Documenting clinical information in the electronic or other health record, Independently interpreting results (not separately reported) and communicating results to the patient/family/caregiver, or Care coordination (not separately reported).     This note was prepared with [employer] Direct voice recognition transcription software. Garbled syntax, mangled pronouns, and other bizarre constructions may be attributed to that software system       Nitza Ruiz M.D.  Rheumatology Dept  [place], [place]

## 2025-05-27 ENCOUNTER — OFFICE VISIT (OUTPATIENT)
Dept: RHEUMATOLOGY | Facility: CLINIC | Age: 75
End: 2025-05-27
Payer: MEDICARE

## 2025-05-27 VITALS
SYSTOLIC BLOOD PRESSURE: 150 MMHG | WEIGHT: 165.81 LBS | HEIGHT: 67 IN | BODY MASS INDEX: 26.02 KG/M2 | HEART RATE: 59 BPM | DIASTOLIC BLOOD PRESSURE: 73 MMHG

## 2025-05-27 DIAGNOSIS — D84.821 DRUG-INDUCED IMMUNODEFICIENCY: ICD-10-CM

## 2025-05-27 DIAGNOSIS — M15.0 PRIMARY OSTEOARTHRITIS INVOLVING MULTIPLE JOINTS: ICD-10-CM

## 2025-05-27 DIAGNOSIS — Z79.899 HIGH RISK MEDICATION USE: ICD-10-CM

## 2025-05-27 DIAGNOSIS — Z79.899 DRUG-INDUCED IMMUNODEFICIENCY: ICD-10-CM

## 2025-05-27 DIAGNOSIS — M05.79 RHEUMATOID ARTHRITIS INVOLVING MULTIPLE SITES WITH POSITIVE RHEUMATOID FACTOR: Primary | ICD-10-CM

## 2025-05-27 PROCEDURE — 1159F MED LIST DOCD IN RCRD: CPT | Mod: CPTII,S$GLB,, | Performed by: STUDENT IN AN ORGANIZED HEALTH CARE EDUCATION/TRAINING PROGRAM

## 2025-05-27 PROCEDURE — 3077F SYST BP >= 140 MM HG: CPT | Mod: CPTII,S$GLB,, | Performed by: STUDENT IN AN ORGANIZED HEALTH CARE EDUCATION/TRAINING PROGRAM

## 2025-05-27 PROCEDURE — 3288F FALL RISK ASSESSMENT DOCD: CPT | Mod: CPTII,S$GLB,, | Performed by: STUDENT IN AN ORGANIZED HEALTH CARE EDUCATION/TRAINING PROGRAM

## 2025-05-27 PROCEDURE — 1125F AMNT PAIN NOTED PAIN PRSNT: CPT | Mod: CPTII,S$GLB,, | Performed by: STUDENT IN AN ORGANIZED HEALTH CARE EDUCATION/TRAINING PROGRAM

## 2025-05-27 PROCEDURE — 3061F NEG MICROALBUMINURIA REV: CPT | Mod: CPTII,S$GLB,, | Performed by: STUDENT IN AN ORGANIZED HEALTH CARE EDUCATION/TRAINING PROGRAM

## 2025-05-27 PROCEDURE — 3066F NEPHROPATHY DOC TX: CPT | Mod: CPTII,S$GLB,, | Performed by: STUDENT IN AN ORGANIZED HEALTH CARE EDUCATION/TRAINING PROGRAM

## 2025-05-27 PROCEDURE — 3078F DIAST BP <80 MM HG: CPT | Mod: CPTII,S$GLB,, | Performed by: STUDENT IN AN ORGANIZED HEALTH CARE EDUCATION/TRAINING PROGRAM

## 2025-05-27 PROCEDURE — 99215 OFFICE O/P EST HI 40 MIN: CPT | Mod: S$GLB,,, | Performed by: STUDENT IN AN ORGANIZED HEALTH CARE EDUCATION/TRAINING PROGRAM

## 2025-05-27 PROCEDURE — 1101F PT FALLS ASSESS-DOCD LE1/YR: CPT | Mod: CPTII,S$GLB,, | Performed by: STUDENT IN AN ORGANIZED HEALTH CARE EDUCATION/TRAINING PROGRAM

## 2025-05-27 PROCEDURE — 1160F RVW MEDS BY RX/DR IN RCRD: CPT | Mod: CPTII,S$GLB,, | Performed by: STUDENT IN AN ORGANIZED HEALTH CARE EDUCATION/TRAINING PROGRAM

## 2025-05-27 PROCEDURE — 3044F HG A1C LEVEL LT 7.0%: CPT | Mod: CPTII,S$GLB,, | Performed by: STUDENT IN AN ORGANIZED HEALTH CARE EDUCATION/TRAINING PROGRAM

## 2025-05-27 PROCEDURE — 99999 PR PBB SHADOW E&M-EST. PATIENT-LVL IV: CPT | Mod: PBBFAC,,, | Performed by: STUDENT IN AN ORGANIZED HEALTH CARE EDUCATION/TRAINING PROGRAM

## 2025-05-27 PROCEDURE — 3008F BODY MASS INDEX DOCD: CPT | Mod: CPTII,S$GLB,, | Performed by: STUDENT IN AN ORGANIZED HEALTH CARE EDUCATION/TRAINING PROGRAM

## 2025-05-27 RX ORDER — PREDNISONE 5 MG/1
5 TABLET ORAL DAILY PRN
Qty: 90 TABLET | Refills: 1 | Status: SHIPPED | OUTPATIENT
Start: 2025-05-27

## 2025-05-27 ASSESSMENT — ROUTINE ASSESSMENT OF PATIENT INDEX DATA (RAPID3)
PATIENT GLOBAL ASSESSMENT SCORE: 2
PAIN SCORE: 1
TOTAL RAPID3 SCORE: 1
MDHAQ FUNCTION SCORE: 0
FATIGUE SCORE: 1.1
PSYCHOLOGICAL DISTRESS SCORE: 0

## 2025-05-30 ENCOUNTER — CLINICAL SUPPORT (OUTPATIENT)
Dept: REHABILITATION | Facility: HOSPITAL | Age: 75
End: 2025-05-30
Payer: MEDICARE

## 2025-05-30 DIAGNOSIS — M25.60 STIFFNESS IN JOINT: ICD-10-CM

## 2025-05-30 DIAGNOSIS — R53.1 WEAKNESS: Primary | ICD-10-CM

## 2025-05-30 DIAGNOSIS — M79.641 PAIN IN RIGHT HAND: ICD-10-CM

## 2025-05-30 PROCEDURE — 97165 OT EVAL LOW COMPLEX 30 MIN: CPT | Mod: PN

## 2025-05-30 PROCEDURE — 97530 THERAPEUTIC ACTIVITIES: CPT | Mod: PN

## 2025-05-30 NOTE — PATIENT INSTRUCTIONS
"[facility], OCCUPATIONAL THERAPY  HOME EXERCISE PROGRAM         AROM: DIP Flexion / Extension  Pinch middle knuckle to prevent bending.   Bend end knuckle until stretch is felt. Hold   3 seconds. Relax. Straighten finger as far as possible.      AROM: PIP Flexion / Extension  Pinch bottom knuckle  to prevent bending.   Actively bend middle knuckle until stretch is felt.   Hold 3 seconds. Relax. Straighten finger as far as possible.    AROM: Isolated MCP Flexion / Extension ("Wave")   Bend only your large, bottom knuckles. Hold 3 seconds.   Keep the tips of your fingers straight. Straighten fingers.      AROM: Isolated IPJ Flexion / Extension ("Hook")   Bend only your middle and end knuckles. Hold 3 seconds.   Straighten your fingers.       AROM: MCP and PIP Flexion / Extension ("Straight Fist")  Bend your bottom and middle knuckles, keeping the tips of your fingers straight.   Try to touch the pads of your fingers on your palm. Hold 3 seconds.   Straighten your fingers.       AROM: Composite Flexion / Extension ("Full Fist")  Bend every joint in your hand into a fist. Hold 3 seconds.   Straighten your fingers.         AROM: Composte Extension ("Finger Lifts")  Lift your finger off of the table one at a time. Hold 3 seconds.   Relax your finger.      AROM: Abduction / Adduction  With hand flat on table, spread all fingers apart,   then bring them together as close as possible.      AROM: Thumb IP Flexion / Extension  Brace thumb below tip joint. Bend joint as far as   possible then straighten.      AROM: Composite Flexion   Bend both joints of thumb as far as possible.   Try to touch base of little finger.      AROM: Radial Adduction / Abduction  Place your palm flat on the table. Move thumb out   to side. Move back alongside index finger.                                       AROM: Palmar Adduction / Abduction   Rest your small finger on the table. Move thumb   sideways, out and away from   palm. " "Move back to rest along palm.                        AROM: Opposition   Touch tip of thumb to nail tip of each  finger in turn, making an "O" shape.      AROM: Composite Movement Circumduction  Make clockwise circles with thumb. Reverse and make counterclockwise   circles   with thumb.                                    AROM: Composite Flesion ("Pinky Slides")  Touch thumb to tip of small finger. Slide thumb down   small finger into palm.         AROM: MP Extension   With palm on table, lift thumb up.   Hold 3 seconds. Relax and lower thumb.      Therapist: Era Reilly, OT       "

## 2025-05-30 NOTE — PROGRESS NOTES
Outpatient Rehab    Occupational Therapy Evaluation    Patient Name: Kyrie Norton Jr.  MRN: [id number]  YOB: 1950  Encounter Date: 5/30/2025    Therapy Diagnosis:   Encounter Diagnoses   Name Primary?    Weakness Yes    Stiffness in joint     Pain in right hand      Physician: Nitza Ruiz *    Physician Orders: Eval and Treat  Medical Diagnosis: Rheumatoid arthritis involving multiple sites with positive rheumatoid factor  Primary osteoarthritis involving multiple joints    Visit # / Visits Authorized: 1 / 1  Insurance Authorization Period: 5/27/2025 to 5/27/2026  Date of Evaluation: 5/30/2025  Plan of Care Certification: 5/30/2025 to 7/29/25     Time In: 0700   Time Out: 0745  Total Time (in minutes): 45   Total Billable Time (in minutes): 45         Subjective   History of Present Illness  Kyrie is a 75 y.o. male who reports to occupational therapy with a chief concern of pain, stiffness, weakness.     The patient reports a medical diagnosis of M05.79 (ICD-10-CM) - Rheumatoid arthritis involving multiple sites with positive rheumatoid factor  M15.0 (ICD-10-CM) - Primary osteoarthritis involving multiple joints. The patient has experienced this issue since 05/30/25.           Dominant Hand: Right  History of Present Condition/Illness: Pt states that he has RA. He states he was on a low dose steroid for a long time and he didn't have any problems. A few years ago, he stopped taking the steroids and he states that ever since his hand has progressively gotten stiffer and weaker. Pt is being referred to therapy for ROM and strength.     Activities of Daily Living  Social history was obtained from Patient.          Patient Responsibilities: Driving, Health management, Home management, Personal ADL, Yard work    Previously independent with activities of daily living? Yes     Currently independent with activities of daily living? Yes          Previously independent with instrumental activities of  daily living? Yes     Currently independent with instrumental activities of daily living? Yes              Pain  No Pain Reported: Yes       Clinical Progression (since onset): Stable         Treatment History  Treatments  Previously Received Treatments: No  Currently Receiving Treatments: No    Living Arrangements  Living Situation  Housing: Home independently  Living Arrangements: Spouse/significant other  Support Systems: Spouse/significant other        Employment  Patient does not report that: Does the patient's condition impact their ability to work?  Employment Status: Retired          Past Medical History/Physical Systems Review:   Kyrie Norton Jr.  has a past medical history of Diabetes mellitus type II, Hyperlipidemia, Hypertension, and Unspecified glaucoma.    Kyrie Norton Jr.  has a past surgical history that includes Fracture surgery and Cataract extraction w/  intraocular lens implant (Left, 5/14/2025).    Kyrie has a current medication list which includes the following prescription(s): adalimumab-adaz, aspirin, atorvastatin, blood sugar diagnostic, coenzyme q10, fish oil-omega-3 fatty acids, fluocinonide-emollient, folic acid, glipizide-metformin, ketorolac 0.5%, lancets, latanoprost, methocarbamol, methotrexate, milk thistle, moxifloxacin, mupirocin, prednisolone acetate, prednisone, and timolol maleate 0.5%.    Review of patient's allergies indicates:   Allergen Reactions    Parafon forte Nausea Only        Objective       Digit 1 - Thumb Active Range of Motion  Right Thumb   Flexion (deg) Extension (deg) Pain   CMC         MCP   20     IP   15       Right Thumb   Range (deg) Pain   Palmar ABduction 60     Radial ABduction 55     ADduction           Digit 2 - Index Finger Active Range of Motion  Right Index Finger   Extension (deg) Flexion (deg) Pain   MCP   45     PIP   75     DIP   40     NGUYEN:        Digit 3 - Middle Finger Active Range of Motion  Right Middle Finger   Extension (deg) Flexion  (deg) Pain   MCP   50     PIP   80     DIP   45     NGUYEN:        Digit 4 - Ring Finger Active Range of Motion  Right Ring Finger   Extension (deg) Flexion (deg) Pain   MCP   40     PIP   90     DIP   40     NGUYEN:        Digit 5 - Little Finger Active Range of Motion  Right Little Finger   Extension (deg) Flexion (deg) Pain   MCP   20     PIP   80     DIP   40     NGUYEN:        Pt has opposition to tip of SF with right hand                  Right  Strength  Right Hand Dynamometer Position: 2  Elbow Position Forearm Position Trial 1 (lbs) Trial 2  (lbs) Trial 3  (lbs) Average  (lbs) Pain   Flexed Neutral 30 30 30 30         Left  Strength  Left Hand Dynamometer Position: 2  Elbow Position Forearm Position Trial 1 (lbs) Trial 2 (lbs) Trial 3 (lbs) Average (lbs) Pain   Flexed Neutral 60 60 60 60         Right Pinch Strength   Trial 1 (lbs) Trial 2 (lbs) Trial 3 (lbs) Average (lbs) Pain   Lateral (Key Pinch) 19           Three [place] ([place])             Two Point (Tip to Tip)                 Left Pinch Strength   Trial 1 (lbs) Trial 2 (lbs) Trial 3 (lbs) Average (lbs) Pain   Lateral (Key Pinch) 21           Three [place] (Three [place])             Two Point (Tip to Tip)                           Treatment:  Therapeutic Activity  TA 1: demonstration and explanation of HEP  Manual Therapy  MT 1: PROM joint blocking of all hand joints  Modalities  Paraffin Bath: with MHP to decrease stiffness    Time Entry(in minutes):  OT Evaluation (Low) Time Entry: 30  Therapeutic Activity Time Entry: 15    Assessment & Plan   Assessment  Kyrie presents with a condition of Low complexity.   Presentation of Symptoms: Stable  Will Comorbidities Impact Care: No          Functional Limitations: Carrying objects, Fine motor coordination, Manipulating objects, Range of motion                 Evaluation/Treatment Response: Patient responded to treatment well  Patient Goal for Therapy (OT): Pt would like to be able to make a full  fist and increase  strength  Prognosis: Good    Plan  From an occupational therapy perspective, the patient would benefit from: Skilled Rehab Services    Planned therapy interventions include: Therapeutic exercise, Therapeutic activities, Neuromuscular re-education, Manual therapy, ADLs/IADLs, Orthotic management and training, Lymphatic compression wrapping, Prosthetic management and training, Work conditioning, Work hardening, and Wound care.    Planned modalities to include: Contrast bath, Cryotherapy (cold pack), Electrical stimulation - attended, Electrical stimulation - passive/unattended, Fluidotherapy, Paraffin bath, Thermotherapy (hot pack), Ultrasound, and Whirlpool.        Visit Frequency: 1 times Per Week for 6 Weeks.       This plan was discussed with Patient.   Discussion participants: Agreed Upon Plan of Care             The patient's spiritual, cultural, and educational needs were considered, and the patient is agreeable to the plan of care and goals.     Education  Education was done with Patient. The patient's learning style includes Demonstration. The patient Demonstrates understanding and Verbalizes understanding.         Pt was educated on HEP which can be found under patient instructions.        Goals:   Active       LTGs       Pt will increase ROM so that patient has a composite fist in order to participate in ADLs        Start:  05/30/25    Expected End:  07/29/25            Pt will increase  strength to 40 psi in order to grasp objects when completing ADLs       Start:  05/30/25    Expected End:  07/29/25               STGs        Pt will increase ROM  in index finger PIP to 65 degrees so that patient has a composite fist in order to participate in ADLs        Start:  05/30/25    Expected End:  06/29/25            Pt will increase  strength to 35 psi in order to grasp objects when completing ADLs       Start:  05/30/25    Expected End:  06/29/25            Initiate HEP        Start:  05/30/25                Era Reilly, OT

## 2025-06-04 ENCOUNTER — CLINICAL SUPPORT (OUTPATIENT)
Dept: REHABILITATION | Facility: HOSPITAL | Age: 75
End: 2025-06-04
Payer: MEDICARE

## 2025-06-04 DIAGNOSIS — M25.60 STIFFNESS IN JOINT: ICD-10-CM

## 2025-06-04 DIAGNOSIS — M79.641 PAIN IN RIGHT HAND: ICD-10-CM

## 2025-06-04 DIAGNOSIS — R53.1 WEAKNESS: Primary | ICD-10-CM

## 2025-06-04 PROCEDURE — 97035 APP MDLTY 1+ULTRASOUND EA 15: CPT | Mod: PN

## 2025-06-04 PROCEDURE — 97140 MANUAL THERAPY 1/> REGIONS: CPT | Mod: PN

## 2025-06-04 PROCEDURE — 97018 PARAFFIN BATH THERAPY: CPT | Mod: PN

## 2025-06-04 PROCEDURE — 97530 THERAPEUTIC ACTIVITIES: CPT | Mod: PN

## 2025-06-11 ENCOUNTER — CLINICAL SUPPORT (OUTPATIENT)
Dept: REHABILITATION | Facility: HOSPITAL | Age: 75
End: 2025-06-11
Payer: MEDICARE

## 2025-06-11 DIAGNOSIS — R53.1 WEAKNESS: Primary | ICD-10-CM

## 2025-06-11 DIAGNOSIS — M79.641 PAIN IN RIGHT HAND: ICD-10-CM

## 2025-06-11 DIAGNOSIS — M25.60 STIFFNESS IN JOINT: ICD-10-CM

## 2025-06-11 PROCEDURE — 97530 THERAPEUTIC ACTIVITIES: CPT | Mod: PN

## 2025-06-11 PROCEDURE — 97140 MANUAL THERAPY 1/> REGIONS: CPT | Mod: PN

## 2025-06-11 PROCEDURE — 97018 PARAFFIN BATH THERAPY: CPT | Mod: PN

## 2025-06-11 NOTE — PROGRESS NOTES
Outpatient Rehab    Occupational Therapy Visit    Patient Name: Kyrie Norton Jr.  MRN: [id number]  YOB: 1950  Encounter Date: 6/11/2025    Therapy Diagnosis:   Encounter Diagnoses   Name Primary?    Weakness Yes    Stiffness in joint     Pain in right hand      Physician: Nitza Ruiz *    Physician Orders: Eval and Treat  Medical Diagnosis: Rheumatoid arthritis involving multiple sites with positive rheumatoid factor  Primary osteoarthritis involving multiple joints  Surgical Diagnosis: Not applicable for this Episode   Surgical Date: Not applicable for this Episode    Visit # / Visits Authorized: 2 / 6  Insurance Authorization Period: 6/4/2025 to 7/11/2025  Date of Evaluation: 5/30/2025  Plan of Care Certification: 5/30/2025 to 7/29/2025      Time In: 0805   Time Out: 0845  Total Time (in minutes): 40   Total Billable Time (in minutes): 40      Subjective   Pt states that his hand hurts when he makes a full fist.  Pain reported as 3/10.      Objective        Progress note to be taken every 10th visit or every 30 days with updated objective information     Treatment:  Therapeutic Activity  TA 1: tan putty   TA 2: isospheres x 3 minutes  TA 3: pom pom  with yellow clothespin  TA 4: AROM of all joints in fingers  TA 5: tennis ball turns  TA 6: hook-fist exercise  TA 7: education on trigger finger  Manual Therapy  MT 1: retrograde massage to all digits  MT 2: IASTM to index finger at the A1 pulley    Time Entry(in minutes):  Paraffin Bath Time Entry: 10  Ultrasound Time Entry: 8  Manual Therapy Time Entry: 10  Therapeutic Activity Time Entry: 12    Assessment & Plan   Assessment: Pt tolerated added therapeutic activities well today without complaints of additional pain. In the middle of the session, patient's index finger triggered again and had to be released.  Ultrasound and IASTM were preformed to A1 pulley to decrease inflammation. Pt tolerated well. Pt is motivated. Will  progress as tolerated.  Evaluation/Treatment Tolerance: Patient tolerated treatment well    The patient will continue to benefit from skilled outpatient occupational therapy in order to address the deficits listed in the problem list on the initial evaluation, provide patient and family education, and maximize the patients level of independence in the home and community environments.     The patient's spiritual, cultural, and educational needs were considered, and the patient is agreeable to the plan of care and goals.           Plan: Continue with current plan of care    Goals:   Active       LTGs       Pt will increase ROM so that patient has a composite fist in order to participate in ADLs  (Progressing)       Start:  05/30/25    Expected End:  07/29/25            Pt will increase  strength to 40 psi in order to grasp objects when completing ADLs (Progressing)       Start:  05/30/25    Expected End:  07/29/25               STGs        Pt will increase ROM  in index finger PIP to 65 degrees so that patient has a composite fist in order to participate in ADLs  (Progressing)       Start:  05/30/25    Expected End:  06/29/25            Pt will increase  strength to 35 psi in order to grasp objects when completing ADLs (Progressing)       Start:  05/30/25    Expected End:  06/29/25            Initiate HEP (Progressing)       Start:  05/30/25                Era Reilly, OT

## 2025-06-30 ENCOUNTER — TELEPHONE (OUTPATIENT)
Dept: FAMILY MEDICINE | Facility: CLINIC | Age: 75
End: 2025-06-30
Payer: MEDICARE

## 2025-06-30 DIAGNOSIS — Z79.899 ENCOUNTER FOR LONG-TERM (CURRENT) USE OF MEDICATIONS: Primary | ICD-10-CM

## 2025-06-30 DIAGNOSIS — E11.69 HYPERLIPIDEMIA ASSOCIATED WITH TYPE 2 DIABETES MELLITUS: ICD-10-CM

## 2025-06-30 DIAGNOSIS — E78.5 HYPERLIPIDEMIA ASSOCIATED WITH TYPE 2 DIABETES MELLITUS: ICD-10-CM

## 2025-06-30 DIAGNOSIS — E11.9 TYPE 2 DIABETES MELLITUS WITHOUT RETINOPATHY: ICD-10-CM

## 2025-07-01 LAB
ALBUMIN SERPL-MCNC: 4.1 G/DL (ref 3.6–5.1)
ALBUMIN/GLOB SERPL: 1.8 (CALC) (ref 1–2.5)
ALP SERPL-CCNC: 44 U/L (ref 35–144)
ALT SERPL-CCNC: 18 U/L (ref 9–46)
AST SERPL-CCNC: 16 U/L (ref 10–35)
BILIRUB SERPL-MCNC: 1.2 MG/DL (ref 0.2–1.2)
BUN SERPL-MCNC: 16 MG/DL (ref 7–25)
BUN/CREAT SERPL: NORMAL (CALC) (ref 6–22)
CALCIUM SERPL-MCNC: 9.1 MG/DL (ref 8.6–10.3)
CHLORIDE SERPL-SCNC: 106 MMOL/L (ref 98–110)
CHOLEST SERPL-MCNC: 129 MG/DL
CHOLEST/HDLC SERPL: 2.8 (CALC)
CO2 SERPL-SCNC: 29 MMOL/L (ref 20–32)
CREAT SERPL-MCNC: 0.76 MG/DL (ref 0.7–1.28)
EGFR: 94 ML/MIN/1.73M2
GLOBULIN SER CALC-MCNC: 2.3 G/DL (CALC) (ref 1.9–3.7)
GLUCOSE SERPL-MCNC: 78 MG/DL (ref 65–99)
HBA1C MFR BLD: 6.6 %
HDLC SERPL-MCNC: 46 MG/DL
LDLC SERPL CALC-MCNC: 68 MG/DL (CALC)
NONHDLC SERPL-MCNC: 83 MG/DL (CALC)
POTASSIUM SERPL-SCNC: 4.6 MMOL/L (ref 3.5–5.3)
PROT SERPL-MCNC: 6.4 G/DL (ref 6.1–8.1)
SODIUM SERPL-SCNC: 140 MMOL/L (ref 135–146)
TRIGL SERPL-MCNC: 74 MG/DL

## 2025-07-10 ENCOUNTER — OFFICE VISIT (OUTPATIENT)
Dept: OPHTHALMOLOGY | Facility: CLINIC | Age: 75
End: 2025-07-10
Payer: MEDICARE

## 2025-07-10 DIAGNOSIS — Z98.42 STATUS POST CATARACT SURGERY, LEFT: Primary | ICD-10-CM

## 2025-07-10 PROCEDURE — 3061F NEG MICROALBUMINURIA REV: CPT | Mod: CPTII,S$GLB,, | Performed by: OPHTHALMOLOGY

## 2025-07-10 PROCEDURE — 3044F HG A1C LEVEL LT 7.0%: CPT | Mod: CPTII,S$GLB,, | Performed by: OPHTHALMOLOGY

## 2025-07-10 PROCEDURE — 3066F NEPHROPATHY DOC TX: CPT | Mod: CPTII,S$GLB,, | Performed by: OPHTHALMOLOGY

## 2025-07-10 PROCEDURE — 99999 PR PBB SHADOW E&M-EST. PATIENT-LVL III: CPT | Mod: PBBFAC,,, | Performed by: OPHTHALMOLOGY

## 2025-07-10 PROCEDURE — 99024 POSTOP FOLLOW-UP VISIT: CPT | Mod: S$GLB,,, | Performed by: OPHTHALMOLOGY

## 2025-07-10 PROCEDURE — 1159F MED LIST DOCD IN RCRD: CPT | Mod: CPTII,S$GLB,, | Performed by: OPHTHALMOLOGY

## 2025-07-10 PROCEDURE — 1160F RVW MEDS BY RX/DR IN RCRD: CPT | Mod: CPTII,S$GLB,, | Performed by: OPHTHALMOLOGY

## 2025-07-10 NOTE — PROGRESS NOTES
HPI    1m PO OS. Pt states OS is doing well. Finished all Post op gtts as   directed. States not doing the surgery for OD yet. Will call when ready.     Latanoprost OU QHS   Devon OU BID       Last edited by Rosetta Nelson on 7/10/2025 10:17 AM.            Assessment /Plan     For exam results, see Encounter Report.    Status post cataract surgery, left      POM1 CEIOL OS  Doing well  Pt declines Mrx    Continue glaucoma drops unchanged    F/u 3 months, IOP check  Consider schedule OD

## 2025-07-31 ENCOUNTER — OFFICE VISIT (OUTPATIENT)
Dept: FAMILY MEDICINE | Facility: CLINIC | Age: 75
End: 2025-07-31
Payer: MEDICARE

## 2025-07-31 VITALS
OXYGEN SATURATION: 98 % | HEIGHT: 67 IN | SYSTOLIC BLOOD PRESSURE: 122 MMHG | BODY MASS INDEX: 25.9 KG/M2 | WEIGHT: 165 LBS | DIASTOLIC BLOOD PRESSURE: 62 MMHG | HEART RATE: 60 BPM | RESPIRATION RATE: 18 BRPM

## 2025-07-31 DIAGNOSIS — E11.9 TYPE 2 DIABETES MELLITUS WITHOUT RETINOPATHY: ICD-10-CM

## 2025-07-31 DIAGNOSIS — L98.9 SKIN LESION OF BACK: Primary | ICD-10-CM

## 2025-07-31 DIAGNOSIS — E11.69 HYPERLIPIDEMIA ASSOCIATED WITH TYPE 2 DIABETES MELLITUS: ICD-10-CM

## 2025-07-31 DIAGNOSIS — E78.5 HYPERLIPIDEMIA ASSOCIATED WITH TYPE 2 DIABETES MELLITUS: ICD-10-CM

## 2025-07-31 PROCEDURE — 1126F AMNT PAIN NOTED NONE PRSNT: CPT | Mod: CPTII,S$GLB,, | Performed by: PHYSICIAN ASSISTANT

## 2025-07-31 PROCEDURE — 3066F NEPHROPATHY DOC TX: CPT | Mod: CPTII,S$GLB,, | Performed by: PHYSICIAN ASSISTANT

## 2025-07-31 PROCEDURE — 99214 OFFICE O/P EST MOD 30 MIN: CPT | Mod: S$GLB,,, | Performed by: PHYSICIAN ASSISTANT

## 2025-07-31 PROCEDURE — 3078F DIAST BP <80 MM HG: CPT | Mod: CPTII,S$GLB,, | Performed by: PHYSICIAN ASSISTANT

## 2025-07-31 PROCEDURE — 3288F FALL RISK ASSESSMENT DOCD: CPT | Mod: CPTII,S$GLB,, | Performed by: PHYSICIAN ASSISTANT

## 2025-07-31 PROCEDURE — 3074F SYST BP LT 130 MM HG: CPT | Mod: CPTII,S$GLB,, | Performed by: PHYSICIAN ASSISTANT

## 2025-07-31 PROCEDURE — 1159F MED LIST DOCD IN RCRD: CPT | Mod: CPTII,S$GLB,, | Performed by: PHYSICIAN ASSISTANT

## 2025-07-31 PROCEDURE — 3044F HG A1C LEVEL LT 7.0%: CPT | Mod: CPTII,S$GLB,, | Performed by: PHYSICIAN ASSISTANT

## 2025-07-31 PROCEDURE — 1101F PT FALLS ASSESS-DOCD LE1/YR: CPT | Mod: CPTII,S$GLB,, | Performed by: PHYSICIAN ASSISTANT

## 2025-07-31 PROCEDURE — 3008F BODY MASS INDEX DOCD: CPT | Mod: CPTII,S$GLB,, | Performed by: PHYSICIAN ASSISTANT

## 2025-07-31 PROCEDURE — 3061F NEG MICROALBUMINURIA REV: CPT | Mod: CPTII,S$GLB,, | Performed by: PHYSICIAN ASSISTANT

## 2025-07-31 NOTE — PROGRESS NOTES
SUBJECTIVE:    Patient ID: Kyrie Norton Jr. is a 75 y.o. male.    Chief Complaint: Follow-up (No bottles// no refills needed// pt states he have no complaints today //last A1C done 06/30=6.6)    History of Present Illness    CHIEF COMPLAINT:  Kyrie presents today with concerns about skin lesions on his back.    SKIN CONCERNS:  He reports a skin lesion on left posterior shoulder above shoulder blade present for several years. The lesion scabs over when scratched and does not fully heal. When irritated, it does not completely resolve. He denies pain but notes ongoing skin changes. He has not had prior dermatology evaluation. He reports significant history of sun exposure and acknowledges recent sunburn with skin peeling on back.    LABS:  A1C from 3 weeks ago was 6.6, consistent in the sixes for past three years. He denies any issues with blood sugar or low blood sugar readings. Cholesterol results were noted to be excellent.      ROS:  General: -fever, -chills, -fatigue, -weight gain, -weight loss  Eyes: -vision changes, -redness, -discharge  ENT: -ear pain, -nasal congestion, -sore throat  Cardiovascular: -chest pain, -palpitations, -lower extremity edema  Respiratory: -cough, -shortness of breath  Gastrointestinal: -abdominal pain, -nausea, -vomiting, -diarrhea, -constipation, -blood in stool  Genitourinary: -dysuria, -hematuria, -frequency  Musculoskeletal: -joint pain, -muscle pain  Skin: -rash, +lesion  Neurological: -headache, -dizziness, -numbness, -tingling  Psychiatric: -anxiety, -depression, -sleep difficulty         Telephone on 06/30/2025   Component Date Value Ref Range Status    Cholesterol 06/30/2025 129  <200 mg/dL Final    HDL 06/30/2025 46  > OR = 40 mg/dL Final    Triglycerides 06/30/2025 74  <150 mg/dL Final    LDL Cholesterol 06/30/2025 68  mg/dL (calc) Final    HDL/Cholesterol Ratio 06/30/2025 2.8  <5.0 (calc) Final    Non HDL Chol. (LDL+VLDL) 06/30/2025 83  <130 mg/dL (calc) Final     Hemoglobin A1C 06/30/2025 6.6 (H)  <5.7 % Final    Glucose 06/30/2025 78  65 - 99 mg/dL Final    BUN 06/30/2025 16  7 - 25 mg/dL Final    Creatinine 06/30/2025 0.76  0.70 - 1.28 mg/dL Final    eGFR 06/30/2025 94  > OR = 60 mL/min/1.73m2 Final    BUN/Creatinine Ratio 06/30/2025 SEE NOTE:  6 - 22 (calc) Final    Sodium 06/30/2025 140  135 - 146 mmol/L Final    Potassium 06/30/2025 4.6  3.5 - 5.3 mmol/L Final    Chloride 06/30/2025 106  98 - 110 mmol/L Final    CO2 06/30/2025 29  20 - 32 mmol/L Final    Calcium 06/30/2025 9.1  8.6 - 10.3 mg/dL Final    Total Protein 06/30/2025 6.4  6.1 - 8.1 g/dL Final    Albumin 06/30/2025 4.1  3.6 - 5.1 g/dL Final    Globulin, Total 06/30/2025 2.3  1.9 - 3.7 g/dL (calc) Final    Albumin/Globulin Ratio 06/30/2025 1.8  1.0 - 2.5 (calc) Final    Total Bilirubin 06/30/2025 1.2  0.2 - 1.2 mg/dL Final    Alkaline Phosphatase 06/30/2025 44  35 - 144 U/L Final    AST 06/30/2025 16  10 - 35 U/L Final    ALT 06/30/2025 18  9 - 46 U/L Final   Lab Visit on 05/19/2025   Component Date Value Ref Range Status    CRP 05/19/2025 <0.3  <=8.2 mg/L Final    Creatinine 05/19/2025 0.8  0.5 - 1.4 mg/dL Final    eGFR 05/19/2025 >60  >60 mL/min/1.73/m2 Final    ALT 05/19/2025 15  10 - 44 unit/L Final    AST 05/19/2025 18  11 - 45 unit/L Final    Sed Rate 05/19/2025 0  <=10 mm/hr Final    Hep BsAg Interp 05/19/2025 Non-Reactive  Non-Reactive Final    Hep BcAb Interp 05/19/2025 Non-Reactive  Non-Reactive Final    Hep BsAb Interp 05/19/2025 Non-Reactive    Final    Hep BsAb 05/19/2025 <3.00  mIU/mL Final    Hep C Ab Interp 05/19/2025 Non-Reactive  Non-Reactive Final    WBC 05/19/2025 7.23  3.90 - 12.70 K/uL Final    RBC 05/19/2025 4.34 (L)  4.60 - 6.20 M/uL Final    HGB 05/19/2025 13.5 (L)  14.0 - 18.0 gm/dL Final    HCT 05/19/2025 40.0  40.0 - 54.0 % Final    MCV 05/19/2025 92  82 - 98 fL Final    MCH 05/19/2025 31.1 (H)  27.0 - 31.0 pg Final    MCHC 05/19/2025 33.8  32.0 - 36.0 g/dL Final    RDW 05/19/2025  14.5  11.5 - 14.5 % Final    Platelet Count 05/19/2025 196  150 - 450 K/uL Final    MPV 05/19/2025 9.3  9.2 - 12.9 fL Final    Nucleated RBC 05/19/2025 0  <=0 /100 WBC Final    Neut % 05/19/2025 56.8  38 - 73 % Final    Lymph % 05/19/2025 27.0  18 - 48 % Final    Mono % 05/19/2025 9.8  4 - 15 % Final    Eos % 05/19/2025 5.7  <=8 % Final    Basophil % 05/19/2025 0.4  <=1.9 % Final    Imm Grans % 05/19/2025 0.3  0.0 - 0.5 % Final    Neut # 05/19/2025 4.11  1.8 - 7.7 K/uL Final    Lymph # 05/19/2025 1.95  1 - 4.8 K/uL Final    Mono # 05/19/2025 0.71  0.3 - 1 K/uL Final    Eos # 05/19/2025 0.41  <=0.5 K/uL Final    Baso # 05/19/2025 0.03  <=0.2 K/uL Final    Imm Grans # 05/19/2025 0.02  0.00 - 0.04 K/uL Final    QuantiFERON-TB Gold Plus 05/19/2025 Positive (A)  Negative Final    NIL TB 05/19/2025 0.82778   Final    TB1 Ag minus Nil 05/19/2025 0.19018   Final    TB2 Ag minus Nil 05/19/2025 0.08148   Final    Mitogen minus Nil 05/19/2025 9.91065   Final   Admission on 05/14/2025, Discharged on 05/14/2025   Component Date Value Ref Range Status    POCT Glucose 05/14/2025 113 (H)  70 - 110 mg/dL Final       Past Medical History:   Diagnosis Date    Diabetes mellitus type II     Hyperlipidemia     Hypertension     Unspecified glaucoma      Past Surgical History:   Procedure Laterality Date    CATARACT EXTRACTION W/  INTRAOCULAR LENS IMPLANT Left 5/14/2025    Procedure: CEIOL OS  Toric;  Surgeon: Allan Lockhart MD;  Location: [facility] OR;  Service: Ophthalmology;  Laterality: Left;    FRACTURE SURGERY      ankle     Family History   Problem Relation Name Age of Onset    Blindness Neg Hx      Glaucoma Neg Hx      Macular degeneration Neg Hx         Marital Status:   Alcohol History:  reports current alcohol use.  Tobacco History:  reports that he has never smoked. He has never used smokeless tobacco.  Drug History:  reports no history of drug use.    Review of patient's allergies indicates:   Allergen Reactions     "Parafon forte Nausea Only     Current Medications[1]    Objective:      Vitals:    07/31/25 0708   BP: 122/62   Pulse: 60   Resp: 18   SpO2: 98%   Weight: 74.8 kg (165 lb)   Height: 5' 7" (1.702 m)     Physical Exam    Vitals: Blood pressure: 122/62.  General: No acute distress. Well-developed. Well-nourished.  Eyes: EOMI. Sclerae anicteric.  HENT: Normocephalic. Atraumatic. Nares patent. Moist oral mucosa.  Ears: Bilateral TMs clear. Bilateral EACs clear.  Cardiovascular: Regular rate. Regular rhythm. No murmurs. No rubs. No gallops. Normal S1, S2.  Respiratory: Normal respiratory effort. Clear to auscultation bilaterally. No rales. No rhonchi. No wheezing.  Abdomen: Soft. Non-tender. Non-distended. Normoactive bowel sounds.  Musculoskeletal: No  obvious deformity.  Extremities: No lower extremity edema.  Neurological: Alert & oriented x3. No slurred speech. Normal gait.  Psychiatric: Normal mood. Normal affect. Good insight. Good judgment.  Skin: Warm. Dry. No rash. Irregular, crusting lesion on left posterior shoulder above shoulder blade. Multiple seborrheic keratoses on back. Multiple nevi with potential atypical appearance on back.         Assessment:       Assessment & Plan    - Identified suspicious lesion on left posterior shoulder, above shoulder blade.  - Lesion characteristics: irregular, crusty, irritated; present for several years.  - Concerned for potential non-melanoma skin cancer.  - Noted multiple nevi and seborrheic keratoses on back.  - Cholesterol WNL, A1C at 6.6 (stable in 6's for past 3 years).  - Kidney and liver function tests normal.  - BP well-controlled at 122/62.    ACTINIC KERATOSIS:  - Examined lesion on the left posterior shoulder which is irregular and crusty, possibly indicating actinic keratosis.  - Took clinical photographs of the suspicious skin lesion for documentation.  - Referred to dermatology for further evaluation, biopsy of the lesion, and full body skin check.  - Educated " the patient on importance of skin cancer screening and biopsy for suspicious lesions.  - Instructed the patient to contact the office if dermatology appointment is scheduled more than 4-6 weeks out for potential intervention to expedite appointment.    NEOPLASM OF UNCERTAIN BEHAVIOR OF SKIN:  - Observed multiple lesions on the skin, some atypical and concerning.  - The irregular and crusty lesion on the left posterior shoulder may also indicate a neoplasm of uncertain behavior, which will be evaluated during the dermatology referral.    SEBORRHEIC KERATOSIS:  - Observed multiple seborrheic keratosis on the skin during exam.  - Referred to dermatology for further evaluation and management of these lesions.    PREDIABETES:  - Monitored the patient's A1C which has been in the 6s for the past 3 years, currently at 6.6, indicating stable and controlled prediabetes.  - Advised the patient to continue current management plan.    SUNBURN:  - Observed the patient has a history of sun exposure and recent sunburn with peeling skin noted.    PREVENTIVE CARE:  - Discussed availability and benefits of both shingles and RSV vaccines.  - Recommend getting both vaccines from the pharmacy.    FOLLOW-UP:  - Scheduled follow up in 6 months and in January for annual visit with full lab work.  - Kyrie advised to contact the office if any urgent issues arise before next scheduled visit.       Plan:       Skin lesion of back  -     Ambulatory referral/consult to Dermatology; Future; Expected date: 07/31/2025    Hyperlipidemia associated with type 2 diabetes mellitus    Type 2 diabetes mellitus without retinopathy      Follow up in about 6 months (around 1/31/2026) for Diabetic Check-Up, Annual Physical.    This note was generated with the assistance of ambient listening technology. Verbal consent was obtained by the patient and accompanying visitor(s) for the recording of patient appointment to facilitate this note. I attest to having  reviewed and edited the generated note for accuracy, though some syntax or spelling errors may persist. Please contact the author of this note for any clarification.          7/31/2025 Marco Antonio Loyola PA-C           [1]   Current Outpatient Medications:     adalimumab-adaz (HYRIMOZ,CF, PEN) 40 mg/0.4 mL PnIj, Inject 0.4 mLs (40 mg total) into the skin every 7 days., Disp: 12 pen , Rfl: 3    aspirin (ECOTRIN) 81 MG EC tablet, Take 81 mg by mouth once daily., Disp: , Rfl:     atorvastatin (LIPITOR) 40 MG tablet, Take 1 tablet (40 mg total) by mouth once daily., Disp: 90 tablet, Rfl: 3    coenzyme Q10 10 mg capsule, Take 10 mg by mouth once daily., Disp: , Rfl:     fish oil-omega-3 fatty acids 300-1,000 mg capsule, Take 2 g by mouth once daily., Disp: , Rfl:     folic acid (FOLVITE) 1 MG tablet, Take 1 tablet (1,000 mcg total) by mouth once daily., Disp: 90 tablet, Rfl: 3    glipizide-metformin (METAGLIP) 5-500 mg per tablet, 1 tablet in the am, 2 tablets in the pm., Disp: 270 tablet, Rfl: 1    latanoprost 0.005 % ophthalmic solution, INSTILL 1 DROP INTO BOTH EYES IN THE EVENING, Disp: 7.5 mL, Rfl: 4    methocarbamoL (ROBAXIN) 750 MG Tab, Take 1 tablet (750 mg total) by mouth 3 (three) times daily as needed (Muscle cramps)., Disp: 90 tablet, Rfl: 1    methotrexate 2.5 MG Tab, TAKE 5 TABLETS (12.5 MG TOTAL) BY MOUTH EVERY 7 DAYS., Disp: 60 tablet, Rfl: 4    MILK THISTLE ORAL, Take 250 mg by mouth once daily. , Disp: , Rfl:     mupirocin (BACTROBAN) 2 % ointment, Apply topically 2 (two) times daily., Disp: , Rfl:     predniSONE (DELTASONE) 5 MG tablet, Take 1 tablet (5 mg total) by mouth daily as needed (joint pain)., Disp: 90 tablet, Rfl: 1    timolol maleate 0.5% (TIMOPTIC) 0.5 % Drop, PLACE 1 DROP INTO BOTH EYES ONCE DAILY, Disp: 10 mL, Rfl: 1    blood sugar diagnostic (ACCU-CHEK CJ PLUS TEST STRP) Strp, 1 strip by Subdermal route Daily., Disp: 100 strip, Rfl: 4    fluocinonide-emollient (FLUOCINONIDE-E) 0.05 %  Crea, Apply topically 2 (two) times daily. for 7 days (Patient not taking: Reported on 7/31/2025), Disp: 60 g, Rfl: 0    lancets (ACCU-CHEK FASTCLIX) Misc, 1 Device by Misc.(Non-Drug; Combo Route) route Daily., Disp: 100 each, Rfl: 4

## 2025-08-25 ENCOUNTER — LAB VISIT (OUTPATIENT)
Dept: LAB | Facility: HOSPITAL | Age: 75
End: 2025-08-25
Attending: STUDENT IN AN ORGANIZED HEALTH CARE EDUCATION/TRAINING PROGRAM
Payer: MEDICARE

## 2025-08-25 DIAGNOSIS — D84.821 DRUG-INDUCED IMMUNODEFICIENCY: ICD-10-CM

## 2025-08-25 DIAGNOSIS — M05.79 RHEUMATOID ARTHRITIS INVOLVING MULTIPLE SITES WITH POSITIVE RHEUMATOID FACTOR: ICD-10-CM

## 2025-08-25 DIAGNOSIS — Z79.899 DRUG-INDUCED IMMUNODEFICIENCY: ICD-10-CM

## 2025-08-25 DIAGNOSIS — Z79.899 HIGH RISK MEDICATION USE: ICD-10-CM

## 2025-08-25 LAB
ABSOLUTE EOSINOPHIL (OHS): 0.17 K/UL
ABSOLUTE MONOCYTE (OHS): 0.51 K/UL (ref 0.3–1)
ABSOLUTE NEUTROPHIL COUNT (OHS): 2.17 K/UL (ref 1.8–7.7)
ALT SERPL W/O P-5'-P-CCNC: 28 UNIT/L (ref 0–55)
AST SERPL-CCNC: 31 UNIT/L (ref 0–50)
BASOPHILS # BLD AUTO: 0.01 K/UL
BASOPHILS NFR BLD AUTO: 0.2 %
CREAT SERPL-MCNC: 0.8 MG/DL (ref 0.5–1.4)
CRP SERPL-MCNC: <0.3 MG/L
ERYTHROCYTE [DISTWIDTH] IN BLOOD BY AUTOMATED COUNT: 14.6 % (ref 11.5–14.5)
ERYTHROCYTE [SEDIMENTATION RATE] IN BLOOD: 0 MM/HR
GFR SERPLBLD CREATININE-BSD FMLA CKD-EPI: >60 ML/MIN/1.73/M2
HCT VFR BLD AUTO: 41.2 % (ref 40–54)
HGB BLD-MCNC: 13.5 GM/DL (ref 14–18)
IMM GRANULOCYTES # BLD AUTO: 0.01 K/UL (ref 0–0.04)
IMM GRANULOCYTES NFR BLD AUTO: 0.2 % (ref 0–0.5)
LYMPHOCYTES # BLD AUTO: 1.54 K/UL (ref 1–4.8)
MCH RBC QN AUTO: 30.8 PG (ref 27–31)
MCHC RBC AUTO-ENTMCNC: 32.8 G/DL (ref 32–36)
MCV RBC AUTO: 94 FL (ref 82–98)
NUCLEATED RBC (/100WBC) (OHS): 0 /100 WBC
PLATELET # BLD AUTO: 185 K/UL (ref 150–450)
PMV BLD AUTO: 10.1 FL (ref 9.2–12.9)
RBC # BLD AUTO: 4.38 M/UL (ref 4.6–6.2)
RELATIVE EOSINOPHIL (OHS): 3.9 %
RELATIVE LYMPHOCYTE (OHS): 34.9 % (ref 18–48)
RELATIVE MONOCYTE (OHS): 11.6 % (ref 4–15)
RELATIVE NEUTROPHIL (OHS): 49.2 % (ref 38–73)
WBC # BLD AUTO: 4.41 K/UL (ref 3.9–12.7)

## 2025-08-25 PROCEDURE — 85025 COMPLETE CBC W/AUTO DIFF WBC: CPT

## 2025-08-25 PROCEDURE — 82565 ASSAY OF CREATININE: CPT

## 2025-08-25 PROCEDURE — 86140 C-REACTIVE PROTEIN: CPT

## 2025-08-25 PROCEDURE — 84450 TRANSFERASE (AST) (SGOT): CPT

## 2025-08-25 PROCEDURE — 36415 COLL VENOUS BLD VENIPUNCTURE: CPT | Mod: PO

## 2025-08-25 PROCEDURE — 84460 ALANINE AMINO (ALT) (SGPT): CPT

## 2025-08-25 PROCEDURE — 85651 RBC SED RATE NONAUTOMATED: CPT | Mod: PO

## 2025-09-05 ENCOUNTER — OFFICE VISIT (OUTPATIENT)
Dept: OPTOMETRY | Facility: CLINIC | Age: 75
End: 2025-09-05
Payer: MEDICARE

## 2025-09-05 ENCOUNTER — CLINICAL SUPPORT (OUTPATIENT)
Dept: OPHTHALMOLOGY | Facility: CLINIC | Age: 75
End: 2025-09-05
Payer: MEDICARE

## 2025-09-05 DIAGNOSIS — Z96.1 PSEUDOPHAKIA: ICD-10-CM

## 2025-09-05 DIAGNOSIS — H25.11 NUCLEAR SCLEROSIS, RIGHT: ICD-10-CM

## 2025-09-05 DIAGNOSIS — H40.1122 PRIMARY OPEN ANGLE GLAUCOMA (POAG) OF LEFT EYE, MODERATE STAGE: ICD-10-CM

## 2025-09-05 DIAGNOSIS — H40.1111 PRIMARY OPEN ANGLE GLAUCOMA (POAG) OF RIGHT EYE, MILD STAGE: Primary | ICD-10-CM

## 2025-09-05 DIAGNOSIS — H53.031 STRABISMIC AMBLYOPIA OF RIGHT EYE: ICD-10-CM

## 2025-09-05 PROCEDURE — 99999 PR PBB SHADOW E&M-EST. PATIENT-LVL III: CPT | Mod: PBBFAC,,, | Performed by: OPTOMETRIST

## 2025-09-05 RX ORDER — TIMOLOL MALEATE 5 MG/ML
1 SOLUTION/ DROPS OPHTHALMIC DAILY
Qty: 10 ML | Refills: 1 | Status: SHIPPED | OUTPATIENT
Start: 2025-09-05 | End: 2026-09-05

## (undated) DEVICE — CYSTOTOME IRRIG 24G 13MM

## (undated) DEVICE — KIT POST CATARACT SURGERY

## (undated) DEVICE — PACK CUSTOM EYE KIT

## (undated) DEVICE — GLOVE SENSICARE PI ALOE 7.5

## (undated) DEVICE — SOL BETADINE 5%

## (undated) DEVICE — TAPE SILICONE 1 X1 1/2